# Patient Record
Sex: MALE | Race: WHITE | NOT HISPANIC OR LATINO | Employment: FULL TIME | URBAN - METROPOLITAN AREA
[De-identification: names, ages, dates, MRNs, and addresses within clinical notes are randomized per-mention and may not be internally consistent; named-entity substitution may affect disease eponyms.]

---

## 2018-01-22 ENCOUNTER — CONVERSION ENCOUNTER (OUTPATIENT)
Dept: PODIATRY | Facility: CLINIC | Age: 38
End: 2018-01-22

## 2018-01-22 ENCOUNTER — OFFICE VISIT CONVERTED (OUTPATIENT)
Dept: PODIATRY | Facility: CLINIC | Age: 38
End: 2018-01-22
Attending: PODIATRIST

## 2018-07-19 ENCOUNTER — OFFICE VISIT CONVERTED (OUTPATIENT)
Dept: FAMILY MEDICINE CLINIC | Facility: CLINIC | Age: 38
End: 2018-07-19
Attending: NURSE PRACTITIONER

## 2018-08-20 ENCOUNTER — OFFICE VISIT CONVERTED (OUTPATIENT)
Dept: FAMILY MEDICINE CLINIC | Facility: CLINIC | Age: 38
End: 2018-08-20
Attending: NURSE PRACTITIONER

## 2018-08-24 ENCOUNTER — CONVERSION ENCOUNTER (OUTPATIENT)
Dept: FAMILY MEDICINE CLINIC | Facility: CLINIC | Age: 38
End: 2018-08-24

## 2018-08-24 ENCOUNTER — OFFICE VISIT CONVERTED (OUTPATIENT)
Dept: FAMILY MEDICINE CLINIC | Facility: CLINIC | Age: 38
End: 2018-08-24
Attending: NURSE PRACTITIONER

## 2018-10-03 ENCOUNTER — OFFICE VISIT CONVERTED (OUTPATIENT)
Dept: FAMILY MEDICINE CLINIC | Facility: CLINIC | Age: 38
End: 2018-10-03
Attending: NURSE PRACTITIONER

## 2018-10-03 ENCOUNTER — CONVERSION ENCOUNTER (OUTPATIENT)
Dept: FAMILY MEDICINE CLINIC | Facility: CLINIC | Age: 38
End: 2018-10-03

## 2018-10-12 ENCOUNTER — OFFICE VISIT CONVERTED (OUTPATIENT)
Dept: FAMILY MEDICINE CLINIC | Facility: CLINIC | Age: 38
End: 2018-10-12
Attending: NURSE PRACTITIONER

## 2018-10-29 ENCOUNTER — OFFICE VISIT CONVERTED (OUTPATIENT)
Dept: FAMILY MEDICINE CLINIC | Facility: CLINIC | Age: 38
End: 2018-10-29
Attending: NURSE PRACTITIONER

## 2018-11-19 ENCOUNTER — OFFICE VISIT CONVERTED (OUTPATIENT)
Dept: FAMILY MEDICINE CLINIC | Facility: CLINIC | Age: 38
End: 2018-11-19
Attending: NURSE PRACTITIONER

## 2018-11-29 ENCOUNTER — OFFICE VISIT CONVERTED (OUTPATIENT)
Dept: FAMILY MEDICINE CLINIC | Facility: CLINIC | Age: 38
End: 2018-11-29
Attending: NURSE PRACTITIONER

## 2019-01-08 ENCOUNTER — HOSPITAL ENCOUNTER (OUTPATIENT)
Dept: URGENT CARE | Facility: CLINIC | Age: 39
Discharge: HOME | End: 2019-01-08
Attending: PHYSICIAN ASSISTANT

## 2019-01-08 LAB — GLUCOSE BLD-MCNC: 102 MG/DL (ref 70–99)

## 2019-01-14 ENCOUNTER — OFFICE VISIT CONVERTED (OUTPATIENT)
Dept: FAMILY MEDICINE CLINIC | Facility: CLINIC | Age: 39
End: 2019-01-14
Attending: NURSE PRACTITIONER

## 2019-01-29 ENCOUNTER — HOSPITAL ENCOUNTER (OUTPATIENT)
Dept: OTHER | Facility: HOSPITAL | Age: 39
Discharge: HOME | End: 2019-01-29

## 2019-01-29 LAB
ANION GAP SERPL CALC-SCNC: 15 MMOL/L (ref 8–19)
BUN SERPL-MCNC: 18 MG/DL (ref 5–25)
BUN/CREAT SERPL: 17 {RATIO} (ref 6–20)
CALCIUM SERPL-MCNC: 8.9 MG/DL (ref 8.7–10.4)
CHLORIDE SERPL-SCNC: 104 MMOL/L (ref 99–111)
CONV CO2: 28 MMOL/L (ref 22–32)
CREAT UR-MCNC: 1.03 MG/DL (ref 0.7–1.2)
EST. AVERAGE GLUCOSE BLD GHB EST-MCNC: 174 MG/DL
GFR SERPLBLD BASED ON 1.73 SQ M-ARVRAT: >60 ML/MIN/{1.73_M2}
GLUCOSE SERPL-MCNC: 233 MG/DL (ref 70–99)
HBA1C MFR BLD: 7.7 % (ref 3.5–5.7)
OSMOLALITY SERPL CALC.SUM OF ELEC: 305 MOSM/KG (ref 273–304)
POTASSIUM SERPL-SCNC: 4.2 MMOL/L (ref 3.5–5.3)
SODIUM SERPL-SCNC: 143 MMOL/L (ref 135–147)
T4 FREE SERPL-MCNC: 0.9 NG/DL (ref 0.9–1.8)
TSH SERPL-ACNC: 7.64 M[IU]/L (ref 0.27–4.2)

## 2019-04-30 ENCOUNTER — OFFICE VISIT CONVERTED (OUTPATIENT)
Dept: FAMILY MEDICINE CLINIC | Facility: CLINIC | Age: 39
End: 2019-04-30
Attending: NURSE PRACTITIONER

## 2019-06-24 ENCOUNTER — OFFICE VISIT CONVERTED (OUTPATIENT)
Dept: FAMILY MEDICINE CLINIC | Facility: CLINIC | Age: 39
End: 2019-06-24
Attending: NURSE PRACTITIONER

## 2019-06-24 ENCOUNTER — CONVERSION ENCOUNTER (OUTPATIENT)
Dept: FAMILY MEDICINE CLINIC | Facility: CLINIC | Age: 39
End: 2019-06-24

## 2019-08-07 ENCOUNTER — HOSPITAL ENCOUNTER (OUTPATIENT)
Dept: URGENT CARE | Facility: CLINIC | Age: 39
Discharge: HOME | End: 2019-08-07

## 2019-08-26 ENCOUNTER — HOSPITAL ENCOUNTER (OUTPATIENT)
Dept: OTHER | Facility: HOSPITAL | Age: 39
Discharge: HOME | End: 2019-08-26

## 2019-08-26 LAB
ANION GAP SERPL CALC-SCNC: 19 MMOL/L (ref 8–19)
BUN SERPL-MCNC: 12 MG/DL (ref 5–25)
BUN/CREAT SERPL: 15 {RATIO} (ref 6–20)
CALCIUM SERPL-MCNC: 8.6 MG/DL (ref 8.7–10.4)
CHLORIDE SERPL-SCNC: 104 MMOL/L (ref 99–111)
CONV CO2: 20 MMOL/L (ref 22–32)
CREAT UR-MCNC: 0.78 MG/DL (ref 0.7–1.2)
EST. AVERAGE GLUCOSE BLD GHB EST-MCNC: 160 MG/DL
GFR SERPLBLD BASED ON 1.73 SQ M-ARVRAT: >60 ML/MIN/{1.73_M2}
GLUCOSE SERPL-MCNC: 105 MG/DL (ref 70–99)
HBA1C MFR BLD: 7.2 % (ref 3.5–5.7)
OSMOLALITY SERPL CALC.SUM OF ELEC: 288 MOSM/KG (ref 273–304)
POTASSIUM SERPL-SCNC: 4.1 MMOL/L (ref 3.5–5.3)
SODIUM SERPL-SCNC: 139 MMOL/L (ref 135–147)
T4 FREE SERPL-MCNC: 0.7 NG/DL (ref 0.9–1.8)
TSH SERPL-ACNC: 5.88 M[IU]/L (ref 0.27–4.2)

## 2019-08-27 LAB — CONV ANTI MICROSOMAL AB: 22 IU/ML (ref 0–34)

## 2019-09-18 ENCOUNTER — HOSPITAL ENCOUNTER (OUTPATIENT)
Dept: URGENT CARE | Facility: CLINIC | Age: 39
Discharge: HOME | End: 2019-09-18

## 2019-09-24 ENCOUNTER — HOSPITAL ENCOUNTER (OUTPATIENT)
Dept: OTHER | Facility: HOSPITAL | Age: 39
Discharge: HOME | End: 2019-09-24

## 2019-09-24 LAB
T4 FREE SERPL-MCNC: 1 NG/DL (ref 0.9–1.8)
TSH SERPL-ACNC: 1.92 M[IU]/L (ref 0.27–4.2)

## 2019-11-26 ENCOUNTER — HOSPITAL ENCOUNTER (OUTPATIENT)
Dept: URGENT CARE | Facility: CLINIC | Age: 39
Discharge: HOME | End: 2019-11-26
Attending: PHYSICIAN ASSISTANT

## 2019-11-28 LAB — BACTERIA SPEC AEROBE CULT: NORMAL

## 2019-12-03 ENCOUNTER — HOSPITAL ENCOUNTER (OUTPATIENT)
Dept: OTHER | Facility: HOSPITAL | Age: 39
Discharge: HOME | End: 2019-12-03

## 2019-12-03 LAB
ALBUMIN SERPL-MCNC: 4.1 G/DL (ref 3.5–5)
ALBUMIN/GLOB SERPL: 1.2 {RATIO} (ref 1.4–2.6)
ALP SERPL-CCNC: 110 U/L (ref 53–128)
ALT SERPL-CCNC: 29 U/L (ref 10–40)
ANION GAP SERPL CALC-SCNC: 17 MMOL/L (ref 8–19)
AST SERPL-CCNC: 21 U/L (ref 15–50)
BILIRUB SERPL-MCNC: 0.5 MG/DL (ref 0.2–1.3)
BUN SERPL-MCNC: 11 MG/DL (ref 5–25)
BUN/CREAT SERPL: 11 {RATIO} (ref 6–20)
CALCIUM SERPL-MCNC: 9.6 MG/DL (ref 8.7–10.4)
CHLORIDE SERPL-SCNC: 100 MMOL/L (ref 99–111)
CONV CO2: 25 MMOL/L (ref 22–32)
CONV TOTAL PROTEIN: 7.4 G/DL (ref 6.3–8.2)
CREAT UR-MCNC: 1 MG/DL (ref 0.7–1.2)
EST. AVERAGE GLUCOSE BLD GHB EST-MCNC: 174 MG/DL
GFR SERPLBLD BASED ON 1.73 SQ M-ARVRAT: >60 ML/MIN/{1.73_M2}
GLOBULIN UR ELPH-MCNC: 3.3 G/DL (ref 2–3.5)
GLUCOSE SERPL-MCNC: 132 MG/DL (ref 70–99)
HBA1C MFR BLD: 7.7 % (ref 3.5–5.7)
OSMOLALITY SERPL CALC.SUM OF ELEC: 287 MOSM/KG (ref 273–304)
POTASSIUM SERPL-SCNC: 4.4 MMOL/L (ref 3.5–5.3)
SODIUM SERPL-SCNC: 138 MMOL/L (ref 135–147)
T4 FREE SERPL-MCNC: 0.9 NG/DL (ref 0.9–1.8)
TSH SERPL-ACNC: 3.25 M[IU]/L (ref 0.27–4.2)

## 2020-02-25 ENCOUNTER — OFFICE VISIT CONVERTED (OUTPATIENT)
Dept: FAMILY MEDICINE CLINIC | Facility: CLINIC | Age: 40
End: 2020-02-25
Attending: NURSE PRACTITIONER

## 2020-02-25 ENCOUNTER — CONVERSION ENCOUNTER (OUTPATIENT)
Dept: FAMILY MEDICINE CLINIC | Facility: CLINIC | Age: 40
End: 2020-02-25

## 2020-03-05 ENCOUNTER — HOSPITAL ENCOUNTER (OUTPATIENT)
Dept: DIABETES SERVICES | Facility: HOSPITAL | Age: 40
Discharge: HOME | End: 2020-03-05
Attending: NURSE PRACTITIONER

## 2020-03-05 ENCOUNTER — OFFICE VISIT CONVERTED (OUTPATIENT)
Dept: DIABETES SERVICES | Facility: HOSPITAL | Age: 40
End: 2020-03-05
Attending: NURSE PRACTITIONER

## 2020-05-18 ENCOUNTER — HOSPITAL ENCOUNTER (OUTPATIENT)
Dept: GENERAL RADIOLOGY | Facility: HOSPITAL | Age: 40
Discharge: HOME | End: 2020-05-18
Attending: NURSE PRACTITIONER

## 2020-05-18 ENCOUNTER — OFFICE VISIT CONVERTED (OUTPATIENT)
Dept: FAMILY MEDICINE CLINIC | Facility: CLINIC | Age: 40
End: 2020-05-18
Attending: NURSE PRACTITIONER

## 2020-05-18 ENCOUNTER — CONVERSION ENCOUNTER (OUTPATIENT)
Dept: FAMILY MEDICINE CLINIC | Facility: CLINIC | Age: 40
End: 2020-05-18

## 2020-06-04 ENCOUNTER — HOSPITAL ENCOUNTER (OUTPATIENT)
Dept: DIABETES SERVICES | Facility: HOSPITAL | Age: 40
Discharge: HOME | End: 2020-06-04
Attending: NURSE PRACTITIONER

## 2020-06-04 ENCOUNTER — OFFICE VISIT CONVERTED (OUTPATIENT)
Dept: DIABETES SERVICES | Facility: HOSPITAL | Age: 40
End: 2020-06-04
Attending: NURSE PRACTITIONER

## 2020-07-16 ENCOUNTER — CONVERSION ENCOUNTER (OUTPATIENT)
Dept: FAMILY MEDICINE CLINIC | Facility: CLINIC | Age: 40
End: 2020-07-16

## 2020-07-16 ENCOUNTER — OFFICE VISIT CONVERTED (OUTPATIENT)
Dept: FAMILY MEDICINE CLINIC | Facility: CLINIC | Age: 40
End: 2020-07-16
Attending: NURSE PRACTITIONER

## 2020-08-30 ENCOUNTER — HOSPITAL ENCOUNTER (OUTPATIENT)
Dept: URGENT CARE | Facility: CLINIC | Age: 40
Discharge: HOME | End: 2020-08-30

## 2020-09-01 ENCOUNTER — HOSPITAL ENCOUNTER (OUTPATIENT)
Dept: LAB | Facility: HOSPITAL | Age: 40
Discharge: HOME | End: 2020-09-01
Attending: NURSE PRACTITIONER

## 2020-09-01 LAB
EST. AVERAGE GLUCOSE BLD GHB EST-MCNC: 154 MG/DL
HBA1C MFR BLD: 7 % (ref 3.5–5.7)

## 2020-09-03 ENCOUNTER — OFFICE VISIT CONVERTED (OUTPATIENT)
Dept: DIABETES SERVICES | Facility: HOSPITAL | Age: 40
End: 2020-09-03
Attending: NURSE PRACTITIONER

## 2020-09-03 ENCOUNTER — HOSPITAL ENCOUNTER (OUTPATIENT)
Dept: DIABETES SERVICES | Facility: HOSPITAL | Age: 40
Discharge: HOME | End: 2020-09-03
Attending: NURSE PRACTITIONER

## 2020-09-22 ENCOUNTER — OFFICE VISIT CONVERTED (OUTPATIENT)
Dept: PODIATRY | Facility: CLINIC | Age: 40
End: 2020-09-22
Attending: PODIATRIST

## 2020-12-01 ENCOUNTER — HOSPITAL ENCOUNTER (OUTPATIENT)
Dept: LAB | Facility: HOSPITAL | Age: 40
Discharge: HOME | End: 2020-12-01
Attending: NURSE PRACTITIONER

## 2020-12-01 LAB
EST. AVERAGE GLUCOSE BLD GHB EST-MCNC: 134 MG/DL
HBA1C MFR BLD: 6.3 % (ref 3.5–5.7)

## 2020-12-02 ENCOUNTER — OFFICE VISIT CONVERTED (OUTPATIENT)
Dept: DIABETES SERVICES | Facility: HOSPITAL | Age: 40
End: 2020-12-02
Attending: NURSE PRACTITIONER

## 2021-01-04 ENCOUNTER — HOSPITAL ENCOUNTER (OUTPATIENT)
Dept: URGENT CARE | Facility: CLINIC | Age: 41
Discharge: HOME | End: 2021-01-04
Attending: NURSE PRACTITIONER

## 2021-01-18 ENCOUNTER — OFFICE VISIT CONVERTED (OUTPATIENT)
Dept: FAMILY MEDICINE CLINIC | Facility: CLINIC | Age: 41
End: 2021-01-18
Attending: NURSE PRACTITIONER

## 2021-01-21 ENCOUNTER — OFFICE VISIT CONVERTED (OUTPATIENT)
Dept: PODIATRY | Facility: CLINIC | Age: 41
End: 2021-01-21
Attending: PODIATRIST

## 2021-01-26 ENCOUNTER — OFFICE VISIT CONVERTED (OUTPATIENT)
Dept: PODIATRY | Facility: CLINIC | Age: 41
End: 2021-01-26
Attending: PODIATRIST

## 2021-02-03 ENCOUNTER — HOSPITAL ENCOUNTER (OUTPATIENT)
Dept: OTHER | Facility: HOSPITAL | Age: 41
Setting detail: RECURRING SERIES
Discharge: HOME | End: 2021-03-16
Attending: NURSE PRACTITIONER

## 2021-03-05 ENCOUNTER — HOSPITAL ENCOUNTER (OUTPATIENT)
Dept: DIABETES SERVICES | Facility: HOSPITAL | Age: 41
Discharge: HOME | End: 2021-03-05
Attending: NURSE PRACTITIONER

## 2021-03-05 ENCOUNTER — OFFICE VISIT CONVERTED (OUTPATIENT)
Dept: DIABETES SERVICES | Facility: HOSPITAL | Age: 41
End: 2021-03-05
Attending: NURSE PRACTITIONER

## 2021-03-05 LAB — GLUCOSE BLD-MCNC: 234 MG/DL (ref 70–99)

## 2021-05-10 NOTE — H&P
History and Physical      Patient Name: George Hunter   Patient ID: [id number]   Sex: Male   YOB: 1980    Primary Care Provider: Indu العراقي   Referring Provider: Indu العراقي    Visit Date: January 21, 2021    Provider: Jayy Buitrago DPM   Location: [facility]   Location Address: [address] [place] [address]  [id number]   Location Phone: [phone]          Chief Complaint  · Right Foot Pain  · Ingrown Toenail      History Of Present Illness  George Hunter presents to the office today for evaluation and treatment of      New, Established, New Problem:  New  Location:  Right 1st total toenail border(s)  Duration:  Summer 2020  Onset:  Gradual  Nature:  sore, sharp  Stable, worsening, improving:  worsening    Aggravating factors:  Patient relates pain is aggravated by shoe gear and ambulation.   Previous Treatment:  Self debridement    Patient denies any fevers, chills, nausea, vomiting, shortness of breathe, nor any other constitutional signs nor symptoms.               Past Medical History  Allergies; Anxiety; Broken Bones; Deafness; Diabetes; Foot pain, bilateral; Fracture; Hammertoe; Head injury; Hernia; Plantar fascial fibromatosis of both feet; Toe pain, right         Past Surgical History  Hernia         Medication List  Humalog 100 unit/mL subcutaneous cartridge; Lantus U-100 Insulin 100 unit/mL subcutaneous solution; levothyroxine 25 mcg oral tablet; lisinopril 2.5 mg oral tablet; Omeprazole 40 MG Oral Capsule Delayed Release; simvastatin 10 mg oral tablet; Zithromax Z-Chas 250 mg oral tablet; Zyrtec 10 mg oral tablet         Allergy List  Codiene; PENICILLINS       Allergies Reconciled  Family Medical History  *No Known Family History         Social History  Alcohol (Never); Tobacco (Current some day)         Immunizations  Name Date Admin   Influenza 10/14/2020   Influenza 02/25/2020         Review of Systems  · Constitutional  o Denies  o : fatigue,  "night sweats  · Eyes  o Denies  o : double vision, blurred vision  · HENT  o Denies  o : vertigo, recent head injury  · Cardiovascular  o Denies  o : chest pain, irregular heart beats  · Respiratory  o Denies  o : shortness of breath, productive cough  · Gastrointestinal  o Denies  o : nausea, vomiting  · Genitourinary  o Denies  o : dysuria, urinary retention  · Integument  o * See HPI  · Neurologic  o Denies  o : altered mental status, seizures  · Musculoskeletal  o Denies  o : joint swelling, limitation of motion  · Endocrine  o Denies  o : cold intolerance, heat intolerance  · Heme-Lymph  o Denies  o : petechiae, lymph node enlargement or tenderness  · Allergic-Immunologic  o Denies  o : frequent illnesses      Vitals  Date Time BP Position Site L\R Cuff Size HR RR TEMP (F) WT  HT  BMI kg/m2 BSA m2 O2 Sat FR L/min FiO2 HC       01/21/2021 02:50 /90 Sitting    86 - R  98 232lbs 16oz 6'  1\" 30.74 2.33 95 %            Physical Examination  · Constitutional  o Appearance  o : The patient is awake, alert, well developed, well groomed and well nourished.   · Cardiovascular  o Peripheral Vascular System  o :   § Pedal Pulses  § : 2+ and symmetrical  § Extremities  § : No edema  · Musculoskeletal  o Extremeties/Joint  o : Lower extremity muscle strength and range of motion is equal and symmetrical bilaterally. The knees are noted to be in normal alignment. Ankle alignment and range of motion is normal and foot structure is normal. Subtalar, metatarsal and metatarsal-phalangeal joint range of motion is noted to be within normal limits. The digits of both feet are in normal alignment. The gait is normal.   · Neurologic  o Muskuloskeletal  o :   § RLE  § : Epicritic Sensation intact  § LLE  § : Epicritic Sensation intact  · Toes  o Toes: Right Foot  o :   § Toenails  § : Ingrowing Toenail 1st Total nail plate  · Procedures  o Ingrown Toenail  o : I&D-This procedure is indicated for onychocryptosis. Indications, risks " and benefits and alternative treatments have been discussed with this patient who has agreed to this procedure. The area was sterilely prepped with a povidone-iodine solution. The affected area was locally anesthetized with 3cc, of lidocaine 1%. The offending nail plate was completely excised. A sterile dressing was applied. The patient tolerated the procedure well.           Assessment  · Foot pain, right     729.5/M79.671  · Ingrowing nail     703.0/L60.0  · Onychia and paronychia of toe     681.11/L03.039      Plan  · Orders  o Incision and drainage of abscess, complicated or multiple [facility] ([address]907) - - 01/21/2021  · Medications  o Medications have been Reconciled  o Transition of Care or Provider Policy  · Instructions  o Follow-up in 2 weeks; I & D f/u  o Post operative instructions have been given to the patient for daily care.   o I have discussed the findings of this evaluation with the patient. The discussion included a complete verbal explanation of any changes in the examination results, diagnosis, and the current treatment plan. A schedule for future care needs was explained. If any questions should arise after returning home, I have encouraged the patient to feel free to contact Dr. Buitrago. The patient states understanding and agreement with this plan.   o Pt to monitor for problems and to contact Dr. Buitrago for follow-up should such signs occur. Patient states understanding and agreement with this plan.   o Electronically Identified Patient Education Materials Provided Electronically  · Disposition  o Call or Return if symptoms worsen or persist.            Electronically Signed by: Jayy Buitrago DPM -Author on January 21, 2021 03:10:23 PM

## 2021-05-10 NOTE — H&P
History and Physical      Patient Name: George Hunter   Patient ID: [id number]   Sex: Male   YOB: 1980    Primary Care Provider: Indu العراقي   Referring Provider: Indu العراقي    Visit Date: September 22, 2020    Provider: Jayy Buitrago DPM   Location: [facility]   Location Address: [address] [place] [address]  [id number]   Location Phone: [phone]          Chief Complaint  · Diabetic Foot Evaluation      History Of Present Illness  George Hunter presents to the office today as a Follow-Up for a diabetic foot evaluation.   Patient reports that he is a diabetic currently controlling diabetes with insulin      New, Established, New Problem: est  Location: bilateral feet  Duration:  Onset: gradual  Nature: IDDM  Stable, worsening, improving: stable  Aggravating factors:  Previous Treatment: insulin    Pt states their most recent HgB A1C was 6.8.    Pt states he has a Left plantar fibroma which hurts intermittently.    Patient denies any fevers, chills, nausea, vomiting, shortness of breathe, nor any other constitutional signs nor symptoms.       Past Medical History  Allergies; Anxiety; Broken Bones; Deafness; Diabetes; Foot pain, bilateral; Fracture; Hammertoe; Head injury; Hernia; Plantar fascial fibromatosis of both feet         Past Surgical History  Hernia         Medication List  Euthyrox 25 mcg oral tablet; Humalog 100 unit/mL subcutaneous cartridge; Lantus U-100 Insulin 100 unit/mL subcutaneous solution; levothyroxine 25 mcg oral tablet; lisinopril 2.5 mg oral tablet; omeprazole 40 mg oral capsule,delayed release(DR/EC); simvastatin 10 mg oral tablet; Zyrtec 10 mg oral tablet         Allergy List  Codiene; PENICILLINS       Allergies Reconciled  Family Medical History  *No Known Family History         Social History  Alcohol (Never); Tobacco (Current some day)         Immunizations  Name Date Admin   Influenza          Review of  "Systems  · Constitutional  o Denies  o : fatigue, night sweats  · Eyes  o Denies  o : double vision, blurred vision  · HENT  o Denies  o : vertigo, recent head injury  · Cardiovascular  o Denies  o : chest pain, irregular heart beats  · Respiratory  o Denies  o : shortness of breath, productive cough  · Gastrointestinal  o Denies  o : nausea, vomiting  · Genitourinary  o Denies  o : dysuria, urinary retention  · Integument  o Denies  o : hair growth change, new skin lesions  · Neurologic  o Denies  o : altered mental status, seizures  · Musculoskeletal  o * See HPI  · Endocrine  o Denies  o : cold intolerance, heat intolerance  · Heme-Lymph  o Denies  o : petechiae, lymph node enlargement or tenderness  · Allergic-Immunologic  o Denies  o : frequent illnesses      Vitals  Date Time BP Position Site L\R Cuff Size HR RR TEMP (F) WT  HT  BMI kg/m2 BSA m2 O2 Sat HC       09/22/2020 12:56 /83 Sitting    80 - R  97.7 226lbs 0oz 6'  1\" 29.82 2.3 97 %          Physical Examination  · Constitutional  o Appearance  o : awake, alert, well-developed, and well nourished   · Cardiovascular  o Peripheral Vascular System  o :   § Extremities  § : no edema noted  · Musculoskeletal  o Extremeties/Joint  o : Lower extremity muscle strength and range of motion is equal and symmetrical bilaterally. The knees are noted to be in normal alignment. Ankle alignment and range of motion is normal and foot structure is normal. Subtalar, metatarsal and metatarsal-phalangeal joint range of motion is noted to be within normal limits. The digits of both feet are in normal alignment. The gait is normal. Left plantar medial shows a plantar fibroma. No positive tenderness to palpation.  · Left DM Foot Exam  o Sensation  o : Sharp/dull sensation is within normal limits. [place]-Cheryl 5.07 monofilament intact to all assessed areas.   o Visual Inspection  o : Skin is noted to have normal texture and turgor, with no excrescences noted. The " toenails are noted to be without disease  o Vascular  o : palpable dorsalis pedis and posterir tibialis pulses present, normal capillary refill  · Right DM Foot Exam  o Sensation  o : Sharp/dull sensation is within normal limits. [place]heryl 5.07 monofilament intact to all assessed areas.   o Visual Inspection  o : Skin is noted to have normal texture and turgor, with no excrescences noted. The toenails are noted to be without disease  o Vascular  o : palpable dorsalis pedis and posterir tibialis pulses present, normal capillary refill          Assessment  · Diabetes     250.00/E11.9      Plan  · Orders  o Diabetic Foot (Motor and Sensory) Exam Completed [facility] (, , 2028F) - - 09/22/2020  · Medications  o Medications have been Reconciled  o Transition of Care or Provider Policy  · Instructions  o I have discussed the findings of this evaluation with the patient. The discussion included a complete verbal explanation of any changes in the examination results, diagnosis, and the current treatment plan. A schedule for future care needs was explained. If any questions should arise after returning home, I have encouraged the patient to feel free to contact Dr. Buitrago. The patient states understanding and agreement with this plan.   o Pt to monitor for problems and to contact Dr. Buitrago for follow-up should such signs occur. Patient states understanding and agreement with this plan.   o Patient is to return in one year for their Podiatric Diabetic Evaluation. Diabetic foot exam performed and documented this date, compliant with CQM required standards. Detail of findings as noted in physical exam.Lower extremity Neuro exam for diabetic patient performed and documented this date, compliant with PQRS required standards. Detail of findings as noted in physical exam.Advised patient importance of good routine lower extremity hygiene. Advised patient importance of evaluating for intact skin and pain free nail  borders. Advised patient to use mirror to evaluate plantar/ soles of feet for better visualization. Advised patient monitor and phone office to be seen if any cracking to skin, open lesions, painful nail borders or if nails become elongated prior to next visit. Advised patient importance of daily cleansing of lower extremities, followed by good skin cream to maintain normal hydration of skin. Also advised patient importance of close daily monitoring of blood sugar. Advised to regulate diet and medications to maintain control of blood sugar in optimal range. Contact primary care provider if difficulties maintaining blood sugar levels.Advised Patient of presence of Diabetes Mellitus condition. Advised Patient risk of progression and worsening or improvement, then return of condition. Will monitor condition for any change in future. Treat with most appropriate treatment pending status of condition.Counseled and advised patient extensively on nature and ramifications of diabetes. Standard instructions given to patient for good diabetic foot care and maintenance. Advised importance of careful monitoring to avoid break down and complications secondary to diabetes. Advised patient importance of strict maintenance of blood sugar control. Advised patient of possible ominous results from neglect of condition,i.e.: amputation/ loss of digits, feet and legs, or even death.Patient states understands counseling, will monitor closely, continue good hygiene and routine diabetic foot care. Patient will contact office is questions or problems.   o Electronically Identified Patient Education Materials Provided Electronically  · Disposition  o Call or Return if symptoms worsen or persist.            Electronically Signed by: Jayy Buitrago DPM -Author on September 22, 2020 01:16:20 PM

## 2021-05-13 NOTE — PROGRESS NOTES
Progress Note      Patient Name: George Hunter   Patient ID: [id number]   Sex: Male   YOB: 1980    Primary Care Provider: Jak العراقي   Referring Provider: Jak العراقي    Visit Date: May 18, 2020    Provider: MALCOM Farias   Location: [facility]   Location Address: [address] [place] [address], [address]  [id number]   Location Phone: [phone]          Chief Complaint  · to discuss meds  · fatigue  · left shoulder pain  · heartburn      History Of Present Illness  George Hunter is a 39 year old /White male who presents for evaluation and treatment of:      Patient presents to the office today with complaints of left shoulder pain.  He states that this is the same pain that he has been having for months.  Patient states that it is getting worse with the birth of his son because having to lift him because of the pain to increase.  Patient states that the pain is in the posterior shoulder and needs to the top and then the top part of his this arm.  Patient also states he has a history of hypothyroidism as has not been on his medications.  Patient was seen endocrinology but states that they had not been testing this as well.  Patient complains of being fatigued and states that is similar symptoms to what he was having when he was diagnosed.       Past Medical History  Allergies; Anxiety; Broken Bones; Deafness; Diabetes; Foot pain, bilateral; Fracture; Hammertoe; Head injury; Hernia; Plantar fascial fibromatosis of both feet         Past Surgical History  Hernia         Medication List  Humalog 100 unit/mL subcutaneous cartridge; Lantus U-100 Insulin 100 unit/mL subcutaneous solution; lisinopril 2.5 mg oral tablet; simvastatin 10 mg oral tablet; Zyrtec 10 mg oral tablet         Allergy List  Codiene; PENICILLINS         Family Medical History  *No Known Family History         Social History  Alcohol (Never); Tobacco (Current some day)         Immunizations  Name Date  "Admin   Influenza          Review of Systems  · Constitutional  o Admits  o : fatigue  o Denies  o : fever, weight loss, weight gain  · Cardiovascular  o Denies  o : lower extremity edema, claudication, chest pressure, palpitations  · Respiratory  o Denies  o : shortness of breath, wheezing, cough, hemoptysis, dyspnea on exertion  · Gastrointestinal  o Denies  o : nausea, vomiting, diarrhea, constipation, abdominal pain      Vitals  Date Time BP Position Site L\R Cuff Size HR RR TEMP (F) WT  HT  BMI kg/m2 BSA m2 O2 Sat        05/18/2020 08:13 /82 Sitting    75 - R 16 95.7 225lbs 5oz 6'  1\" 29.73 2.29 94 %          Physical Examination  · Constitutional  o Appearance  o : well-nourished, in no acute distress  · Neck  o Inspection/Palpation  o : normal appearance, no masses or tenderness, trachea midline  o Range of Motion  o : cervical range of motion within normal limits  o Thyroid  o : gland size normal, nontender, no nodules or masses present on palpation  · Respiratory  o Respiratory Effort  o : breathing unlabored  o Inspection of Chest  o : normal appearance  o Auscultation of Lungs  o : normal breath sounds throughout inspiration and expiration  · Cardiovascular  o Heart  o :   § Auscultation of Heart  § : regular rate and rhythm, no murmurs, gallops or rubs  o Peripheral Vascular System  o :   § Extremities  § : no clubbing or edema  · Musculoskeletal  o Spine  o :   § Inspection/Palpation  § : no spinal tenderness, scoliosis or kyphosis present  § Range of Motion  § : spine range of motion normal  o Right Upper Extremity  o :   § Inspection/Palpation  § : no tenderness to palpation, ROM normal  o Left Upper Extremity  o :   § Inspection/Palpation  § : tenderness noted with ROM. tenderness noted to palpation  o Right Lower Extremity  o :   § Inspection/Palpation  § : no joint or limb tenderness to palpation, ROM normal  o Left Lower Extremity  o :   § Inspection/Palpation  § : no joint or limb " tenderness to palpation, ROM normal  · Skin and Subcutaneous Tissue  o General Inspection  o : no rashes or lesions present, no areas of discoloration  o Body Hair  o : hair normal for age, general body hair distribution normal for age  o Digits and Nails  o : no clubbing, cyanosis, deformities or edema present, normal appearing nails  · Neurologic  o Mental Status Examination  o :   § Orientation  § : grossly oriented to person, place and time  o Gait and Station  o : normal gait, able to stand without difficulty  · Psychiatric  o Judgement and Insight  o : judgment and insight intact  o Mood and Affect  o : mood normal, affect appropriate  o Presence of Abnormal Thoughts  o : no hallucinations, no delusions present, no psychotic thoughts  · Left DM Foot Exam  o Sensation  o : normal sensory exam perceptible to 10-gram nylon monofilament exam (5/5), vibration and light touch.  o Visual Inspection  o : visual inspection is normal with no signs of breakdown, ulcerations or deformities unless otherwise noted.   o Vascular  o : palpable dorsalis pedis and posterir tibialis pulses present, normal capillary refill  · Right DM Foot Exam  o Sensation  o : normal sensory exam perceptible to 10-gram nylon monofilament exam (5/5), vibration and light touch.  o Visual Inspection  o : visual inspection is normal with no signs of breakdown, ulcerations or deformities unless otherwise noted.   o Vascular  o : palpable dorsalis pedis and posterir tibialis pulses present, normal capillary refill          Assessment  · Screening for depression     V79.0/Z13.89  · Diabetes mellitus type 1     250.01/E10.9  · Fatigue     780.79/R53.83  · Hyperlipidemia     272.4/E78.5  · Hypothyroidism     244.9/E03.9  · Shoulder pain, left     719.41/M25.512      Plan  · Orders  o Annual depression screening, 15 minutes (43627, ) - V79.0/Z13.89 - 05/18/2020  o ACO-18: Positive screen for clinical depression using a standardized tool and a  follow-up plan documented () - V79.0/Z13.89 - 05/18/2020  o Diabetic Foot (Motor and Sensory) Exam Completed [facility] (, , 2028F) - 250.01/E10.9 - 05/18/2020  o CBC with Auto Diff [facility] (97040) - - 08/18/2020  o CMP [facility] ([address]491) - - 08/18/2020  o Lipid Panel [facility] (17455) - - 08/18/2020  o Thyroid Profile (41159, 73668, THYII) - - 08/18/2020  o ACO-39: Current medications updated and reviewed () - - 05/18/2020  o ACO-14: Influenza immunization administered or previously received () - - 05/18/2020  o Shoulder (Left) [facility] (75825-ST) - - 05/18/2020  · Medications  o levothyroxine 25 mcg oral tablet   SIG: take 1 tablet (25 mcg) by oral route once daily   DISP: (30) tablets with 2 refills  Prescribed on 05/18/2020     o omeprazole 40 mg oral capsule,delayed release(DR/EC)   SIG: take 1 capsule (40 mg) by oral route once daily before a meal   DISP: (30) capsules with 5 refills  Prescribed on 05/18/2020     o Medications have been Reconciled  o Transition of Care or Provider Policy  · Instructions  o Depression Screen completed and scanned into the EMR under the designated folder within the patient's documents.  o Today's PHQ-9 result is ___9  o Advised that cheeses and other sources of dairy fats, animal fats, fast food, and the extras (candy, pastries, pies, doughnuts and cookies) all contain LDL raising nutrients. Advised to increase fruits, vegetables, whole grains, and to monitor portion sizes.   o Patient was educated/instructed on their diagnosis, treatment and medications prior to discharge from the clinic today.  o Time spent with the patient was minutes, more than 50% face to face.  o Electronically Identified Patient Education Materials Provided Electronically  · Disposition  o Call or Return if symptoms worsen or persist.  o follow up in 6 months            Electronically Signed by: MALCOM Farias -Author on May 18, 2020 04:59:47 PM

## 2021-05-13 NOTE — PROGRESS NOTES
Progress Note      Patient Name: George Hunter   Patient ID: [id number]   Sex: Male   YOB: 1980    Primary Care Provider: Jak العراقي   Referring Provider: Jak العراقي    Visit Date: July 16, 2020    Provider: MALCOM Strong   Location: [facility]   Location Address: [address] [place] [address], [address] [place] [address]  [id number]   Location Phone: [phone]          Chief Complaint  · sore throat  · cough  · phlegm buildup  · light green/clear drainage      History Of Present Illness  George Hunter is a 39 year old /White male who presents for evaluation and treatment of: last couple of days has scratchy throat, nasal discharge , greenish color, no fever, no cough       Past Medical History  Disease Name Date Onset Notes   Allergies --  --    Anxiety --  --    Broken Bones --  --    Deafness --  --    Diabetes --  --    Foot pain, bilateral 01/22/2018 --    Fracture --  --    Hammertoe --  --    Head injury --  --    Hernia --  --    Plantar fascial fibromatosis of both feet 01/22/2018 --          Past Surgical History  Procedure Name Date Notes   Hernia --  --          Medication List  Name Date Started Instructions   Humalog 100 unit/mL subcutaneous cartridge  inject by subcutaneous route per prescriber's instructions. Insulin dosing requires individualization.   Lantus U-100 Insulin 100 unit/mL subcutaneous solution 10/03/2018 inject 40 units by subcutaneous route once   levothyroxine 25 mcg oral tablet 05/18/2020 take 1 tablet (25 mcg) by oral route once daily   lisinopril 2.5 mg oral tablet 10/03/2018 take 1 tablet (2.5 mg) by oral route once daily for 30 days   omeprazole 40 mg oral capsule,delayed release(DR/EC) 05/18/2020 take 1 capsule (40 mg) by oral route once daily before a meal   simvastatin 10 mg oral tablet  take 1 tablet (10 mg) by oral route once daily in the evening   Zyrtec 10 mg oral tablet  take 1 tablet (10 mg) by oral route once daily  "        Allergy List  Allergen Name Date Reaction Notes   Codiene --  --  --    PENICILLINS --  --  --          Family Medical History  Disease Name Relative/Age Notes   *No Known Family History  --          Social History  Finding Status Start/Stop Quantity Notes   Alcohol Never --/-- --  --    Tobacco Current some day --/-- 1-3 cigars /mo quit smoking cigarettes in 2011         Immunizations  NameDate Admin Mfg Trade Name Lot Number Route Inj VIS Given VIS Publication   [id number]/25/2020 SKB Fluarix, quadrivalent, preservative free [id number] NE NE 02/25/2020    Comments: [employer]         Review of Systems  · Constitutional  o Denies  o : fatigue, night sweats  · Eyes  o Denies  o : double vision, blurred vision  · HENT  o Admits  o : nasal stuffiness, raw scratchy throat  o Denies  o : vertigo, recent head injury  · Breasts  o Denies  o : abnormal changes in breast size, additional breast symptoms except as noted in the HPI  · Cardiovascular  o Denies  o : chest pain, irregular heart beats  · Respiratory  o Admits  o : cough at times nonprod  o Denies  o : shortness of breath, productive cough  · Gastrointestinal  o Denies  o : nausea, vomiting  · Genitourinary  o Denies  o : dysuria, urinary retention  · Integument  o Denies  o : hair growth change, new skin lesions  · Neurologic  o Denies  o : altered mental status, seizures  · Musculoskeletal  o Denies  o : joint swelling, limitation of motion  · Endocrine  o Denies  o : cold intolerance, heat intolerance  · Heme-Lymph  o Denies  o : petechiae, lymph node enlargement or tenderness  · Allergic-Immunologic  o Denies  o : frequent illnesses      Vitals  Date Time BP Position Site L\R Cuff Size HR RR TEMP (F) WT  HT  BMI kg/m2 BSA m2 O2 Sat        07/16/2020 11:16 /62 Sitting    74 - R  98.8 224lbs 5oz 6'  1\" 29.59 2.29 97 %          Physical Examination  · Constitutional  o Appearance  o : well-nourished, well developed, alert, in no acute distress  · Head " and Face  o Face  o :   § Palpation  § : no facial lesions, no sinus tenderness on palpation  · Eyes  o Conjunctivae  o : conjunctivae normal  o Sclerae  o : sclerae white  o Pupils and Irises  o : pupils equal, round, and reactive to light and accommodation bilaterally  o Corneas  o : tear film normal, no lesions present  o Eyelids/Ocular Adnexae  o : eyelid appearance normal, no exudates present, eye moisture level normal  · Ears, Nose, Mouth and Throat  o Ears  o : external ear auricle normal, otic canal normal, TM with no reddness, effusion, retraction  o Nose  o : external normal, nasal mucosa normal, turbinates normal  o Oral Cavity  o : tongue no lesion, oral mucosa normal  o Throat  o : no erythemia, exudate or lesions  · Neck  o Inspection/Palpation  o : normal appearance, no masses or tenderness, trachea midline, no enlarged cervical or supraclavicular lymphnodes palpated  o Thyroid  o : gland size normal, nontender, no nodules or masses present on palpation, thyroid motion normal during swallowing  · Respiratory  o Respiratory Effort  o : breathing unlabored  o Inspection of Chest  o : normal appearance, no retractions  o Auscultation of Lungs  o : normal breath sounds throughout  · Cardiovascular  o Heart  o :   § Auscultation of Heart  § : regular rate and rhythm without murmur  o Peripheral Vascular System  o :   § Extremities  § : no edema, no cyanosis, no distal hair loss, normal capillary refill  · Skin and Subcutaneous Tissue  o General Inspection  o : no rashes or lesions present, no areas of discoloration  · Neurologic  o Mental Status Examination  o : judgement, insight intact, modd and affect appropriate  o Motor Examination  o : strength grossly intact in all four extremities  o Gait and Station  o : normal gait, able to stand without difficulty          Results  · In-Office Procedures  o Lab procedure  § IOP - Rapid Strep (89212)   § Beta Strep Gp A Culture: Negative   § Internal Control  Verified?: Yes       Assessment  · Cough     786.2/R05  · Sinusitis     473.9/J32.9      Plan  · Orders  o ACO-39: Current medications updated and reviewed () - - 07/16/2020  · Medications  o Zithromax 250 mg oral tablet   SIG: take 2 tablets (500 mg) by oral route once daily for 1 day then 1 tablet (250 mg) by oral route once daily for 4 days   DISP: (6) tablets with 0 refills  Prescribed on 07/16/2020     o Medrol (Chas) 4 mg oral tablets,dose pack   SIG: take by oral route as directed per package instructions for 5 days   DISP: (1) 21 ct dose-pack with 0 refills  Prescribed on 07/16/2020     o Medications have been Reconciled  o Transition of Care or Provider Policy  · Instructions  o Take all medications as prescribed/directed.  o Rest. Increase Fluids.  o Patient was educated/instructed on their diagnosis, treatment and medications prior to discharge from the clinic today.  o Recommend getting throat lozenges, do warm salt water gargles,Can use Robitussin-DM take every 4 hours as needed, does not cause drowsiness usually, drink plenty of fluids, also saline nasal spray use as needed  · Disposition  o Call or Return if symptoms worsen or persist.            Electronically Signed by: Meg Garvin APRN -Author on July 16, 2020 01:27:45 PM

## 2021-05-14 VITALS
DIASTOLIC BLOOD PRESSURE: 82 MMHG | TEMPERATURE: 97.6 F | BODY MASS INDEX: 30.62 KG/M2 | SYSTOLIC BLOOD PRESSURE: 130 MMHG | OXYGEN SATURATION: 95 % | HEIGHT: 73 IN | HEART RATE: 91 BPM | WEIGHT: 231 LBS

## 2021-05-14 VITALS
HEART RATE: 80 BPM | HEIGHT: 73 IN | SYSTOLIC BLOOD PRESSURE: 134 MMHG | WEIGHT: 226 LBS | DIASTOLIC BLOOD PRESSURE: 83 MMHG | BODY MASS INDEX: 29.95 KG/M2 | OXYGEN SATURATION: 97 % | TEMPERATURE: 97.7 F

## 2021-05-14 VITALS
DIASTOLIC BLOOD PRESSURE: 90 MMHG | OXYGEN SATURATION: 95 % | SYSTOLIC BLOOD PRESSURE: 125 MMHG | HEART RATE: 86 BPM | TEMPERATURE: 98 F | WEIGHT: 233 LBS | BODY MASS INDEX: 30.88 KG/M2 | HEIGHT: 73 IN

## 2021-05-14 VITALS
TEMPERATURE: 98.2 F | SYSTOLIC BLOOD PRESSURE: 124 MMHG | OXYGEN SATURATION: 97 % | HEIGHT: 68 IN | BODY MASS INDEX: 34.56 KG/M2 | DIASTOLIC BLOOD PRESSURE: 86 MMHG | HEART RATE: 90 BPM | WEIGHT: 228 LBS

## 2021-05-14 NOTE — PROGRESS NOTES
Progress Note      Patient Name: George Hunter   Patient ID: [id number]   Sex: Male   YOB: 1980    Primary Care Provider: Indu العراقي   Referring Provider: Indu العراقي    Visit Date: January 18, 2021    Provider: MALCOM Farias   [place] [facility]   Location Address: [address] [place] [address], [address] [place] [address]  [id number]   Location Phone: [phone]          Chief Complaint  · Left ear pain  · Left shoulder pain      History Of Present Illness  George Hunter is a 40 year old /White male who presents for evaluation and treatment of:      Patient presents to the office today with complaints of left ear pain.  Patient states that he was seen at urgent care recently and told he had otitis media.  Patient was placed on doxycycline but states that the pain never improved on this antibiotic.  Patient states that the ear is .  He denies any drainage from the ear at this time.  Patient also complains of left shoulder pain.  We did complete x-rays on this and he has been seeing a chiropractor as well.  Patient states that the shoulder continues to have pain with range of motion.  Did discuss doing physical therapy to see if we could reduce the pain    Patient also states that he has a right great toenail that has been causing him pain for a while.  He states that approximately 10 years ago he had his toes smashed in the toenail had turn colors.  He states that blood had to be released from underneath the nail and he states that the nail is never healed since.  He states the other day he kicked the bathtub and this caused extreme pain with some mild bleeding.  I did discuss having a podiatrist evaluate this secondary to him being diabetic.       Past Medical History  Disease Name Date Onset Notes   Allergies --  --    Anxiety --  --    Broken Bones --  --    Deafness --  --    Diabetes --  --    Foot pain, bilateral 01/22/2018 --    Fracture  --  --    Hammertoe --  --    Head injury --  --    Hernia --  --    Plantar fascial fibromatosis of both feet 01/22/2018 --          Past Surgical History  Procedure Name Date Notes   Hernia --  --          Medication List  Name Date Started Instructions   Humalog 100 unit/mL subcutaneous cartridge  inject by subcutaneous route per prescriber's instructions. Insulin dosing requires individualization.   Lantus U-100 Insulin 100 unit/mL subcutaneous solution 10/03/2018 inject 40 units by subcutaneous route once   levothyroxine 25 mcg oral tablet 05/18/2020 take 1 tablet (25 mcg) by oral route once daily   lisinopril 2.5 mg oral tablet 10/03/2018 take 1 tablet (2.5 mg) by oral route once daily for 30 days   Omeprazole 40 MG Oral Capsule Delayed Release 11/30/2020 TAKE 1 CAPSULE BY MOUTH ONCE DAILY BEFORE A MEAL   simvastatin 10 mg oral tablet  take 1 tablet (10 mg) by oral route once daily in the evening   Zyrtec 10 mg oral tablet  take 1 tablet (10 mg) by oral route once daily         Allergy List  Allergen Name Date Reaction Notes   Codiene --  --  --    PENICILLINS --  --  --        Allergies Reconciled  Family Medical History  Disease Name Relative/Age Notes   *No Known Family History  --          Social History  Finding Status Start/Stop Quantity Notes   Alcohol Never --/-- --  --    Tobacco Current some day --/-- 1-3 cigars /mo quit smoking cigarettes in 2011         Immunizations  NameDate Admin Mfg Trade Name Lot Number Route Inj VIS Given VIS Publication   [id number]/14/2020 SKB Fluarix, quadrivalent, preservative free [id number] NE NE 01/18/2021    Comments: [employer]         Review of Systems  · Constitutional  o Denies  o : fatigue, night sweats  · Eyes  o Denies  o : double vision, blurred vision  · HENT  o Denies  o : vertigo, recent head injury  · Breasts  o Denies  o : abnormal changes in breast size, additional breast symptoms except as noted in the HPI  · Cardiovascular  o Denies  o : chest pain, irregular  "heart beats  · Respiratory  o Denies  o : shortness of breath, productive cough  · Gastrointestinal  o Denies  o : nausea, vomiting  · Genitourinary  o Denies  o : dysuria, urinary retention  · Integument  o Denies  o : hair growth change, new skin lesions  · Neurologic  o Denies  o : altered mental status, seizures  · Musculoskeletal  o Admits  o : shoulder pain  o Denies  o : joint swelling, limitation of motion  · Endocrine  o Denies  o : cold intolerance, heat intolerance  · Heme-Lymph  o Denies  o : petechiae, lymph node enlargement or tenderness  · Allergic-Immunologic  o Denies  o : frequent illnesses      Vitals  Date Time BP Position Site L\R Cuff Size HR RR TEMP (F) WT  HT  BMI kg/m2 BSA m2 O2 Sat FR L/min FiO2 HC       01/18/2021 03:41 /86 Sitting    90 - R  98.2 228lbs 0oz 5'  8\" 34.67 2.23 97 %            Physical Examination  · Constitutional  o Appearance  o : well-nourished, in no acute distress  · Eyes  o Conjunctivae  o : conjunctivae normal  o Sclerae  o : sclerae white  o Pupils and Irises  o : pupils equal and round  o Eyelids/Ocular Adnexae  o : eyelid appearance normal, no exudates present  · Ears, Nose, Mouth and Throat  o Ears  o :   § External Ears  § : external auditory canal appearance within normal limits, no discharge present  § Otoscopic Examination  § : tympanic membrane appearance within normal limits bilaterally, Left TM injected   o Nose  o :   § External Nose  § : appearance normal  § Intranasal Exam  § : mucosa within normal limits, vestibules normal, no intranasal lesions present, septum midline, sinuses non tender to palpation  § Nasopharynx  § : no discharge present  o Oral Cavity  o :   § Oral Mucosa  § : oral mucosa normal  § Lips  § : lip appearance normal  § Teeth  § : normal dentation for age  · Neck  o Inspection/Palpation  o : normal appearance, no masses or tenderness, trachea midline  o Range of Motion  o : cervical range of motion within normal " limits  · Respiratory  o Respiratory Effort  o : breathing unlabored  o Inspection of Chest  o : normal appearance  o Auscultation of Lungs  o : normal breath sounds throughout inspiration and expiration  · Cardiovascular  o Heart  o :   § Auscultation of Heart  § : regular rate and rhythm, no murmurs, gallops or rubs  o Peripheral Vascular System  o :   § Pedal Pulses  § : bilateral pulse strength 2+  § Extremities  § : no clubbing or edema  · Musculoskeletal  o Spine  o :   § Inspection/Palpation  § : no spinal tenderness, scoliosis or kyphosis present  § Range of Motion  § : spine range of motion normal  o Right Upper Extremity  o :   § Inspection/Palpation  § : no tenderness to palpation, ROM normal  o Left Upper Extremity  o :   § Inspection/Palpation  § : tenderness to palpation present, no edema present  o Right Lower Extremity  o :   § Inspection/Palpation  § : no joint or limb tenderness to palpation, ROM normal  o Left Lower Extremity  o :   § Inspection/Palpation  § : no joint or limb tenderness to palpation, ROM normal  · Skin and Subcutaneous Tissue  o General Inspection  o : no rashes or lesions present, no areas of discoloration  o Body Hair  o : hair normal for age, general body hair distribution normal for age  o Digits and Nails  o : Left great toe nail discolored and tender to palpation. No active bleeding at this time no infection noted  · Neurologic  o Mental Status Examination  o :   § Orientation  § : grossly oriented to person, place and time  o Gait and Station  o : normal gait, able to stand without difficulty  · Psychiatric  o Judgement and Insight  o : judgment and insight intact  o Mood and Affect  o : mood normal, affect appropriate  o Presence of Abnormal Thoughts  o : no hallucinations, no delusions present, no psychotic thoughts          Assessment  · Diabetes mellitus type 1     250.01/E10.9  · Essential  hypertension     401.9/I10  · Hyperlipidemia     272.4/E78.5  · Hypothyroidism     244.9/E03.9  · Shoulder pain     719.41/M25.519  · Otitis media     382.9/H66.90  · Toe pain     729.5/M79.676      Plan  · Orders  o ACO-14: Influenza immunization administered or previously received [facility] () - - 01/18/2021  o ACO-39: Current medications updated and reviewed (, 1159F) - - 01/18/2021  o PODIATRY CONSULTATION (PODIA) - - 01/18/2021  o PHYSICAL THERAPY CONSULTATION (PHYTH) - - 01/18/2021  · Medications  o Zithromax Z-Chas 250 mg oral tablet   SIG: take 2 tablets (500 mg) by oral route once daily for 1 day then 1 tablet (250 mg) by oral route once daily for 4 days   DISP: (6) Tablet with 0 refills  Prescribed on 01/18/2021     o Medications have been Reconciled  o Transition of Care or Provider Policy  · Instructions  o Patient advised to monitor blood pressure (B/P) at home and journal readings. Patient informed that a B/P reading at home of more than 130/80 is considered hypertension. For readings greater rxow161/90 or higher patient is advised to follow up in the office with readings for management. Patient advised to limit sodium intake.  o Advised that cheeses and other sources of dairy fats, animal fats, fast food, and the extras (candy, pastries, pies, doughnuts and cookies) all contain LDL raising nutrients. Advised to increase fruits, vegetables, whole grains, and to monitor portion sizes.   o Patient was educated/instructed on their diagnosis, treatment and medications prior to discharge from the clinic today.  o Minutes spent with patient including greater than 50% in Education/Counseling/Care Coordination.  o Time spent with the patient was minutes, more than 50% face to face.  o Electronically Identified Patient Education Materials Provided Electronically  · Disposition  o Call or Return if symptoms worsen or persist.            Electronically Signed by: MALCOM Farias -Author on January 18, 2021  04:27:40 PM

## 2021-05-14 NOTE — PROGRESS NOTES
Progress Note      Patient Name: George Hunter   Patient ID: [id number]   Sex: Male   YOB: 1980    Primary Care Provider: Indu العراقي   Referring Provider: Indu العراقي    Visit Date: January 26, 2021    Provider: Jayy Buitrago DPM   Location: [facility]   Location Address: [address] [place] [address]  [id number]   Location Phone: [phone]          Chief Complaint  · Right Foot Pain  · Ingrown Toenail      History Of Present Illness  George Hunter presents to the office today for evaluation and treatment of      New, Established, New Problem: Established  Location:  Right 1st total toenail border(s)  Duration:  Summer 2020  Onset:  Gradual  Nature:  sore, sharp  Stable, worsening, improving: Improving  Aggravating factors:  Patient relates pain is aggravated by shoe gear and ambulation.   Previous Treatment:  Self debridement,  I & D    Patient presents a week earlier for follow-up appointment due to concerns about the drainage and wanted his toe to be evaluated.  Evaluated.    Patient denies any fevers, chills, nausea, vomiting, shortness of breathe, nor any other constitutional signs nor symptoms.               Past Medical History  Allergies; Anxiety; Broken Bones; Deafness; Diabetes; Foot pain, bilateral; Fracture; Hammertoe; Head injury; Hernia; Plantar fascial fibromatosis of both feet; Toe pain, right         Past Surgical History  Hernia         Medication List  Humalog 100 unit/mL subcutaneous cartridge; Lantus U-100 Insulin 100 unit/mL subcutaneous solution; levothyroxine 25 mcg oral tablet; lisinopril 2.5 mg oral tablet; Omeprazole 40 MG Oral Capsule Delayed Release; simvastatin 10 mg oral tablet; Zithromax Z-Chas 250 mg oral tablet; Zyrtec 10 mg oral tablet         Allergy List  Codiene; PENICILLINS       Allergies Reconciled  Family Medical History  *No Known Family History         Social History  Alcohol (Never); Tobacco (Current some day)  "        Immunizations  Name Date Admin   Influenza 10/14/2020   Influenza 02/25/2020         Review of Systems  · Constitutional  o Denies  o : fatigue, night sweats  · Eyes  o Denies  o : double vision, blurred vision  · HENT  o Denies  o : vertigo, recent head injury  · Cardiovascular  o Denies  o : chest pain, irregular heart beats  · Respiratory  o Denies  o : shortness of breath, productive cough  · Gastrointestinal  o Denies  o : nausea, vomiting  · Genitourinary  o Denies  o : dysuria, urinary retention  · Integument  o * See HPI  · Neurologic  o Denies  o : altered mental status, seizures  · Musculoskeletal  o Denies  o : joint swelling, limitation of motion  · Endocrine  o Denies  o : cold intolerance, heat intolerance  · Heme-Lymph  o Denies  o : petechiae, lymph node enlargement or tenderness  · Allergic-Immunologic  o Denies  o : frequent illnesses      Vitals  Date Time BP Position Site L\R Cuff Size HR RR TEMP (F) WT  HT  BMI kg/m2 BSA m2 O2 Sat FR L/min FiO2 HC       01/26/2021 12:53 /82 Sitting    91 - R  97.6 231lbs 0oz 6'  1\" 30.48 2.32 95 %            Physical Examination  · Constitutional  o Appearance  o : The patient is awake, alert, well developed, well groomed and well nourished.   · Cardiovascular  o Peripheral Vascular System  o :   § Pedal Pulses  § : 2+ and symmetrical  § Extremities  § : No edema  · Musculoskeletal  o Extremeties/Joint  o : Lower extremity muscle strength and range of motion is equal and symmetrical bilaterally. The knees are noted to be in normal alignment. Ankle alignment and range of motion is normal and foot structure is normal. Subtalar, metatarsal and metatarsal-phalangeal joint range of motion is noted to be within normal limits. The digits of both feet are in normal alignment. The gait is normal.   · Neurologic  o Muskuloskeletal  o :   § RLE  § : Epicritic Sensation intact  § LLE  § : Epicritic Sensation intact  · Toes  o Toes: Right Foot  o : "   § Toenails  § : Toenail 1st Total nail plate, healing without complications          Assessment  · Foot pain, right     729.5/M79.671  · Ingrowing nail     703.0/L60.0  · Onychia and paronychia of toe     681.11/L03.039      Plan  · Medications  o Medications have been Reconciled  o Transition of Care or Provider Policy  · Instructions  o Follow Up as Needed  o Post operative instructions have been given to the patient for daily care.   o I have discussed the findings of this evaluation with the patient. The discussion included a complete verbal explanation of any changes in the examination results, diagnosis, and the current treatment plan. A schedule for future care needs was explained. If any questions should arise after returning home, I have encouraged the patient to feel free to contact Dr. Buitrago. The patient states understanding and agreement with this plan.   o Pt to monitor for problems and to contact Dr. Buitrago for follow-up should such signs occur. Patient states understanding and agreement with this plan.   o Electronically Identified Patient Education Materials Provided Electronically  · Disposition  o Call or Return if symptoms worsen or persist.            Electronically Signed by: Jayy Buitrago DPM -Author on January 26, 2021 12:58:06 PM

## 2021-05-15 VITALS
HEIGHT: 73 IN | SYSTOLIC BLOOD PRESSURE: 111 MMHG | WEIGHT: 203 LBS | DIASTOLIC BLOOD PRESSURE: 77 MMHG | TEMPERATURE: 97.9 F | HEART RATE: 79 BPM | OXYGEN SATURATION: 98 % | BODY MASS INDEX: 26.9 KG/M2

## 2021-05-15 VITALS
SYSTOLIC BLOOD PRESSURE: 132 MMHG | HEIGHT: 73 IN | OXYGEN SATURATION: 94 % | HEART RATE: 75 BPM | TEMPERATURE: 95.7 F | RESPIRATION RATE: 16 BRPM | BODY MASS INDEX: 29.86 KG/M2 | WEIGHT: 225.31 LBS | DIASTOLIC BLOOD PRESSURE: 82 MMHG

## 2021-05-15 VITALS
WEIGHT: 224.31 LBS | HEART RATE: 74 BPM | DIASTOLIC BLOOD PRESSURE: 62 MMHG | OXYGEN SATURATION: 97 % | HEIGHT: 73 IN | BODY MASS INDEX: 29.73 KG/M2 | TEMPERATURE: 98.8 F | SYSTOLIC BLOOD PRESSURE: 110 MMHG

## 2021-05-15 VITALS
BODY MASS INDEX: 26.97 KG/M2 | DIASTOLIC BLOOD PRESSURE: 86 MMHG | HEART RATE: 74 BPM | WEIGHT: 203.5 LBS | OXYGEN SATURATION: 96 % | SYSTOLIC BLOOD PRESSURE: 107 MMHG | HEIGHT: 73 IN | TEMPERATURE: 98.9 F | RESPIRATION RATE: 12 BRPM

## 2021-05-15 VITALS
WEIGHT: 217 LBS | SYSTOLIC BLOOD PRESSURE: 122 MMHG | RESPIRATION RATE: 18 BRPM | HEIGHT: 73 IN | BODY MASS INDEX: 28.76 KG/M2 | HEART RATE: 76 BPM | DIASTOLIC BLOOD PRESSURE: 74 MMHG | OXYGEN SATURATION: 95 % | TEMPERATURE: 99.4 F

## 2021-05-15 VITALS
TEMPERATURE: 97 F | BODY MASS INDEX: 27.74 KG/M2 | RESPIRATION RATE: 16 BRPM | DIASTOLIC BLOOD PRESSURE: 78 MMHG | HEART RATE: 83 BPM | SYSTOLIC BLOOD PRESSURE: 122 MMHG | OXYGEN SATURATION: 97 % | HEIGHT: 73 IN | WEIGHT: 209.31 LBS

## 2021-05-16 VITALS — BODY MASS INDEX: 25.84 KG/M2 | WEIGHT: 195 LBS | OXYGEN SATURATION: 97 % | HEIGHT: 73 IN | HEART RATE: 102 BPM

## 2021-05-16 VITALS
SYSTOLIC BLOOD PRESSURE: 104 MMHG | WEIGHT: 194 LBS | TEMPERATURE: 98.1 F | RESPIRATION RATE: 16 BRPM | OXYGEN SATURATION: 95 % | HEIGHT: 73 IN | BODY MASS INDEX: 25.71 KG/M2 | DIASTOLIC BLOOD PRESSURE: 87 MMHG | HEART RATE: 86 BPM

## 2021-05-16 VITALS
BODY MASS INDEX: 25.31 KG/M2 | WEIGHT: 191 LBS | SYSTOLIC BLOOD PRESSURE: 127 MMHG | HEART RATE: 94 BPM | DIASTOLIC BLOOD PRESSURE: 92 MMHG | TEMPERATURE: 98.4 F | HEIGHT: 73 IN | OXYGEN SATURATION: 94 % | RESPIRATION RATE: 18 BRPM

## 2021-05-16 VITALS
RESPIRATION RATE: 12 BRPM | SYSTOLIC BLOOD PRESSURE: 110 MMHG | BODY MASS INDEX: 25.36 KG/M2 | DIASTOLIC BLOOD PRESSURE: 65 MMHG | WEIGHT: 191.31 LBS | TEMPERATURE: 98 F | OXYGEN SATURATION: 97 % | HEIGHT: 73 IN | HEART RATE: 65 BPM

## 2021-05-16 VITALS
DIASTOLIC BLOOD PRESSURE: 73 MMHG | SYSTOLIC BLOOD PRESSURE: 114 MMHG | OXYGEN SATURATION: 95 % | BODY MASS INDEX: 26.24 KG/M2 | HEIGHT: 73 IN | WEIGHT: 198 LBS | HEART RATE: 80 BPM | TEMPERATURE: 97.7 F | RESPIRATION RATE: 16 BRPM

## 2021-05-16 VITALS
SYSTOLIC BLOOD PRESSURE: 111 MMHG | HEART RATE: 76 BPM | HEIGHT: 73 IN | WEIGHT: 198 LBS | OXYGEN SATURATION: 98 % | BODY MASS INDEX: 26.24 KG/M2 | RESPIRATION RATE: 16 BRPM | TEMPERATURE: 98.8 F | DIASTOLIC BLOOD PRESSURE: 76 MMHG

## 2021-05-16 VITALS
RESPIRATION RATE: 13 BRPM | WEIGHT: 200.25 LBS | HEART RATE: 78 BPM | HEIGHT: 73 IN | DIASTOLIC BLOOD PRESSURE: 74 MMHG | BODY MASS INDEX: 26.54 KG/M2 | SYSTOLIC BLOOD PRESSURE: 112 MMHG | TEMPERATURE: 97.8 F | OXYGEN SATURATION: 98 %

## 2021-05-16 VITALS
SYSTOLIC BLOOD PRESSURE: 113 MMHG | HEART RATE: 73 BPM | RESPIRATION RATE: 16 BRPM | BODY MASS INDEX: 25.71 KG/M2 | DIASTOLIC BLOOD PRESSURE: 89 MMHG | WEIGHT: 194 LBS | TEMPERATURE: 98.5 F | OXYGEN SATURATION: 98 % | HEIGHT: 73 IN

## 2021-05-16 VITALS
OXYGEN SATURATION: 97 % | TEMPERATURE: 98.1 F | SYSTOLIC BLOOD PRESSURE: 129 MMHG | HEART RATE: 63 BPM | HEIGHT: 73 IN | WEIGHT: 194 LBS | RESPIRATION RATE: 18 BRPM | BODY MASS INDEX: 25.71 KG/M2 | DIASTOLIC BLOOD PRESSURE: 86 MMHG

## 2021-05-20 ENCOUNTER — CONVERSION ENCOUNTER (OUTPATIENT)
Dept: FAMILY MEDICINE CLINIC | Facility: CLINIC | Age: 41
End: 2021-05-20

## 2021-05-20 ENCOUNTER — HOSPITAL ENCOUNTER (OUTPATIENT)
Dept: LAB | Facility: HOSPITAL | Age: 41
Discharge: HOME | End: 2021-05-20
Attending: NURSE PRACTITIONER

## 2021-05-20 ENCOUNTER — OFFICE VISIT CONVERTED (OUTPATIENT)
Dept: FAMILY MEDICINE CLINIC | Facility: CLINIC | Age: 41
End: 2021-05-20
Attending: NURSE PRACTITIONER

## 2021-05-20 LAB
ALBUMIN SERPL-MCNC: 4.3 G/DL (ref 3.5–5)
ALBUMIN/GLOB SERPL: 1.4 {RATIO} (ref 1.4–2.6)
ALP SERPL-CCNC: 101 U/L (ref 53–128)
ALT SERPL-CCNC: 47 U/L (ref 10–40)
AMYLASE SERPL-CCNC: 31 U/L (ref 30–110)
ANION GAP SERPL CALC-SCNC: 15 MMOL/L (ref 8–19)
APPEARANCE UR: CLEAR
AST SERPL-CCNC: 36 U/L (ref 15–50)
BASOPHILS # BLD AUTO: 0.05 10*3/UL (ref 0–0.2)
BASOPHILS NFR BLD AUTO: 0.8 % (ref 0–3)
BILIRUB SERPL-MCNC: 0.68 MG/DL (ref 0.2–1.3)
BILIRUB UR QL: NEGATIVE
BUN SERPL-MCNC: 9 MG/DL (ref 5–25)
BUN/CREAT SERPL: 10 {RATIO} (ref 6–20)
CALCIUM SERPL-MCNC: 9.3 MG/DL (ref 8.7–10.4)
CHLORIDE SERPL-SCNC: 104 MMOL/L (ref 99–111)
COLOR UR: YELLOW
CONV ABS IMM GRAN: 0.01 10*3/UL (ref 0–0.2)
CONV CO2: 25 MMOL/L (ref 22–32)
CONV COLLECTION SOURCE (UA): ABNORMAL
CONV IMMATURE GRAN: 0.2 % (ref 0–1.8)
CONV TOTAL PROTEIN: 7.4 G/DL (ref 6.3–8.2)
CONV UROBILINOGEN IN URINE BY AUTOMATED TEST STRIP: 1 {EHRLICHU}/DL (ref 0.1–1)
CREAT UR-MCNC: 0.92 MG/DL (ref 0.7–1.2)
DEPRECATED RDW RBC AUTO: 38.9 FL (ref 35.1–43.9)
EOSINOPHIL # BLD AUTO: 0.17 10*3/UL (ref 0–0.7)
EOSINOPHIL # BLD AUTO: 2.8 % (ref 0–7)
ERYTHROCYTE [DISTWIDTH] IN BLOOD BY AUTOMATED COUNT: 12.2 % (ref 11.6–14.4)
GFR SERPLBLD BASED ON 1.73 SQ M-ARVRAT: >60 ML/MIN/{1.73_M2}
GLOBULIN UR ELPH-MCNC: 3.1 G/DL (ref 2–3.5)
GLUCOSE SERPL-MCNC: 89 MG/DL (ref 70–99)
GLUCOSE UR QL: NEGATIVE MG/DL
HCT VFR BLD AUTO: 48.8 % (ref 42–52)
HGB BLD-MCNC: 16.5 G/DL (ref 14–18)
HGB UR QL STRIP: NEGATIVE
KETONES UR QL STRIP: ABNORMAL MG/DL
LEUKOCYTE ESTERASE UR QL STRIP: NEGATIVE
LIPASE SERPL-CCNC: 11 U/L (ref 5–51)
LYMPHOCYTES # BLD AUTO: 2.54 10*3/UL (ref 1–5)
LYMPHOCYTES NFR BLD AUTO: 42.2 % (ref 20–45)
MCH RBC QN AUTO: 29.5 PG (ref 27–31)
MCHC RBC AUTO-ENTMCNC: 33.8 G/DL (ref 33–37)
MCV RBC AUTO: 87.3 FL (ref 80–96)
MONOCYTES # BLD AUTO: 0.44 10*3/UL (ref 0.2–1.2)
MONOCYTES NFR BLD AUTO: 7.3 % (ref 3–10)
NEUTROPHILS # BLD AUTO: 2.81 10*3/UL (ref 2–8)
NEUTROPHILS NFR BLD AUTO: 46.7 % (ref 30–85)
NITRITE UR QL STRIP: NEGATIVE
NRBC CBCN: 0 % (ref 0–0.7)
OSMOLALITY SERPL CALC.SUM OF ELEC: 288 MOSM/KG (ref 273–304)
PH UR STRIP.AUTO: 6.5 [PH] (ref 5–8)
PLATELET # BLD AUTO: 297 10*3/UL (ref 130–400)
PMV BLD AUTO: 10.4 FL (ref 9.4–12.4)
POTASSIUM SERPL-SCNC: 3.9 MMOL/L (ref 3.5–5.3)
PROT UR QL: NEGATIVE MG/DL
RBC # BLD AUTO: 5.59 10*6/UL (ref 4.7–6.1)
SODIUM SERPL-SCNC: 140 MMOL/L (ref 135–147)
SP GR UR: 1.02 (ref 1–1.03)
WBC # BLD AUTO: 6.02 10*3/UL (ref 4.8–10.8)

## 2021-05-28 VITALS
DIASTOLIC BLOOD PRESSURE: 62 MMHG | BODY MASS INDEX: 30.77 KG/M2 | OXYGEN SATURATION: 95 % | SYSTOLIC BLOOD PRESSURE: 108 MMHG | RESPIRATION RATE: 18 BRPM | HEIGHT: 73 IN | SYSTOLIC BLOOD PRESSURE: 124 MMHG | TEMPERATURE: 97 F | BODY MASS INDEX: 30.21 KG/M2 | RESPIRATION RATE: 18 BRPM | DIASTOLIC BLOOD PRESSURE: 82 MMHG | HEIGHT: 73 IN | WEIGHT: 232.14 LBS | WEIGHT: 227.96 LBS | HEART RATE: 81 BPM

## 2021-05-28 VITALS
HEIGHT: 73 IN | SYSTOLIC BLOOD PRESSURE: 120 MMHG | HEIGHT: 73 IN | WEIGHT: 215 LBS | DIASTOLIC BLOOD PRESSURE: 86 MMHG | WEIGHT: 228.4 LBS | BODY MASS INDEX: 30.27 KG/M2 | RESPIRATION RATE: 18 BRPM | TEMPERATURE: 98 F | BODY MASS INDEX: 28.49 KG/M2

## 2021-05-28 NOTE — PROGRESS NOTES
Patient: SUZY CHRISTOPHER     Acct: [id number]     Report: #[id number]  UNIT #: [id number]     : 1980    Encounter Date:2020  PRIMARY CARE: CLOVER SILVA  ***Jayme***  --------------------------------------------------------------------------------------------------------------------  Date of Encounter      Sep 3, 2020            Chief Complaint      DIABETES MELLITUS TYPE I            Referring Providers/Copies To      Referring Provider:  CLOVER SILVA      Copies To:   CLOVER SILVA            Allergies      Coded Allergies:             CODEINE (Verified  Allergy, Unknown, itching, 19)           PENICILLINS (Verified  Allergy, Unknown, yeast infection, 19)            Medications      Last Reconciled on 20 3:59 pm by SUZANNE BECKER      Levothyroxine (Levothyroxine) 0.025 Mg Tablet      0.025 MG PO QDAY@07, #30 TAB 0 Refills         Reported         20       Omeprazole (Omeprazole*) 40 Mg Capsule      40 MG PO QDAY, #60 CAP 0 Refills         Reported         20       Simvastatin (Simvastatin*) 10 Mg Tablet      10 MG PO HS, #30 TAB 0 Refills         Reported         20       Lisinopril* (Lisinopril*) 2.5 Mg Tablet      2.5 PO QDAY, #30 TAB 0 Refills         Reported         17       Cetirizine Hcl (zyrTEC) 10 Mg Tablet      10 MG PO QDAY PRN for ALLERGIES, #30 TAB 0 Refills         Reported         10/12/17       Insulin Lispro (HumaLOG KWIKPEN 100 UNITS/ML) 100 Units/Ml Insuln.pen      30 UNITS SUBQ TID MEALS, #1 BOX 0 Refills         Reported         10/12/17       Insulin Glargine (Lantus SOLOSTAR) 100 Unit/1 Ml Insuln.pen      40 UNITS SUBQ HS, #1 BOX 0 Refills         Reported         10/12/17            Vital Signs      Height 6 ft 1 in / 185.42 cm      Weight 227 lbs 15.290 oz / 103.4 kg      BSA 2.27 m2      BMI 30.1 kg/m2      Respirations 18      Blood Pressure 108/62 Sitting, Left Arm            Eye Exam      When was your last eye exam?:        2019       Where was it done?:        [facility]            Pain Score      Experiencing any pain?:  Yes      Pain Scale Used:  Numerical      Pain Intensity:  8      Pain Comment:        Left Foot and Left arm            Preventative      Hx Influenza Vaccination:  Yes      Date Influenza Vaccine Given:  Oct 31, 2019      Influenza Vaccine Declined:  No      Have You Had 2 or More Falls i:  No      Have You Had a Fall with an In:  No      Hx Pneumococcal Vaccination:  Yes      Encouraged to follow-up with:  PCP regarding preventative exams.      Chart initiated by:      Anat Alex MA            HPI - Diabetes      This patient is seen in the office today for follow-up evaluation for diabetes     medication management.  He is a 40-year-old male patient with a history of type     1 diabetes controlled.  Patient is currently managed on Lantus 40 units every     day and Humalog with a 1-10 carbohydrate ratio in 1-20 correction for glucose     levels greater than 120.  The patient states his glucose levels have been very     well controlled most recently.  He denies any problems with hypoglycemia.            The patient voices concerns about swelling in his feet.  He states from     approximately August 26 through September 1 he was having swelling in his feet     specifically the left foot more so than the right.  He states it was so     significant he went to a [facility] emergency treatment center for evaluation but     they were unable to determine what the cause was.  He denies having any acute     but states he had been on his feet quite a bit the day that the swelling was     noted.  His feet were examined and there is mild pedal edema and ankle edema     noted today.  He states the swelling is very much improved today.  The lower     extremities are mildly discolored with a pink to reddish tone bilaterally.  The     extremities are slightly cool but good pulses are palpated bilaterally.  He does    complain of some pain  in the left (see foot exam notes).            The patient also reports he is still having the left shoulder pain that he     complained of at his last appointment.  He states that he has not done any     additional imaging to determine the possible cause.            Most Recent Lab Findings      Most Recent HGA1C      His most recent A1c was collected on September 1, 2020 was 7% indicating     controlled type 1 diabetes            Item Value  Date Time             Hemoglobin A1c 7.0 % H 9/1/20 1427            Diabetes Type:  Type 1      Diabetes Complications:  None      Number of Years?:  20      Hospitalizations 2nd to DM?:  Yes (Due to DKA several years ago)      ER/911 Secondary to DM:  No      Dietary Habits:  3 Meals daily, Snacks, Carb Count, Diet Drinks      Exercise:  None      BG Monitoring:  Meter      Frequency:  Times per day (3-4)      Blood Glucose Range:        He reports 130s to 140s on most occasions            PAST,FAMILY,   Past Medical History      Past Medical History:  Thyroid Disease            Past Surgical History      Other Past Surgical History      Hernia Surgery when patient was 1 or 2            Past Family History      TYPE 1 DM (Aunt), CV Disease (Grandmother), None            Social History      Marrital Status:        Lives independently:  No      Number of Children:  1      Occupation:  He is in middle school             Tobacco Use      Smoking status:  Former smoker            Vaping      Currently Vaping:  No            Alcohol Use      Rare            Substance Use      Substance Use:  Denies Use            Review of Sytems      General:  No Appetite Changes, No Fatigue, No Weight Loss; Weight Gain      Eyes:  Negative for Blurred Vision; Positive for Corrective Lenses; Negative for    Vision Changes      Cardiovascular:  No Chest Pain, No Palpitations      Gastrointestinal:  No Constipation, No Diarrhea, No Nausea/Vomiting       Integumentary:  No Lesions, No Rash, No Wounds      Neurologic:  Extremity Pain (He complains of left arm pain radiating from the     shoulder down); No Numbness, No Tingling      Psychiatric:  No Anxiety, No Depression      Endocrine:  Hypoglycemia (He reports rare episode); No Hypo Unawareness;     Nocturnal Hypo (Positive history of); No Polyuria, No Polyphagia, No Polydipsia            Exam      General:  Awake and Alert, Appropriately dressed/groomed, No Acute Distress,       Eyes:  Sclera Non-Icteric, EOM Intact, Eyelids without swelling,       Cardiovascular:  No No Peripheral Edema; Normal Chest Appearance; No Hear Tones     Normal S1 S2;  (There is mild bilateral pedal edema slightly increased in the     left lower extremity)      Musculoskeletal:  Steady Gait, Normal Strength, Normal ROM,       Respiratory:  No Respiratory Distress, No Cyanosis, No use of Accessory Musc,       Skin:  No Blisters, No Cuts\Scrapes, No Acanthosis Nigricans, No DM Dermopathy,     No Fungus, No NLD, No Rash, No Vitiligo      Psychiatric:  Appropriate Affect, Intact Judgement, Oriented x 3,       Right Foot:  No Abnormal Toe Nails, No Altered Temp, No Amputations, No Bunions,    No Callous, No Deformity, No Diminished Pulses, No Heel Cracks, No Limited ROM,     No Neuropathic Fx/Charcot, No Pedal Pulses Present; Redness (Generalized pink to    red tone from the mid calf to the foot), Swelling (Mild swelling); No Ulcers, No    Other      Monofilament Exam Right Foot:  4 pt Sensation Intact      Left Foot:  No Abnormal Toe Nails, No Altered Temp, No Amputation, No Bunions,     No Callous; Deformity (There is a hard dime sized nodule on the plantar surface     of the mid foot; the patient states that it has gradually gotten larger; he he     does complain of tenderness upon palpation); No Diminished Pulses, No Heel     Cracks, No Limited ROM, No Neuropathic Fx/Charcot, No Pedal Pulses Present;     Redness (Generalized pink to red  tone from the mid calf to the foot), Swelling     (Mild pedal edema); No Ulcers, No Other      Monofilament Exam Left Foot:  4 pt Sensation Intact            Impression      Type 1 diabetes controlled, hypothyroidism            Diagnosis      TYPE 1 DIABETES MELLITUS WITH HYPERGLYCEMIA - E10.65            Notes      New Diagnostics      * Hemoglobin A1c, Routine         Dx: TYPE 1 DIABETES MELLITUS WITH HYPERGLYCEMIA - E10.65            Plan      No changes were made to the patient's current treatment plan for his diabetes     medications.  He will continue to monitor his glucose levels 3-4 times each day.     The patient is urged to follow-up with his primary care provider regarding     ongoing left arm pain.  The patient is established with Dr. Buitrago for     podiatry services but has not seen him for quite some time.  We contacted his     office and scheduled him for an appointment regarding the nodule on the left     foot.  That appointment was scheduled for September 22, 2020 at 1 PM.  The     patient be scheduled for follow-up in our office in 3 months for reevaluation.      We will collect a hemoglobin A1c prior to that appointment.            Pain Plan      Pain Follow-up Plan (The patient is scheduled with podiatry for evaluation of     the nodule of the left foot and pain; he will follow-up with his PCP for the     left shoulder pain)            Referrals      Podiatry            Electronically signed by SUZANNE BECKER  09/03/2020 13:05       Disclaimer: Converted document may not contain table formatting or lab diagrams. Please see [employer] for the authenticated document.

## 2021-05-28 NOTE — PROGRESS NOTES
Patient: SUZY CHRISTOPHER     Acct: [id number]     Report: #[id number]  UNIT #: [id number]     : 1980    Encounter Date:2020  PRIMARY CARE: CLOVER SILVA  ***Jayme***  --------------------------------------------------------------------------------------------------------------------  Date of Encounter      2020            Chief Complaint      DIABETES MELLITUS TYPE I            Referring Providers/Copies To      Referring Provider:  CLOVER SILVA      Copies To:   CLOVER SILVA            Allergies      Coded Allergies:             CODEINE (Verified  Allergy, Unknown, itching, 19)           PENICILLINS (Verified  Allergy, Unknown, yeast infection, 19)            Medications      Last Reconciled on 20 3:59 pm by SUZANNE BECKER      Levothyroxine (Levothyroxine) 0.025 Mg Tablet      0.025 MG PO QDAY@07, #30 TAB 0 Refills         Reported         20       Omeprazole (Omeprazole*) 40 Mg Capsule      40 MG PO QDAY, #60 CAP 0 Refills         Reported         20       Simvastatin (Simvastatin*) 10 Mg Tablet      10 MG PO HS, #30 TAB 0 Refills         Reported         20       Lisinopril* (Lisinopril*) 2.5 Mg Tablet      2.5 PO QDAY, #30 TAB 0 Refills         Reported         17       Cetirizine Hcl (zyrTEC) 10 Mg Tablet      10 MG PO QDAY PRN for ALLERGIES, #30 TAB 0 Refills         Reported         10/12/17       Insulin Lispro (HumaLOG KWIKPEN 100 UNITS/ML) 100 Units/Ml Insuln.pen      30 UNITS SUBQ TID MEALS, #1 BOX 0 Refills         Reported         10/12/17       Insulin Glargine (Lantus SOLOSTAR) 100 Unit/1 Ml Insuln.pen      40 UNITS SUBQ HS, #1 BOX 0 Refills         Reported         10/12/17            Vital Signs      Height 6 ft 1.00 in / 185.42 cm      Weight 228 lbs 6.345 oz / 103.6 kg      BSA 2.28 m2      BMI 30.1 kg/m2      Temperature 98.0 F / 36.67 C - Temporal      Respirations 18      Blood Pressure 120/86 Sitting, Right Arm             Preventative      Hx Influenza Vaccination:  Yes      Date Influenza Vaccine Given:  Oct 31, 2019      Influenza Vaccine Declined:  No      Have You Had 2 or More Falls i:  No      Have You Had a Fall with an In:  No      Hx Pneumococcal Vaccination:  Yes      Encouraged to follow-up with:  PCP regarding preventative exams.      Chart initiated by:      Anat Alex MA            HPI - Diabetes      This patient is seen in the office today for follow-up evaluation for diabetes     medication management.  He is a 39-year-old male patient with a history of type     1 diabetes.  He is currently managed on Lantus 40 units every day and NovoLog 20    to 30 units with each meal based on glucose levels and carbohydrate intake.  The    patient reports that he has been trying to do better with his diet and has seen     some improvement because of that.  He does report that he occasionally will miss    a dose of insulin or take it after the meal instead of before the meal and he     will see some higher glucose levels.  He continues to test his blood glucose 3-4    times each day.            The patient complains of excessive fatigue recently.  He states about mid day he    will get very tired and feel like he has to take a nap.  The patient had been     treated previously with levothyroxine for hypothyroidism but the medication was     stopped by another provider.  He has since seen his primary care provider who     restarted the levothyroxine and the patient states he is feeling a bit better     already.  Additionally, the patient complains of some pain in the left upper arm    radiating from the shoulder down towards the elbow posteriorly.  He states it     feels like a pinched nerve or something like that.  He did have an x-ray which     had no findings.  The patient was encouraged to follow-up with his PCP to see if    advanced imaging is required.            Most Recent Lab Findings      Most Recent HGA1C      The  patient states that he had labs collected in May however they are not     available in our system and attempts were made to retrieve these from the     facility and they are unable to find those results for us at this time.      Diabetes Type:  Type 1      Diabetes Complications:  None      Number of Years?:  20      Hospitalizations 2nd to DM?:  Yes (DKA several years ago)      ER/911 Secondary to DM:  Yes (Due to hypoglycemic episodes in the past)      Dietary Habits:  3 Meals daily, Snacks, Carb Count (He counts his carbs to     adjust for insulin dose), Diet Drinks      Exercise:  None (He states he used to exercise regularly but he has not been     doing so since his child was born 5 months ago)      BG Monitoring:  Meter      Frequency:  Times per day (3-4 times each day)      Blood Glucose Range:        105-150            PAST,FAMILY,   Past Medical History      Past Medical History:  Thyroid Disease (hypothyroid)            Past Surgical History      Other Past Surgical History      inguinal hernia surgery            Past Family History      None            Social History      Marrital Status:        Lives independently:  Yes      Number of Children:  1      Occupation:  He is in middle school             Tobacco Use      Smoking status:  Former smoker (quit 2011)            Vaping      Currently Vaping:  No            Alcohol Use      Rare            Substance Use      Substance Use:  Denies Use            Review of Sytems      General:  Fatigue (especially fatigued recently), Weight Gain (10 pounds)      Eyes:  Positive for Corrective Lenses      Cardiovascular:  No Chest Pain, No Palpitations      Gastrointestinal:  No Constipation, No Diarrhea, No Nausea/Vomiting      Integumentary:  No Lesions, No Rash, No Wounds      Neurologic:  No Extremity Pain, No Numbness, No Tingling      Psychiatric:  Anxiety (gets anxiety a lot; new father; work, etc)      Endocrine:   Hypoglycemia (rare), Nocturnal Hypo (rare)            Exam      General:  Awake and Alert, Appropriately dressed/groomed, No Acute Distress,       Eyes:  Sclera Non-Icteric, EOM Intact, Eyelids without swelling,       Cardiovascular:  No Peripheral Edema, Normal Chest Appearance,       Musculoskeletal:  Steady Gait, Normal Strength, Normal ROM,       Respiratory:  No Respiratory Distress, No Cyanosis, No use of Accessory Musc,       Skin:  No Blisters, No Cuts\Scrapes, No Acanthosis Nigricans, No DM Dermopathy,     No Fungus, No NLD, No Rash, No Vitiligo      Psychiatric:  Appropriate Affect, Intact Judgement, Oriented x 3,             Impression      Type 1 diabetes uncontrolled, hypothyroidism            Diagnosis      TYPE 1 DIABETES MELLITUS WITH HYPERGLYCEMIA - E10.65            Notes      New Diagnostics      * Hemoglobin A1c, Routine         Dx: TYPE 1 DIABETES MELLITUS WITH HYPERGLYCEMIA - E10.65            Plan      At this time no changes will be made to the patient's current medication     management plan.  The patient will be scheduled for follow-up appointment in 3     months.  We will collect an A1c prior to that visit.  The patient will contact     her office if he has any problematic glucose levels.  He will continue to     monitor his glucose levels 3-4 times a day.            Electronically signed by SUAZNNE BECKER  06/04/2020 09:51       Disclaimer: Converted document may not contain table formatting or lab diagrams. Please see [employer] for the authenticated document.

## 2021-05-28 NOTE — PROGRESS NOTES
Patient: SUZY CHRISTOPHER     Acct: [id number]     Report: #[id number]  UNIT #: [id number]     : 1980    Encounter Date:2021  PRIMARY CARE: CLOVER SILVA  ***Jayme***  --------------------------------------------------------------------------------------------------------------------  Date of Encounter      Mar 5, 2021            Chief Complaint      DIABETES MELLITUS TYPE I            Referring Providers/Copies To      Referring Provider:  CLOVER SILVA      Copies To:   CLOVER SILVA            Allergies      Coded Allergies:             CODEINE (Verified  Allergy, Unknown, itching, 19)           PENICILLINS (Verified  Allergy, Unknown, yeast infection, 19)            Medications      Last Reconciled on 3/7/21 9:28 pm by SUZANNE BECKER      Josh-Fluticasone (Fluticasone 50 mcg) 16 Gm Spray.susp      2 PUFFS NARE EACH QDAY, #1 BOTTLE 0 Refills         Prov: ALLI RODRIGUEZ cfr         21       Doxycycline Hyclate (Doxycycline Hyclate*) 100 Mg Capsule      100 MG PO BID for 10 Days, #20 CAP 0 Refills         Prov: ALLI RODRIGUEZ cfr         21       Insulin Lispro (HumaLOG KWIKPEN 100 UNITS/ML) 100 Units/Ml Insuln.pen      30 UNITS SUBQ TID MEALS for 30 Days, #2 BOX 5 Refills         Prov: SUZANNE BECKER         10/16/20       Levothyroxine (Levothyroxine) 0.025 Mg Tablet      0.025 MG PO QDAY@07, #30 TAB 0 Refills         Reported         20       Omeprazole (Omeprazole*) 40 Mg Capsule      40 MG PO QDAY, #60 CAP 0 Refills         Reported         20       Simvastatin (Simvastatin*) 10 Mg Tablet      10 MG PO HS, #30 TAB 0 Refills         Reported         4/13/20       Lisinopril* (Lisinopril*) 2.5 Mg Tablet      2.5 PO QDAY, #30 TAB 0 Refills         Reported         17       Cetirizine Hcl (zyrTEC) 10 Mg Tablet      10 MG PO QDAY PRN for ALLERGIES, #30 TAB 0 Refills         Reported         10/12/17       Insulin Glargine (Lantus SOLOSTAR) 100 Unit/1 Ml  Insuln.pen      40 UNITS SUBQ HS, #1 BOX 0 Refills         Reported         10/12/17            Vital Signs      Height 6 ft 1.00 in / 185.42 cm      Weight 232 lbs 2.310 oz / 105.3 kg      BSA 2.29 m2      BMI 30.6 kg/m2      Temperature 97.0 F / 36.11 C - Temporal      Pulse 81      Respirations 18      Blood Pressure 124/82 Sitting, Right Arm      Pulse Oximetry 95%, room air      Blood Glucose Value:  234 (finger stick)            Eye Exam      When was your last eye exam?:        2019      Where was it done?:        [facility]            Pain Score      Experiencing any pain?:  No            Preventative      Hx Influenza Vaccination:  Yes      Date Influenza Vaccine Given:  Oct 1, 2020      Influenza Vaccine Declined:  No      Have You Had 2 or More Falls i:  No      Have You Had a Fall with an In:  No      Hx Pneumococcal Vaccination:  Yes      Encouraged to follow-up with:  PCP regarding preventative exams.      Chart initiated by:      Anat Alex MA            HPI - Diabetes      This patient is seen in the office today for follow-up diabetes medication     management.  He is a 40-year-old male patient with a history of type 1 diabetes     without complications.  Patient is currently managed using Lantus 40 units every    day and Humalog using a 1-10 carbohydrate ratio to 1-20 correction for glucose     levels greater than 120 mg/dL.  Patient states that his glucose levels are     staying fairly well controlled.  He does complain of feeling very tired all the     time.  He describes it as feeling exhausted.  He states he has had some stress     eating.  The patient has a very unusual sleeping pattern because of having to     watch his child while his wife is working.  His fatigue may be related to his     hypothyroidism.  In review of labs he is not had a TSH level drawn in over a     year.            He states he recently had the toenail removed from the right great toe in     January by the  podiatrist.  He is also undergoing physical therapy for right     shoulder shoulder pain.            He states his glucose is elevated today because he forgot to take his Lantus     last night.  He states he rarely misses his doses of insulin.            Most Recent Lab Findings      Most Recent HGA1C      A point-of-care A1c was collected in the office today and was 6.7% indicating     controlled type 1 diabetes.      Diabetes Type:  Type 1      Diabetes Complications:  None      Number of Years?:  20      Hospitalizations 2nd to DM?:  Yes (Due to DKA several years ago)      ER/911 Secondary to DM:  No      Dietary Habits:  3 Meals daily, Snacks, Carb Count, Diet Drinks      Exercise:  None      BG Monitoring:  Meter      Frequency:  Times per day (3-4)      Blood Glucose Range:        He reports no higher than 150            PAST,FAMILY,   Past Medical History      Past Medical History:  Thyroid Disease            Past Surgical History      Other Past Surgical History      Hernia Surgery when patient was 1 or 2            Past Family History      TYPE 1 DM, CV Disease, None            Social History      Marrital Status:        Lives independently:  No      Number of Children:  1      Occupation:  He is in middle school             Tobacco Use      Smoking status:  Former smoker            Vaping      Currently Vaping:  No            Alcohol Use      Rare            Substance Use      Substance Use:  Denies Use            Review of Sytems      General:  No Appetite Changes; Fatigue; No Weight Loss, No Weight Gain, No     Weakness      Eyes:  No Blurred Vision; Corrective Lenses; No Vision Changes, No Vision Loss      Cardiovascular:  No Chest Pain, No Palpitations, No Peripheral edema      Gastrointestinal:  No Constipation, No Diarrhea, No Nausea/Vomiting      Integumentary:  No Lesions, No Rash, No Wounds      Neurologic:  No Extremity Pain, No Numbness, No Tingling, No  Headache, No     Dizziness      Psychiatric:  Anxiety, Depression, Stress      Endocrine:  No Hypoglycemia, No Hypo Unawareness, No Nocturnal Hypo, No     Polyuria, No Polyphagia, No Polydipsia      ENT:  No Hearing loss, No Sinusitis, No Difficulty swallowing      Respiratory:  No Shortness of breath, No Wheezing, No Cough      Genitourinary:  No UTI, No Vaginal yeast infections, No Difficulty urinating, No    Incontinence      Musculoskeletal:  Joint pain, Muscle pain, Back pain            Exam      Constitutional:  Awake and Alert, Appropriately dressed/groomed; Not Acutely     Distressed      Eyes:  No Scleral Icterus; Intact EOM; No Eyelid swelling      Cardiovascular:  No Peripheral Edema; Normal Chest Appearance      Musculoskeletal:  Steady Gait, Normal Strength, Normal ROM,       Respiratory:  No Respiratory Distress, No Cyanosis, No Accessory Muscle use      Skin:  No Blisters, No Cuts\Scrapes, No Acanthosis Nigricans, No DM Dermopathy,     No Fungus, No NLD, No Rash, No Vitiligo      Psychiatric:  Appropriate Affect, Intact Judgement, Oriented x 3,       ENMT:  Nose/ears normal, Normal hearing      Extremities:  No Cyanosis, No Edema; Normal temperature; No Deformity            Impression      Type 1 diabetes controlled, hypothyroidism            Diagnosis      Type 1 diabetes mellitus without complications - E10.9            Notes      New Office Procedures      * POINT OF CARE BG, Routine         Dx: Type 1 diabetes mellitus without complications - E10.9      * POINT OF CARE HGA1C, Routine         Dx: Type 1 diabetes mellitus without complications - E10.9            Plan      No changes were made to the patient's treatment plan.  He is encouraged to     continue monitoring his glucose levels 3-4 times a day.  He will be scheduled     for follow-up evaluation in 3 months.            Pain Plan      Pain Zero Today            Electronically signed by SUZANNE BECKER  03/07/2021 21:28       Disclaimer:  Converted document may not contain table formatting or lab diagrams. Please see [employer] [facility] for the authenticated document.

## 2021-05-28 NOTE — PROGRESS NOTES
Patient: GEORGE HUNTER     Acct: [id number]     Report: #[id number]  UNIT #: [id number]     : 1980    Encounter Date:2020  PRIMARY CARE: CLOVER SILVA  ***Signed***  --------------------------------------------------------------------------------------------------------------------  ADDENDUM: 20 0834          Addendum: SUZANNE BECKER on 20 @ 8:34 am            The following staff were present during the visit: MALCOM Liang      History of Present Illness            Chief Complaint: Type 1 diabetes            George Hunter is presenting for evaluation via Video and Audio conferencing.     Verbal consent obtained via Video and Audio before beginning the visit.            The following staff were present during the visit: (Name and Title for all who     video/audio with patient)                         Past Med History      Type 1 diabetes, hypothyroidism      Overview of Symptoms      This patient is evaluated via televideo visit for follow-up diabetes medication     management.  He is a 40-year-old male patient with a history of type 1 diabetes.     He has no existing complications of his diabetes.  Patient is currently managed    using Lantus 40 units every day and Humalog with a 1-10 carbohydrate ratio and a    1-20 correction for glucose levels greater than 120.  He states that his average    dose of Humalog is around 18 units.  He states most glucose levels are staying     in the 120s.  He denies having any episodes of hypoglycemia.  The patient does     report he was seen by podiatry in regards to the nodules on his feet but they     elected to avoid surgery unless they become painful for the patient.  At the     patient's last appointment he was having some problems with swelling in his feet    and he says that has resolved.  He is also seeing a chiropractor for left     shoulder pain.            Allergies and Medications      Allergies:        Coded Allergies:              CODEINE (Verified  Allergy, Unknown, itching, 11/26/19)           PENICILLINS (Verified  Allergy, Unknown, yeast infection, 11/26/19)      Medications    Last Reconciled on 12/2/20 1:51 pm by SUZANNE BECKER      Insulin Lispro (HumaLOG KWIKPEN 100 UNITS/ML) 100 Units/Ml Insuln.pen      30 UNITS SUBQ TID MEALS for 30 Days, #2 BOX 5 Refills         Prov: SUZANNE BECKER         10/16/20       Levothyroxine (Levothyroxine) 0.025 Mg Tablet      0.025 MG PO QDAY@07, #30 TAB 0 Refills         Reported         6/4/20       Omeprazole (Omeprazole*) 40 Mg Capsule      40 MG PO QDAY, #60 CAP 0 Refills         Reported         6/4/20       Simvastatin (Simvastatin*) 10 Mg Tablet      10 MG PO HS, #30 TAB 0 Refills         Reported         4/13/20       Lisinopril* (Lisinopril*) 2.5 Mg Tablet      2.5 PO QDAY, #30 TAB 0 Refills         Reported         12/22/17       Cetirizine Hcl (zyrTEC) 10 Mg Tablet      10 MG PO QDAY PRN for ALLERGIES, #30 TAB 0 Refills         Reported         10/12/17       Insulin Glargine (Lantus SOLOSTAR) 100 Unit/1 Ml Insuln.pen      40 UNITS SUBQ HS, #1 BOX 0 Refills         Reported         10/12/17            Past Medical,Surg,Family Hx      Past Medical History:  Diabetes Type 1, Thyroid Disease      Other Surgical History      Hernia surgery as a child      Family History:  Type I DM, Cardiovascual Disease            Social History      Smoking status:  Former smoker      Currently Vaping:  No            Review of Systems      General:  Denies: Appetite Change, Fatigue, Fever, Night Sweats, Weight Gain,     Weight Loss      ENT:  Denies Headache, Denies Hearing Loss, Denies Hoarseness, Denies Sore     Throat      Eye:  Admits Corrective Lenses; Denies Blurred Vision, Denies Diplopia, Denies     Vision Changes      Cardiovascular:  Denies Chest Pain, Denies Palpitations      Respiratory:  Admits: Cough (He has a bit of a cough which he relates to     seasonal allergies);          Denies: Coughing  Blood, Productive Cough, Shortness of Air, Wheezing      Gastrointestinal:  Denies Bloody Stools, Denies Constipation, Denies Diarrhea,     Denies Nausea/Vomiting, Denies Problem Swallowing, Denies Unable to Control     Bowels      Genitourinary:  Denies Blood in Urine, Denies Incontinence, Denies Painful     Urination      Musculoskeletal:  Denies Back Pain, Denies Muscle Pain, Denies Painful Joints      Integumentary:  Denies Itching, Denies Lesions, Denies Rash      Neurologic:  Denies Dizziness, Denies Numbness\Tingling, Denies Seizures      Psychiatric:  Denies Anxiety, Denies Depression      Endocrine:  Denies Cold Intolerance, Denies Heat Intolerance      Hematologic/Lymphatic:  Denies Bruising, Denies Bleeding, Denies Enlarged Lymph     Nodes            Most Recent Lab Findings      His most current A1c was collected on December 1, 2020 and was 6.3% indicating     controlled type 1 diabetes and improvement in the A1c down from 7% in September     of this year.            Item Value  Date Time             Hemoglobin A1c 6.3 % H 12/1/20 1028             Hemoglobin A1c 7.0 % H 9/1/20 1427            Exam      Constitutional/Appearance:  Well Nourished, No Acute Distress      Head/Face:  Atraumatic      Eyes:  Extracocular move intact, No Scleral Icterus      Respiratory:  Breathing comfortably, No Cough      Skin: General Appearance:  No Visable Rashes on face, No Lesions on face      Neurologic Orientation:  Grossly orientated to Person, Place, No Facial Drop      Psychiatric:  Normal Mood, Normal Affect            Plan and Patient Instructions      Ambulatory Assessment/Plan:        Type 1 diabetes mellitus without complications - E10.9            Notes      New Diagnostics      * Hemoglobin A1c, Routine         Dx: Type 1 diabetes mellitus without complications - E10.9      * Micro Alb UA HMH, Routine         Dx: Type 1 diabetes mellitus without complications - E10.9      Plan      No changes were made to  the patient's current treatment plan.  We will schedule     the patient for follow-up evaluation in 3 months.  We will collect a repeat A1c     as well as a urine microalbumin prior to that appointment.  He is to continue     monitoring his glucose levels 3-4 times each day.  If he has any problematic     glucose levels he will contact our office for discussion.      Instructions      * Chronic conditions reviewed and taken into consideration for today's treatment      plan.      * Patient instructed to seek medical attention urgently for new or worsening       symptoms.      * Patient was educated/instructed on their diagnosis, treatment and medications       prior to discharge from the Video and Audio visit today.            Electronically signed by SUZANNE BECKER  12/04/2020 12:49       Disclaimer: Converted document may not contain table formatting or lab diagrams. Please see [employer] [facility] for the authenticated document.

## 2021-06-05 NOTE — PROGRESS NOTES
Progress Note      Patient Name: George Hunter   Patient ID: [id number]   Sex: Male   YOB: 1980    Primary Care Provider: Indu العراقي   Referring Provider: Indu العراقي    Visit Date: May 20, 2021    Provider: MALCOM Rose   Location: [facility] [place]   Location Address: [address] [place] [address], [address] [place] [address]  [id number]   Location Phone: [phone]          Chief Complaint     The patient is here for nausea for a week.       History Of Present Illness  George Hunter is a 40 year old /White male who presents for evaluation and treatment of:      Diarrhea started Monday and took Immodium that helped some but didn't resolve.  Continues with diarrhea today.  Has tried Pepto Bismol.  Woke up this morning with vomiting.  has been nauseated all week but increased this am.  Denies blood in stool or vomit.  Denies a fever, or uri symptoms other than usual allergies.  Blood sugars have been in the 80-90's  Has vomited 4-5 times but has been drinking since and has held it down.       Past Medical History  Disease Name Date Onset Notes   Allergies --  --    Anxiety --  --    Broken Bones --  --    Deafness --  --    Diabetes --  --    Foot pain, bilateral 01/22/2018 --    Fracture --  --    Hammertoe --  --    Head injury --  --    Hernia --  --    Plantar fascial fibromatosis of both feet 01/22/2018 --    Toe pain, right 01/21/2021 --          Past Surgical History  Procedure Name Date Notes   Hernia --  --          Medication List  Name Date Started Instructions   Humalog 100 unit/mL subcutaneous cartridge  inject by subcutaneous route per prescriber's instructions. Insulin dosing requires individualization.   Lantus U-100 Insulin 100 unit/mL subcutaneous solution 10/03/2018 inject 40 units by subcutaneous route once   levothyroxine 25 mcg oral tablet 05/18/2020 take 1 tablet (25 mcg) by oral route once daily   lisinopril 2.5 mg oral tablet 10/03/2018  "take 1 tablet (2.5 mg) by oral route once daily for 30 days   Omeprazole 40 MG Oral Capsule Delayed Release 11/30/2020 TAKE 1 CAPSULE BY MOUTH ONCE DAILY BEFORE A MEAL   simvastatin 10 mg oral tablet  take 1 tablet (10 mg) by oral route once daily in the evening   Zithromax Z-Chas 250 mg oral tablet 01/18/2021 take 2 tablets (500 mg) by oral route once daily for 1 day then 1 tablet (250 mg) by oral route once daily for 4 days   Zyrtec 10 mg oral tablet  take 1 tablet (10 mg) by oral route once daily         Allergy List  Allergen Name Date Reaction Notes   Codiene --  --  --    PENICILLINS --  --  --          Family Medical History  Disease Name Relative/Age Notes   *No Known Family History  --          Social History  Finding Status Start/Stop Quantity Notes   Alcohol Never --/-- --  --    Tobacco Former --/-- 1-3 cigars /mo quit smoking cigarettes in 2011         Immunizations  NameDate Admin Mfg Trade Name Lot Number Route Inj VIS Given VIS Publication   TABBY Mckeon03/31/2021 NE Not Entered  NE NE 05/20/2021    Comments:    Dhbhowgmp28/14/2020 SKB Fluarix, quadrivalent, preservative free [id number] NE NE 01/18/2021    Comments: [employer]         Review of Systems  · Constitutional  o Denies  o : fever, fatigue, weight loss, weight gain  · Cardiovascular  o Denies  o : lower extremity edema, claudication, chest pressure, palpitations  · Respiratory  o Denies  o : shortness of breath, wheezing, cough, hemoptysis, dyspnea on exertion  · Gastrointestinal  o Admits  o : nausea, vomiting, diarrhea, abdominal pain  o Denies  o : constipation      Vitals  Date Time BP Position Site L\R Cuff Size HR RR TEMP (F) WT  HT  BMI kg/m2 BSA m2 O2 Sat FR L/min FiO2        05/20/2021 11:31 /82 Sitting    81 - R 16 97.8 222lbs 16oz 6'  1\" 29.42 2.28 95 %  21%          Physical Examination  · Constitutional  o Appearance  o : well-nourished, well developed, alert, in no acute distress  · Eyes  o Conjunctivae  o : conjunctivae " normal  o Sclerae  o : sclerae white  o Pupils and Irises  o : pupils equal, round, and reactive to light and accommodation bilaterally  o Corneas  o : tear film normal, no lesions present  o Eyelids/Ocular Adnexae  o : eyelid appearance normal, no exudates present, eye moisture level normal  · Respiratory  o Respiratory Effort  o : breathing unlabored  o Inspection of Chest  o : normal appearance, no retractions  o Auscultation of Lungs  o : normal breath sounds throughout  · Cardiovascular  o Heart  o :   § Auscultation of Heart  § : regular rate and rhythm without murmur, PMI normal  o Peripheral Vascular System  o :   § Pedal Pulses  § : bilateral pulse strength 2+  § Extremities  § : no cyanosis, clubbing or edema; less than 2 second refill noted  · Gastrointestinal  o Abdominal Examination  o : diffuse abdominal tenderness to palpation present, hyperactive bowels sounds present, tone normal without rigidity or guarding, no rebound tenderness present  · Musculoskeletal  o General  o : No joint swelling or deformity noted. Muscle tone, strength and development grossly normal.  · Skin and Subcutaneous Tissue  o General Inspection  o : no rashes or lesions present, no areas of discoloration  · Neurologic  o Mental Status Examination  o : judgement, insight intact, modd and affect appropriate  o Motor Examination  o : strength grossly intact in all four extremities  o Gait and Station  o : normal gait, able to stand without difficulty          Assessment  · Abdominal pain     789.00/R10.9  If abdominal pain localizes need to go to Ed.  · Diarrhea     787.91/R19.7  take off work today. If needed will get stool samples if diarrhea continues.  · Nausea & vomiting     787.01/R11.2      Plan  · Orders  o Amylase and lipase panel (85207, 69078) - 789.00/R10.9 - 05/20/2021  o CBC with Auto Diff [facility] (93191) - 789.00/R10.9 - 05/20/2021  o CMP [facility] (95617) - 789.00/R10.9 - 05/20/2021  o Urinalysis with Reflex Microscopy ([facility])  41588) - 787.91/R19.7 - 05/20/2021  o ACO-14: Influenza immunization administered or previously received [facility] () - - 05/20/2021  o ACO-39: Current medications updated and reviewed (1159F, ) - - 05/20/2021  · Medications  o ondansetron 8 mg oral tablet,disintegrating   SIG: take 1 tablet (8 mg) and place on top of the tongue where it will dissolve, then swallow by oral route 2 times per day   DISP: (20) Tablet with 0 refills  Prescribed on 05/20/2021     o Medications have been Reconciled  o Transition of Care or Provider Policy  · Instructions  o Instructed to seek medical attention urgently for new or worsening symptoms.  o Patient was educated/instructed on their diagnosis, treatment and medications prior to discharge from the clinic today.  o Minutes spent with patient including greater than 50% in Education/Counseling/Care Coordination.  o Time spent with the patient was minutes, more than 50% face to face.  o off work today  · Disposition  o Call or Return if symptoms worsen or persist.            Electronically Signed by: MALCOM Rose -Author on May 20, 2021 12:12:07 PM

## 2021-06-11 RX ORDER — OMEPRAZOLE 40 MG/1
CAPSULE, DELAYED RELEASE ORAL
Qty: 30 CAPSULE | Refills: 4 | Status: SHIPPED | OUTPATIENT
Start: 2021-06-11 | End: 2021-11-16

## 2021-07-15 VITALS
BODY MASS INDEX: 29.55 KG/M2 | OXYGEN SATURATION: 95 % | WEIGHT: 223 LBS | SYSTOLIC BLOOD PRESSURE: 132 MMHG | HEIGHT: 73 IN | HEART RATE: 81 BPM | TEMPERATURE: 97.8 F | DIASTOLIC BLOOD PRESSURE: 82 MMHG | RESPIRATION RATE: 16 BRPM

## 2021-08-05 ENCOUNTER — OFFICE VISIT (OUTPATIENT)
Dept: DIABETES SERVICES | Facility: HOSPITAL | Age: 41
End: 2021-08-05

## 2021-08-05 VITALS
WEIGHT: 215.61 LBS | SYSTOLIC BLOOD PRESSURE: 112 MMHG | TEMPERATURE: 97.5 F | DIASTOLIC BLOOD PRESSURE: 84 MMHG | HEART RATE: 80 BPM | BODY MASS INDEX: 28.58 KG/M2 | HEIGHT: 73 IN | OXYGEN SATURATION: 100 %

## 2021-08-05 DIAGNOSIS — E10.65 UNCONTROLLED TYPE 1 DIABETES MELLITUS WITH HYPERGLYCEMIA (HCC): Primary | ICD-10-CM

## 2021-08-05 LAB — HBA1C MFR BLD: 7.1 %

## 2021-08-05 PROCEDURE — 83036 HEMOGLOBIN GLYCOSYLATED A1C: CPT | Performed by: NURSE PRACTITIONER

## 2021-08-05 PROCEDURE — G0463 HOSPITAL OUTPT CLINIC VISIT: HCPCS | Performed by: NURSE PRACTITIONER

## 2021-08-05 PROCEDURE — 99214 OFFICE O/P EST MOD 30 MIN: CPT | Performed by: NURSE PRACTITIONER

## 2021-08-05 RX ORDER — INSULIN GLARGINE 100 [IU]/ML
INJECTION, SOLUTION SUBCUTANEOUS
COMMUNITY
Start: 2021-06-28 | End: 2021-08-09

## 2021-08-05 RX ORDER — SIMVASTATIN 10 MG
TABLET ORAL
COMMUNITY
End: 2021-11-11 | Stop reason: SDUPTHER

## 2021-08-05 RX ORDER — INSULIN LISPRO 100 [IU]/ML
INJECTION, SOLUTION INTRAVENOUS; SUBCUTANEOUS
COMMUNITY
End: 2021-08-16

## 2021-08-05 RX ORDER — LEVOTHYROXINE SODIUM 25 UG/1
25 TABLET ORAL DAILY
COMMUNITY
Start: 2021-07-12

## 2021-08-05 RX ORDER — INSULIN GLARGINE 100 [IU]/ML
35 INJECTION, SOLUTION SUBCUTANEOUS
COMMUNITY
End: 2021-08-09

## 2021-08-05 RX ORDER — INSULIN LISPRO 100 [IU]/ML
INJECTION, SOLUTION INTRAVENOUS; SUBCUTANEOUS
COMMUNITY
Start: 2021-06-28 | End: 2021-08-16

## 2021-08-05 RX ORDER — CETIRIZINE HYDROCHLORIDE 10 MG/1
TABLET ORAL
COMMUNITY
End: 2022-01-30

## 2021-08-05 NOTE — PROGRESS NOTES
Chief Complaint  Diabetes (follow up, figure out a new plan on what to do since he is not allowed to take needles into work and his blood sugar stays high during the day. )    Referred By: MALCOM Farias presents to [facility] for diabetes medication management    History of Present Illness    Visit type:  follow-up  Diabetes type:  Type 1  Current diabetes status/concerns/issues: He has recently gotten a new job working for the [email or web address] and states that they will not allow him to take his insulin or testing supplies in with him at work.  He works for 10-hour shifts each week.  Because of this he eats very little to prevent hypoglycemia while working.  This is caused weight loss of 8 pounds since May 20 of this year.    Other health concerns: None   Diabetes symptoms:  none reported at this time  Diabetes complications:  None  Hypoglycemia:  extremely rare  Hypoglycemia Symptoms:  shaking/tremors and hunger  Current diabetes treatment:  Lantus 40 units every day and Humalog using a 1-10 carbohydrate ratio to 1-20 correction for glucose levels greater than 120 mg/dL.  Blood glucose device:  Meter  Blood glucose monitoring frequency:  3 - 4  Blood glucose range/average:  170-185  Diet:  Carbohydrate Counting, Diet drinks only  Activity:  walking    Past Medical History:   Diagnosis Date   • Diabetes mellitus type I (CMS/HCC)    • Hypothyroidism      History reviewed. No pertinent surgical history.  Family History   Family history unknown: Yes     Social History     Socioeconomic History   • Marital status:      Spouse name: Not on file   • Number of children: Not on file   • Years of education: Not on file   • Highest education level: Not on file   Tobacco Use   • Smoking status: Never Smoker   • Smokeless tobacco: Never Used   Vaping Use   • Vaping Use: Never used   Substance and Sexual Activity   • Alcohol  use: Not Currently   • Drug use: Never   • Sexual activity: Defer     Allergies   Allergen Reactions   • Penicillins Anaphylaxis   • Codeine Itching       Current Outpatient Medications:   •  cetirizine (ZyrTEC Allergy) 10 MG tablet, Zyrtec 10 mg oral tablet take 1 tablet (10 mg) by oral route once daily   Active, Disp: , Rfl:   •  Euthyrox 25 MCG tablet, Take 25 mcg by mouth Daily., Disp: , Rfl:   •  insulin glargine (Lantus) 100 UNIT/ML injection, 35 mL., Disp: , Rfl:   •  Insulin Lispro (HumaLOG) 100 UNIT/ML solution cartridge, Humalog 100 unit/mL subcutaneous cartridge inject by subcutaneous route per prescriber's instructions. Insulin dosing requires individualization.   Active, Disp: , Rfl:   •  Insulin Lispro, 1 Unit Dial, (HUMALOG) 100 UNIT/ML solution pen-injector, INJECT 30 UNITS SUBCUTANEOUSLY THREE TIMES DAILY WITH MEALS, Disp: , Rfl:   •  Lantus SoloStar 100 UNIT/ML injection pen, INJECT 40 UNITS SUBCUTANEOUSLY AT BEDTIME, Disp: , Rfl:   •  simvastatin (ZOCOR) 10 MG tablet, simvastatin 10 mg oral tablet take 1 tablet (10 mg) by oral route once daily in the evening   Active, Disp: , Rfl:   •  omeprazole (priLOSEC) 40 MG capsule, TAKE 1 CAPSULE BY MOUTH ONCE DAILY BEFORE A MEAL, Disp: 30 capsule, Rfl: 4    Review of Systems   Constitutional: Negative for activity change, appetite change, fatigue, fever, unexpected weight gain and unexpected weight loss.   HENT: Positive for congestion (allergies). Negative for ear pain, facial swelling, hearing loss, sore throat and tinnitus.    Eyes: Negative for blurred vision, double vision, redness and visual disturbance.   Respiratory: Negative for cough, shortness of breath and wheezing.    Cardiovascular: Negative for chest pain, palpitations and leg swelling.   Gastrointestinal: Negative for abdominal distention, constipation, diarrhea, nausea, vomiting, GERD and indigestion.   Endocrine: Negative for polydipsia, polyphagia and polyuria.   Genitourinary: Negative  "for difficulty urinating, frequency and urgency.   Musculoskeletal: Negative for back pain, gait problem and myalgias.   Skin: Negative for rash, skin lesions and bruise.   Neurological: Negative for seizures, speech difficulty, weakness, headache and confusion.   Psychiatric/Behavioral: Negative for sleep disturbance, depressed mood and stress. The patient is not nervous/anxious.         Objective     Vitals:    08/05/21 1032   BP: 112/84   BP Location: Right arm   Patient Position: Sitting   Pulse: 80   Temp: 97.5 °F (36.4 °C)   SpO2: 100%   Weight: 97.8 kg (215 lb 9.8 oz)   Height: 185.4 cm (73\")   PainSc:   6   PainLoc: Hand     Body mass index is 28.45 kg/m².      Physical Exam  Constitutional:       Appearance: Normal appearance. He is normal weight.   HENT:      Head: Normocephalic and atraumatic.      Right Ear: External ear normal.      Left Ear: External ear normal.      Nose: Nose normal.   Eyes:      Extraocular Movements: Extraocular movements intact.      Conjunctiva/sclera: Conjunctivae normal.   Pulmonary:      Effort: Pulmonary effort is normal.   Musculoskeletal:         General: Normal range of motion.      Cervical back: Normal range of motion.   Skin:     General: Skin is warm and dry.   Neurological:      General: No focal deficit present.      Mental Status: He is alert and oriented to person, place, and time. Mental status is at baseline.   Psychiatric:         Mood and Affect: Mood normal.         Behavior: Behavior normal.         Thought Content: Thought content normal.         Judgment: Judgment normal.         Result Review :   The following data was reviewed by: MALCOM Silverman on 08/05/2021:        A point-of-care A1c was collected in the office today and was 7.1% indicating mildly uncontrolled type 1 diabetes.  This is up from a prior result of 6.7% collected and March of this year    Most Recent A1C    HGBA1C Most Recent 8/5/21   Hemoglobin A1C 7.1             A1C Last 3 " Results    HGBA1C Last 3 Results 9/1/20 12/1/20 8/5/21   Hemoglobin A1C 7.0 (A) 6.3 (A) 7.1   (A) Abnormal value       Comments are available for some flowsheets but are not being displayed.                   Assessment:  Diagnoses and all orders for this visit:    1. Uncontrolled type 1 diabetes mellitus with hyperglycemia (CMS/HCC) (Primary)  -     POC Glycosylated Hemoglobin (Hb A1C)        Plan: Because of his difficulties at work he question if he should split his dose of Lantus so that he has some coverage during the daytime hours while he is working.  He was advised to split the dose to 20 units in the morning 20 units in the evening and monitor glucose levels.  He can adjust the doses as needed to prevent hypoglycemia.  In the meantime, a letter will be written and sent to the [employer] [facility] requesting permission for the patient to have his insulin and blood glucose testing supplies immediately available.      The patient will monitor his blood glucose levels 4 times each day.  If he experiences any increased frequency or severity of hypoglycemia, or worsening hyperglycemia, he will contact the office for further instructions.      I spent 44 minutes caring for George on this date of service. This time includes time spent by me in the following activities:preparing for the visit, reviewing tests, obtaining and/or reviewing a separately obtained history, performing a medically appropriate examination and/or evaluation , counseling and educating the patient/family/caregiver, documenting information in the medical record and Composing letter for the patient's employer    Follow Up     Return in about 3 months (around 11/5/2021) for Medication Management.    Patient was given instructions and counseling regarding his condition or for health maintenance advice. Please see specific information pulled into the AVS if appropriate.     Indu Lloyd, APRN  08/05/2021

## 2021-08-09 RX ORDER — INSULIN GLARGINE 100 [IU]/ML
INJECTION, SOLUTION SUBCUTANEOUS
Qty: 15 ML | Refills: 5 | Status: SHIPPED | OUTPATIENT
Start: 2021-08-09 | End: 2021-08-17

## 2021-08-11 ENCOUNTER — OFFICE VISIT (OUTPATIENT)
Dept: FAMILY MEDICINE CLINIC | Facility: CLINIC | Age: 41
End: 2021-08-11

## 2021-08-11 VITALS
HEART RATE: 75 BPM | RESPIRATION RATE: 16 BRPM | DIASTOLIC BLOOD PRESSURE: 70 MMHG | BODY MASS INDEX: 41.03 KG/M2 | HEIGHT: 60 IN | OXYGEN SATURATION: 89 % | WEIGHT: 209 LBS | TEMPERATURE: 98.3 F | SYSTOLIC BLOOD PRESSURE: 130 MMHG

## 2021-08-11 DIAGNOSIS — J06.9 UPPER RESPIRATORY TRACT INFECTION, UNSPECIFIED TYPE: Primary | ICD-10-CM

## 2021-08-11 PROCEDURE — 99213 OFFICE O/P EST LOW 20 MIN: CPT | Performed by: NURSE PRACTITIONER

## 2021-08-11 RX ORDER — AZITHROMYCIN 250 MG/1
TABLET, FILM COATED ORAL
Qty: 6 TABLET | Refills: 0 | Status: SHIPPED | OUTPATIENT
Start: 2021-08-11 | End: 2021-09-30

## 2021-08-12 ENCOUNTER — TELEPHONE (OUTPATIENT)
Dept: DIABETES SERVICES | Facility: HOSPITAL | Age: 41
End: 2021-08-12

## 2021-08-12 NOTE — TELEPHONE ENCOUNTER
PATIENT CALLED REGARDING HIS LISPRO RX, PHARMACY SAYS WAITING ON PRIOR AUTH. PLEASE UPDATE MR. CHRISTOPHER ON PA STATUS.    New Rx for Novolog brand sent to pharmacy

## 2021-08-13 ENCOUNTER — HOSPITAL ENCOUNTER (EMERGENCY)
Facility: HOSPITAL | Age: 41
Discharge: HOME | End: 2021-08-13
Attending: EMERGENCY MEDICINE | Admitting: EMERGENCY MEDICINE

## 2021-08-13 ENCOUNTER — TELEPHONE (OUTPATIENT)
Dept: FAMILY MEDICINE CLINIC | Facility: CLINIC | Age: 41
End: 2021-08-13

## 2021-08-13 VITALS
BODY MASS INDEX: 27.61 KG/M2 | HEIGHT: 73 IN | DIASTOLIC BLOOD PRESSURE: 83 MMHG | SYSTOLIC BLOOD PRESSURE: 118 MMHG | OXYGEN SATURATION: 94 % | HEART RATE: 74 BPM | WEIGHT: 208.34 LBS | TEMPERATURE: 98.2 F | RESPIRATION RATE: 18 BRPM

## 2021-08-13 DIAGNOSIS — B34.9 VIRAL SYNDROME: Primary | ICD-10-CM

## 2021-08-13 DIAGNOSIS — R11.0 NAUSEA: ICD-10-CM

## 2021-08-13 DIAGNOSIS — R05.9 COUGH IN ADULT: ICD-10-CM

## 2021-08-13 DIAGNOSIS — J98.8 CONGESTION OF RESPIRATORY TRACT: ICD-10-CM

## 2021-08-13 LAB — SARS-COV-2 RNA RESP QL NAA+PROBE: NOT DETECTED

## 2021-08-13 PROCEDURE — 99283 EMERGENCY DEPT VISIT LOW MDM: CPT

## 2021-08-13 PROCEDURE — C9803 HOPD COVID-19 SPEC COLLECT: HCPCS | Performed by: NURSE PRACTITIONER

## 2021-08-13 PROCEDURE — 25010000002 DEXAMETHASONE PER 1 MG: Performed by: PHYSICIAN ASSISTANT

## 2021-08-13 PROCEDURE — 96372 THER/PROPH/DIAG INJ SC/IM: CPT

## 2021-08-13 PROCEDURE — 94640 AIRWAY INHALATION TREATMENT: CPT

## 2021-08-13 PROCEDURE — U0003 INFECTIOUS AGENT DETECTION BY NUCLEIC ACID (DNA OR RNA); SEVERE ACUTE RESPIRATORY SYNDROME CORONAVIRUS 2 (SARS-COV-2) (CORONAVIRUS DISEASE [COVID-19]), AMPLIFIED PROBE TECHNIQUE, MAKING USE OF HIGH THROUGHPUT TECHNOLOGIES AS DESCRIBED BY CMS-2020-01-R: HCPCS | Performed by: NURSE PRACTITIONER

## 2021-08-13 RX ORDER — ONDANSETRON 4 MG/1
4 TABLET, ORALLY DISINTEGRATING ORAL 4 TIMES DAILY PRN
Qty: 12 TABLET | Refills: 0 | OUTPATIENT
Start: 2021-08-13 | End: 2021-10-02

## 2021-08-13 RX ORDER — BROMPHENIRAMINE MALEATE, PSEUDOEPHEDRINE HYDROCHLORIDE, AND DEXTROMETHORPHAN HYDROBROMIDE 2; 30; 10 MG/5ML; MG/5ML; MG/5ML
10 SYRUP ORAL 4 TIMES DAILY PRN
Qty: 118 ML | Refills: 0 | OUTPATIENT
Start: 2021-08-13 | End: 2021-10-02

## 2021-08-13 RX ORDER — IPRATROPIUM BROMIDE AND ALBUTEROL SULFATE 2.5; .5 MG/3ML; MG/3ML
3 SOLUTION RESPIRATORY (INHALATION) ONCE
Status: COMPLETED | OUTPATIENT
Start: 2021-08-13 | End: 2021-08-13

## 2021-08-13 RX ORDER — DEXAMETHASONE SODIUM PHOSPHATE 4 MG/ML
4 INJECTION, SOLUTION INTRA-ARTICULAR; INTRALESIONAL; INTRAMUSCULAR; INTRAVENOUS; SOFT TISSUE ONCE
Status: COMPLETED | OUTPATIENT
Start: 2021-08-13 | End: 2021-08-13

## 2021-08-13 RX ORDER — IPRATROPIUM BROMIDE AND ALBUTEROL SULFATE 2.5; .5 MG/3ML; MG/3ML
SOLUTION RESPIRATORY (INHALATION)
Status: DISCONTINUED
Start: 2021-08-13 | End: 2021-08-13 | Stop reason: WASHOUT

## 2021-08-13 RX ADMIN — DEXAMETHASONE SODIUM PHOSPHATE 4 MG: 4 INJECTION INTRA-ARTICULAR; INTRALESIONAL; INTRAMUSCULAR; INTRAVENOUS; SOFT TISSUE at 11:37

## 2021-08-13 RX ADMIN — IPRATROPIUM BROMIDE AND ALBUTEROL SULFATE 3 ML: .5; 3 SOLUTION RESPIRATORY (INHALATION) at 11:44

## 2021-08-16 RX ORDER — INSULIN ASPART 100 [IU]/ML
30 INJECTION, SOLUTION INTRAVENOUS; SUBCUTANEOUS
Qty: 9 PEN | Refills: 5 | Status: SHIPPED | OUTPATIENT
Start: 2021-08-16 | End: 2021-09-02 | Stop reason: SDUPTHER

## 2021-08-17 RX ORDER — INSULIN GLARGINE 100 [IU]/ML
40 INJECTION, SOLUTION SUBCUTANEOUS DAILY
Qty: 4 PEN | Refills: 5 | Status: SHIPPED | OUTPATIENT
Start: 2021-08-17 | End: 2022-04-27

## 2021-09-02 RX ORDER — INSULIN ASPART 100 [IU]/ML
30 INJECTION, SOLUTION INTRAVENOUS; SUBCUTANEOUS
Qty: 9 PEN | Refills: 5 | Status: SHIPPED | OUTPATIENT
Start: 2021-09-02 | End: 2022-10-16

## 2021-09-30 ENCOUNTER — OFFICE VISIT (OUTPATIENT)
Dept: PODIATRY | Facility: CLINIC | Age: 41
End: 2021-09-30

## 2021-09-30 VITALS
SYSTOLIC BLOOD PRESSURE: 116 MMHG | TEMPERATURE: 97.8 F | HEART RATE: 64 BPM | BODY MASS INDEX: 28.1 KG/M2 | DIASTOLIC BLOOD PRESSURE: 81 MMHG | WEIGHT: 212 LBS | HEIGHT: 73 IN | OXYGEN SATURATION: 95 %

## 2021-09-30 DIAGNOSIS — E11.9 TYPE 2 DIABETES MELLITUS WITHOUT COMPLICATION, WITH LONG-TERM CURRENT USE OF INSULIN (HCC): Primary | ICD-10-CM

## 2021-09-30 DIAGNOSIS — E11.8 DIABETIC FOOT (HCC): ICD-10-CM

## 2021-09-30 DIAGNOSIS — Z79.4 TYPE 2 DIABETES MELLITUS WITHOUT COMPLICATION, WITH LONG-TERM CURRENT USE OF INSULIN (HCC): Primary | ICD-10-CM

## 2021-09-30 PROCEDURE — 99203 OFFICE O/P NEW LOW 30 MIN: CPT | Performed by: PODIATRIST

## 2021-11-11 ENCOUNTER — OFFICE VISIT (OUTPATIENT)
Dept: DIABETES SERVICES | Facility: HOSPITAL | Age: 41
End: 2021-11-11

## 2021-11-11 VITALS
RESPIRATION RATE: 24 BRPM | HEIGHT: 73 IN | OXYGEN SATURATION: 94 % | HEART RATE: 71 BPM | WEIGHT: 214.73 LBS | DIASTOLIC BLOOD PRESSURE: 80 MMHG | TEMPERATURE: 97.9 F | BODY MASS INDEX: 28.46 KG/M2 | SYSTOLIC BLOOD PRESSURE: 122 MMHG

## 2021-11-11 DIAGNOSIS — E10.65 UNCONTROLLED TYPE 1 DIABETES MELLITUS WITH HYPERGLYCEMIA (HCC): Primary | ICD-10-CM

## 2021-11-11 DIAGNOSIS — E66.3 OVERWEIGHT (BMI 25.0-29.9): ICD-10-CM

## 2021-11-11 LAB
EXPIRATION DATE: ABNORMAL
GLUCOSE BLDC GLUCOMTR-MCNC: 194 MG/DL (ref 70–99)
HBA1C MFR BLD: 7.2 %
Lab: ABNORMAL

## 2021-11-11 PROCEDURE — G0463 HOSPITAL OUTPT CLINIC VISIT: HCPCS | Performed by: NURSE PRACTITIONER

## 2021-11-11 PROCEDURE — 99213 OFFICE O/P EST LOW 20 MIN: CPT | Performed by: NURSE PRACTITIONER

## 2021-11-11 PROCEDURE — 83036 HEMOGLOBIN GLYCOSYLATED A1C: CPT | Performed by: NURSE PRACTITIONER

## 2021-11-11 PROCEDURE — 82962 GLUCOSE BLOOD TEST: CPT | Performed by: NURSE PRACTITIONER

## 2021-11-11 RX ORDER — SIMVASTATIN 10 MG
10 TABLET ORAL NIGHTLY
Qty: 30 TABLET | Refills: 3 | Status: SHIPPED | OUTPATIENT
Start: 2021-11-11

## 2021-11-11 RX ORDER — LISINOPRIL 2.5 MG/1
2.5 TABLET ORAL DAILY
Qty: 30 TABLET | Refills: 3 | Status: SHIPPED | OUTPATIENT
Start: 2021-11-11

## 2021-11-11 NOTE — PROGRESS NOTES
Chief Complaint  Diabetes (extremley fatigue- can not stay awake at times even with 8 hours of sleep. blood sugars are a little higher in the morning - needs refills and new scripts for some meds )    Referred By: MALCOM Farias presents to [facility] for diabetes medication management    History of Present Illness    Visit type:  follow-up  Diabetes type:  Type 1  Current diabetes status/concerns/issues: Patient reports his blood sugars have been higher in the morning recently after switching to the Basaglar insulin  Other health concerns: He is having excessive fatigue even after sleeping 8 hours he has difficulty staying awake during the day  Diabetes symptoms:    Polyuria: No   Polydipsia: No   Polyphagia: No   Blurred vision: No   Excessive fatigue: Yes  Diabetes complications:  Neuropathy:No  Nephropathy:No  Retinopathy:No  Amputation/Wounds:No  Gastroparesis:No  Cardiovascular Disease:No  Erectile Dysfunction:No  Hypoglycemia:  None reported at this time  Hypoglycemia Symptoms:  No hypoglycemia at this time  Current diabetes treatment:  Basaglar 40 units daily and Humalog using a 1-10 carbohydrate ratio to 1-20 correction for glucose levels greater than 120 mg/dL  Blood glucose device:  Meter  Blood glucose monitoring frequency:  4  Blood glucose range/average:  120-160  Diet:  Carbohydrate Counting, Avoids high carb/sweet foods, Diet drinks only  Activity:  He does a lot of walking at work    Past Medical History:   Diagnosis Date   • Allergies    • Anxiety    • Broken bones    • Deafness    • Diabetes (HCC)    • Diabetes mellitus type I (HCC)    • Foot pain, bilateral 2018   • Fracture    • Hammertoe    • Head injury    • Hernia cerebri (HCC)    • Hyperlipidemia    • Hypothyroidism    • Plantar fascial fibromatosis of both feet 2018   • Toe pain, right      Past Surgical History:   Procedure Laterality Date   • HERNIA REPAIR        Family History   Family history unknown: Yes     Social History     Socioeconomic History   • Marital status:    Tobacco Use   • Smoking status: Never Smoker   • Smokeless tobacco: Never Used   Vaping Use   • Vaping Use: Never used   Substance and Sexual Activity   • Alcohol use: Not Currently   • Drug use: Never   • Sexual activity: Defer     Allergies   Allergen Reactions   • Penicillins Anaphylaxis     Childhood allergy     • Codeine Itching       Current Outpatient Medications:   •  cetirizine (ZyrTEC Allergy) 10 MG tablet, Zyrtec 10 mg oral tablet take 1 tablet (10 mg) by oral route once daily   Active, Disp: , Rfl:   •  Euthyrox 25 MCG tablet, Take 25 mcg by mouth Daily., Disp: , Rfl:   •  insulin aspart (NovoLOG FlexPen) 100 UNIT/ML solution pen-injector sc pen, Inject 30 Units under the skin into the appropriate area as directed 3 (Three) Times a Day With Meals., Disp: 9 pen, Rfl: 5  •  Insulin Glargine (BASAGLAR KWIKPEN) 100 UNIT/ML injection pen, Inject 40 Units under the skin into the appropriate area as directed Daily., Disp: 4 pen, Rfl: 5  •  omeprazole (priLOSEC) 40 MG capsule, TAKE 1 CAPSULE BY MOUTH ONCE DAILY BEFORE A MEAL, Disp: 30 capsule, Rfl: 4  •  simvastatin (ZOCOR) 10 MG tablet, Take 1 tablet by mouth Every Night., Disp: 30 tablet, Rfl: 3  •  lisinopril (Zestril) 2.5 MG tablet, Take 1 tablet by mouth Daily., Disp: 30 tablet, Rfl: 3    Review of Systems   Constitutional: Positive for fatigue. Negative for activity change, appetite change, fever, unexpected weight gain and unexpected weight loss.   HENT: Negative for congestion, ear pain, facial swelling, hearing loss, sore throat and tinnitus.    Eyes: Negative for blurred vision, double vision, redness and visual disturbance.   Respiratory: Negative for cough, shortness of breath and wheezing.    Cardiovascular: Negative for chest pain, palpitations and leg swelling.   Gastrointestinal: Negative for abdominal distention,  "constipation, diarrhea, nausea, vomiting, GERD and indigestion.   Endocrine: Negative for polydipsia, polyphagia and polyuria.   Genitourinary: Negative for difficulty urinating, frequency and urgency.   Musculoskeletal: Negative for back pain, gait problem and myalgias.   Skin: Negative for rash, skin lesions and bruise.   Neurological: Negative for seizures, speech difficulty, weakness, headache and confusion.   Psychiatric/Behavioral: Positive for sleep disturbance. Negative for depressed mood and stress. The patient is not nervous/anxious.         Objective     Vitals:    11/11/21 0831   BP: 122/80   BP Location: Left arm   Patient Position: Sitting   Cuff Size: Adult   Pulse: 71   Resp: 24   Temp: 97.9 °F (36.6 °C)   SpO2: 94%   Weight: 97.4 kg (214 lb 11.7 oz)   Height: 185.4 cm (73\")   PainSc: 0-No pain     Body mass index is 28.33 kg/m².    Physical Exam  Constitutional:       Appearance: Normal appearance.      Comments: Overweight with BMI of 28.33   HENT:      Head: Normocephalic and atraumatic.      Right Ear: External ear normal.      Left Ear: External ear normal.      Nose: Nose normal.   Eyes:      Extraocular Movements: Extraocular movements intact.      Conjunctiva/sclera: Conjunctivae normal.   Pulmonary:      Effort: Pulmonary effort is normal.   Musculoskeletal:         General: Normal range of motion.      Cervical back: Normal range of motion.   Skin:     General: Skin is warm and dry.   Neurological:      General: No focal deficit present.      Mental Status: He is alert and oriented to person, place, and time. Mental status is at baseline.   Psychiatric:         Mood and Affect: Mood normal.         Behavior: Behavior normal.         Thought Content: Thought content normal.         Judgment: Judgment normal.         Result Review :   The following data was reviewed by: MALCOM Silverman on 11/11/2021:        Point-of-care A1c in the office today was 7.2% indicating uncontrolled type " 1 diabetes.  This is up slightly from the prior result of 7.1 collected in August of this year    Most Recent A1C    HGBA1C Most Recent 11/11/21   Hemoglobin A1C 7.2             A1C Last 3 Results    HGBA1C Last 3 Results 12/1/20 8/5/21 11/11/21   Hemoglobin A1C 6.3 (A) 7.1 7.2   (A) Abnormal value       Comments are available for some flowsheets but are not being displayed.             Glucose   Date Value Ref Range Status   11/11/2021 194 (H) 70 - 99 mg/dL Final     Comment:     Serial Number: [id number]:  [id number]               Assessment: The patient has been able to maintain his A1c fairly well controlled. He has had a slight increase in comparison to the last result. His major concern today is the excessive fatigue. He appears fatigued at today's appointment. It was suggested the patient follow-up with his primary care provider to request a sleep study.      Diagnoses and all orders for this visit:    1. Uncontrolled type 1 diabetes mellitus with hyperglycemia (HCC) (Primary)  -     POC Glycosylated Hemoglobin (Hb A1C)    2. Overweight (BMI 25.0-29.9)    Other orders  -     SCANNED - EYE EXAM  -     POC Glucose  -     simvastatin (ZOCOR) 10 MG tablet; Take 1 tablet by mouth Every Night.  Dispense: 30 tablet; Refill: 3  -     lisinopril (Zestril) 2.5 MG tablet; Take 1 tablet by mouth Daily.  Dispense: 30 tablet; Refill: 3        Plan: No changes were made to his current treatment plan. He was encouraged to focus on accurate carbohydrate counting to prevent postprandial hyperglycemia. I will contact his PCP and suggest the sleep study.    The patient will monitor his blood glucose levels 4-5 times each day.  If he develops hypoglycemia or experiences any increased frequency or severity of hypoglycemia, he will contact the office for further instructions.        Follow Up     Return in about 3 months (around 2/11/2022) for Medication Management.    Patient was given instructions and counseling regarding  his condition or for health maintenance advice. Please see specific information pulled into the AVS if appropriate.     Indu Lloyd, APRN  11/11/2021

## 2021-11-16 RX ORDER — OMEPRAZOLE 40 MG/1
CAPSULE, DELAYED RELEASE ORAL
Qty: 30 CAPSULE | Refills: 0 | Status: SHIPPED | OUTPATIENT
Start: 2021-11-16 | End: 2021-12-17

## 2021-12-17 RX ORDER — OMEPRAZOLE 40 MG/1
CAPSULE, DELAYED RELEASE ORAL
Qty: 30 CAPSULE | Refills: 0 | Status: SHIPPED | OUTPATIENT
Start: 2021-12-17 | End: 2022-01-24

## 2021-12-18 ENCOUNTER — HOSPITAL ENCOUNTER (EMERGENCY)
Facility: HOSPITAL | Age: 41
Discharge: HOME | End: 2021-12-18
Attending: EMERGENCY MEDICINE | Admitting: EMERGENCY MEDICINE

## 2021-12-18 ENCOUNTER — APPOINTMENT (OUTPATIENT)
Dept: CT IMAGING | Facility: HOSPITAL | Age: 41
End: 2021-12-18

## 2021-12-18 VITALS
TEMPERATURE: 98.4 F | DIASTOLIC BLOOD PRESSURE: 85 MMHG | BODY MASS INDEX: 27.87 KG/M2 | OXYGEN SATURATION: 95 % | RESPIRATION RATE: 18 BRPM | SYSTOLIC BLOOD PRESSURE: 119 MMHG | HEIGHT: 73 IN | WEIGHT: 210.32 LBS | HEART RATE: 61 BPM

## 2021-12-18 DIAGNOSIS — S90.122A CONTUSION OF FIFTH TOE OF LEFT FOOT, INITIAL ENCOUNTER: ICD-10-CM

## 2021-12-18 DIAGNOSIS — R11.14 BILIOUS VOMITING WITH NAUSEA: Primary | ICD-10-CM

## 2021-12-18 LAB
ALBUMIN SERPL-MCNC: 4.2 G/DL (ref 3.5–5.2)
ALBUMIN/GLOB SERPL: 1.6 G/DL
ALP SERPL-CCNC: 93 U/L (ref 39–117)
ALT SERPL W P-5'-P-CCNC: 29 U/L (ref 1–41)
ANION GAP SERPL CALCULATED.3IONS-SCNC: 9.4 MMOL/L (ref 5–15)
AST SERPL-CCNC: 29 U/L (ref 1–40)
BASOPHILS # BLD AUTO: 0.05 10*3/MM3 (ref 0–0.2)
BASOPHILS NFR BLD AUTO: 0.7 % (ref 0–1.5)
BILIRUB SERPL-MCNC: 0.6 MG/DL (ref 0–1.2)
BILIRUB UR QL STRIP: NEGATIVE
BUN SERPL-MCNC: 13 MG/DL (ref 6–20)
BUN/CREAT SERPL: 13 (ref 7–25)
CALCIUM SPEC-SCNC: 9.2 MG/DL (ref 8.6–10.5)
CHLORIDE SERPL-SCNC: 102 MMOL/L (ref 98–107)
CLARITY UR: CLEAR
CO2 SERPL-SCNC: 26.6 MMOL/L (ref 22–29)
COLOR UR: YELLOW
CREAT SERPL-MCNC: 1 MG/DL (ref 0.76–1.27)
DEPRECATED RDW RBC AUTO: 37.9 FL (ref 37–54)
EOSINOPHIL # BLD AUTO: 0.16 10*3/MM3 (ref 0–0.4)
EOSINOPHIL NFR BLD AUTO: 2.2 % (ref 0.3–6.2)
ERYTHROCYTE [DISTWIDTH] IN BLOOD BY AUTOMATED COUNT: 11.9 % (ref 12.3–15.4)
GFR SERPL CREATININE-BSD FRML MDRD: 82 ML/MIN/1.73
GLOBULIN UR ELPH-MCNC: 2.6 GM/DL
GLUCOSE SERPL-MCNC: 143 MG/DL (ref 65–99)
GLUCOSE UR STRIP-MCNC: NEGATIVE MG/DL
HCT VFR BLD AUTO: 47.6 % (ref 37.5–51)
HGB BLD-MCNC: 17 G/DL (ref 13–17.7)
HGB UR QL STRIP.AUTO: NEGATIVE
HOLD SPECIMEN: NORMAL
HOLD SPECIMEN: NORMAL
IMM GRANULOCYTES # BLD AUTO: 0.01 10*3/MM3 (ref 0–0.05)
IMM GRANULOCYTES NFR BLD AUTO: 0.1 % (ref 0–0.5)
KETONES UR QL STRIP: NEGATIVE
LEUKOCYTE ESTERASE UR QL STRIP.AUTO: NEGATIVE
LIPASE SERPL-CCNC: 18 U/L (ref 13–60)
LYMPHOCYTES # BLD AUTO: 1.67 10*3/MM3 (ref 0.7–3.1)
LYMPHOCYTES NFR BLD AUTO: 23.3 % (ref 19.6–45.3)
MCH RBC QN AUTO: 31 PG (ref 26.6–33)
MCHC RBC AUTO-ENTMCNC: 35.7 G/DL (ref 31.5–35.7)
MCV RBC AUTO: 86.7 FL (ref 79–97)
MONOCYTES # BLD AUTO: 0.41 10*3/MM3 (ref 0.1–0.9)
MONOCYTES NFR BLD AUTO: 5.7 % (ref 5–12)
NEUTROPHILS NFR BLD AUTO: 4.86 10*3/MM3 (ref 1.7–7)
NEUTROPHILS NFR BLD AUTO: 68 % (ref 42.7–76)
NITRITE UR QL STRIP: NEGATIVE
NRBC BLD AUTO-RTO: 0 /100 WBC (ref 0–0.2)
PH UR STRIP.AUTO: 7 [PH] (ref 5–8)
PLATELET # BLD AUTO: 247 10*3/MM3 (ref 140–450)
PMV BLD AUTO: 9.9 FL (ref 6–12)
POTASSIUM SERPL-SCNC: 4.2 MMOL/L (ref 3.5–5.2)
PROT SERPL-MCNC: 6.8 G/DL (ref 6–8.5)
PROT UR QL STRIP: NEGATIVE
RBC # BLD AUTO: 5.49 10*6/MM3 (ref 4.14–5.8)
SODIUM SERPL-SCNC: 138 MMOL/L (ref 136–145)
SP GR UR STRIP: 1.01 (ref 1–1.03)
UROBILINOGEN UR QL STRIP: NORMAL
WBC NRBC COR # BLD: 7.16 10*3/MM3 (ref 3.4–10.8)
WHOLE BLOOD HOLD SPECIMEN: NORMAL
WHOLE BLOOD HOLD SPECIMEN: NORMAL

## 2021-12-18 PROCEDURE — 80053 COMPREHEN METABOLIC PANEL: CPT | Performed by: EMERGENCY MEDICINE

## 2021-12-18 PROCEDURE — 81003 URINALYSIS AUTO W/O SCOPE: CPT | Performed by: EMERGENCY MEDICINE

## 2021-12-18 PROCEDURE — 74177 CT ABD & PELVIS W/CONTRAST: CPT

## 2021-12-18 PROCEDURE — 99283 EMERGENCY DEPT VISIT LOW MDM: CPT

## 2021-12-18 PROCEDURE — 25010000002 ONDANSETRON PER 1 MG: Performed by: NURSE PRACTITIONER

## 2021-12-18 PROCEDURE — 0 IOPAMIDOL PER 1 ML: Performed by: EMERGENCY MEDICINE

## 2021-12-18 PROCEDURE — 96366 THER/PROPH/DIAG IV INF ADDON: CPT

## 2021-12-18 PROCEDURE — 85025 COMPLETE CBC W/AUTO DIFF WBC: CPT | Performed by: EMERGENCY MEDICINE

## 2021-12-18 PROCEDURE — 96365 THER/PROPH/DIAG IV INF INIT: CPT

## 2021-12-18 PROCEDURE — 83690 ASSAY OF LIPASE: CPT | Performed by: EMERGENCY MEDICINE

## 2021-12-18 RX ORDER — SODIUM CHLORIDE 0.9 % (FLUSH) 0.9 %
10 SYRINGE (ML) INJECTION AS NEEDED
Status: DISCONTINUED | OUTPATIENT
Start: 2021-12-18 | End: 2021-12-18 | Stop reason: HOSPADM

## 2021-12-18 RX ORDER — PROMETHAZINE HYDROCHLORIDE 25 MG/1
25 TABLET ORAL EVERY 6 HOURS PRN
Qty: 10 TABLET | Refills: 0 | Status: SHIPPED | OUTPATIENT
Start: 2021-12-18 | End: 2022-02-24

## 2021-12-18 RX ORDER — SODIUM CHLORIDE 9 MG/ML
INJECTION, SOLUTION INTRAVENOUS
Status: COMPLETED
Start: 2021-12-18 | End: 2021-12-18

## 2021-12-18 RX ADMIN — ONDANSETRON 8 MG: 2 INJECTION INTRAMUSCULAR; INTRAVENOUS at 09:10

## 2021-12-18 RX ADMIN — SODIUM CHLORIDE: 9 INJECTION, SOLUTION INTRAVENOUS at 09:15

## 2021-12-18 RX ADMIN — SODIUM CHLORIDE 1000 ML: 9 INJECTION, SOLUTION INTRAVENOUS at 09:10

## 2021-12-18 RX ADMIN — IOPAMIDOL 100 ML: 755 INJECTION, SOLUTION INTRAVENOUS at 10:01

## 2021-12-18 NOTE — ED PROVIDER NOTES
Subjective   Patient complaining of nausea and vomiting starting this morning.  Patient also seen is having some mild left lower abdominal cramping.  Patient states he took two Zofran with no resolution.  Patient denies any additional symptoms and or concerns at this time.      History provided by:  Patient   used: No    Abdominal Pain  Pain location:  LLQ  Pain quality: cramping    Pain radiates to:  Does not radiate  Pain severity:  Mild  Onset quality:  Sudden  Duration: Starting this morning.  Timing:  Intermittent  Progression:  Waxing and waning  Chronicity:  New  Context: awakening from sleep    Context: not alcohol use, not diet changes, not eating, not laxative use, not medication withdrawal, not recent illness, not recent sexual activity, not recent travel, not retching, not sick contacts, not suspicious food intake and not trauma    Relieved by:  Nothing  Worsened by:  Nothing  Ineffective treatments: Zofran.  Associated symptoms: nausea and vomiting    Associated symptoms: no anorexia, no belching, no chest pain, no chills, no constipation, no cough, no diarrhea, no dysuria, no fatigue, no fever, no flatus, no hematemesis, no hematochezia, no hematuria, no melena, no shortness of breath and no sore throat    Vomiting  The primary symptoms include abdominal pain, nausea and vomiting. Primary symptoms do not include fever, fatigue, diarrhea, melena, hematemesis, hematochezia or dysuria.   The illness does not include chills, anorexia or constipation.       Review of Systems   Constitutional: Negative for chills, fatigue and fever.   HENT: Negative for congestion, ear pain and sore throat.    Eyes: Negative for pain.   Respiratory: Negative for cough, chest tightness and shortness of breath.    Cardiovascular: Negative for chest pain.   Gastrointestinal: Positive for abdominal pain, nausea and vomiting. Negative for anorexia, constipation, diarrhea, flatus, hematemesis, hematochezia and  melena.        Patient complain of mild left lower quadrant abdominal cramping with nausea and vomiting that started this morning.   Genitourinary: Negative for dysuria, flank pain and hematuria.   Musculoskeletal: Negative for joint swelling.   Skin: Negative for pallor.   Neurological: Negative for seizures and headaches.   Psychiatric/Behavioral: Negative.    All other systems reviewed and are negative.      Past Medical History:   Diagnosis Date   • Allergies    • Anxiety    • Broken bones    • Deafness    • Diabetes ([facility])    • Diabetes mellitus type I ([facility])    • Foot pain, bilateral 2018   • Fracture    • Hammertoe    • Head injury    • Hernia cerebri (HCC)    • Hyperlipidemia    • Hypothyroidism    • Plantar fascial fibromatosis of both feet 2018   • Toe pain, right        Allergies   Allergen Reactions   • Penicillins Anaphylaxis     Childhood allergy     • Codeine Itching       Past Surgical History:   Procedure Laterality Date   • HERNIA REPAIR         Family History   Family history unknown: Yes       Social History     Socioeconomic History   • Marital status:    Tobacco Use   • Smoking status: Never Smoker   • Smokeless tobacco: Never Used   Vaping Use   • Vaping Use: Never used   Substance and Sexual Activity   • Alcohol use: Not Currently   • Drug use: Never   • Sexual activity: Defer           Objective   Physical Exam  Vitals and nursing note reviewed.   Constitutional:       General: He is not in acute distress.     Appearance: Normal appearance. He is well-developed and normal weight. He is not ill-appearing, toxic-appearing or diaphoretic.   HENT:      Head: Normocephalic and atraumatic.      Mouth/Throat:      Mouth: Mucous membranes are moist.   Eyes:      General: No scleral icterus.     Extraocular Movements: Extraocular movements intact.      Pupils: Pupils are equal, round, and reactive to light.   Cardiovascular:      Rate and Rhythm: Normal rate and regular rhythm.      Pulses:  Normal pulses.      Heart sounds: Normal heart sounds.   Pulmonary:      Effort: Pulmonary effort is normal. No respiratory distress.      Breath sounds: Normal breath sounds.   Abdominal:      General: Abdomen is flat.      Palpations: Abdomen is soft.      Tenderness: There is abdominal tenderness in the left lower quadrant. There is no right CVA tenderness, left CVA tenderness, guarding or rebound. Negative signs include Cedeno's sign, Rovsing's sign, McBurney's sign, psoas sign and obturator sign.   Musculoskeletal:         General: Normal range of motion.      Cervical back: Normal range of motion and neck supple.        Feet:    Skin:     General: Skin is warm and dry.   Neurological:      General: No focal deficit present.      Mental Status: He is alert and oriented to person, place, and time. Mental status is at baseline.   Psychiatric:         Mood and Affect: Mood normal.         Behavior: Behavior normal.         Procedures           ED Course                                             Will place patient in a postop shoe and josephine tape left little toe to aid in the healing process.    MDM  Number of Diagnoses or Management Options  Bilious vomiting with nausea  Contusion of fifth toe of left foot, initial encounter  Diagnosis management comments: I have spoken with Mr. Hunter. I have explained the patient´s condition, diagnoses and treatment plan based on the information available to me at this time. I have answered the patient questions and addressed any concerns. The patient has a good  understanding of the patient´s diagnosis, condition, and treatment plan as can be expected at this point. The vital signs have been stable. The patient´s condition is stable and appropriate for discharge from the emergency department.      The patient will pursue further outpatient evaluation with the primary care physician or other designated or consulting physician as outlined in the discharge instructions. They are  agreeable to this plan of care and follow-up instructions have been explained in detail. The patient has received these instructions in written format and have expressed an understanding of the discharge instructions. The patient is aware that any significant change in condition or worsening of symptoms should prompt an immediate return to this or the closest emergency department or call to 911.       Amount and/or Complexity of Data Reviewed  Clinical lab tests: reviewed  Tests in the radiology section of CPT®: reviewed    Risk of Complications, Morbidity, and/or Mortality  Presenting problems: moderate  Diagnostic procedures: low  Management options: low    Patient Progress  Patient progress: stable      Final diagnoses:   Bilious vomiting with nausea   Contusion of fifth toe of left foot, initial encounter       ED Disposition  ED Disposition     ED Disposition Condition Comment    Discharge Stable           Jak Colindres, APRN  [address] [facility] [address]  [facility] [address] [phone]    In 3 days           Medication List      New Prescriptions    promethazine 25 MG tablet  Commonly known as: PHENERGAN  Take 1 tablet by mouth Every 6 (Six) Hours As Needed for Nausea or Vomiting.           Where to Get Your Medications      These medications were sent to [address] [place] - [address] [facility] - [phone]  - [phone] FX  [address] [facility] [address]    Phone: [phone]   · promethazine 25 MG tablet          Dhiraj Serrato APRN  12/18/21 7105

## 2021-12-20 ENCOUNTER — TELEMEDICINE (OUTPATIENT)
Dept: FAMILY MEDICINE CLINIC | Facility: TELEHEALTH | Age: 41
End: 2021-12-20

## 2021-12-20 DIAGNOSIS — B34.9 VIRAL ILLNESS: Primary | ICD-10-CM

## 2021-12-20 PROCEDURE — 99212 OFFICE O/P EST SF 10 MIN: CPT | Performed by: NURSE PRACTITIONER

## 2021-12-20 NOTE — PATIENT INSTRUCTIONS
Go to the nearest [facility] for your Covid-19 test. Wear a mask. When you arrive, notify the staff that you have done a virtual visit and have a covid test ordered. You will not need to be seen again by a provider. You will be notified in 1-5 days about the results of your test, by telephone call from a blocked cell number, and via [address].      Viral Illness, Adult  Viruses are tiny germs that can get into a person's body and cause illness. There are many different types of viruses, and they cause many types of illness. Viral illnesses can range from mild to severe. They can affect various parts of the body.  Short-term conditions that are caused by a virus include colds and the flu (influenza). Long-term conditions that are caused by a virus include herpes, shingles, and HIV (human immunodeficiency virus) infection. A few viruses have been linked to certain cancers.  What are the causes?  Many types of viruses can cause illness. Viruses invade cells in your body, multiply, and cause the infected cells to work abnormally or die. When these cells die, they release more of the virus. When this happens, you develop symptoms of the illness, and the virus continues to spread to other cells. If the virus takes over the function of the cell, it can cause the cell to divide and grow out of control. This happens when a virus causes cancer.  Different viruses get into the body in different ways. You can get a virus by:  · Swallowing food or water that has come in contact with the virus (is contaminated).  · Breathing in droplets that have been coughed or sneezed into the air by an infected person.  · Touching a surface that has been contaminated with the virus and then touching your eyes, nose, or mouth.  · Being bitten by an insect or animal that carries the virus.  · Having sexual contact with a person who is infected with the virus.  · Being exposed to blood or fluids that contain the virus, either through an  open cut or during a transfusion.  If a virus enters your body, your body's defense system (immune system) will try to fight the virus. You may be at higher risk for a viral illness if your immune system is weak.  What are the signs or symptoms?  You may have these symptoms, depending on the type of virus and the location of the cells that it invades:  · Cold and flu viruses:  ? Fever.  ? Headache.  ? Sore throat.  ? Muscle aches.  ? Stuffy nose (nasal congestion).  ? Cough.  · Digestive system (gastrointestinal) viruses:  ? Fever.  ? Pain in the abdomen.  ? Nausea.  ? Diarrhea.  · Liver viruses (hepatitis):  ? Loss of appetite.  ? Tiredness.  ? Skin or the white parts of your eyes turning yellow (jaundice).  · Brain and spinal cord viruses:  ? Fever.  ? Headache.  ? Stiff neck.  ? Nausea and vomiting.  ? Confusion or sleepiness.  · Skin viruses:  ? Warts.  ? Itching.  ? Rash.  · Sexually transmitted viruses:  ? Discharge.  ? Swelling.  ? Redness.  ? Rash.  How is this diagnosed?  This condition may be diagnosed based on one or more of the following:  · Symptoms.  · Medical history.  · Physical exam.  · Blood test, sample of mucus from your lungs (sputum sample), stool sample, or a swab of body fluids or a skin sore (lesion).  How is this treated?  Viruses can be hard to treat because they live within cells. Antibiotic medicines do not treat viruses because these medicines do not get inside cells. Treatment for a viral illness may include:  · Resting and drinking plenty of fluids.  · Medicines to relieve symptoms. These can include over-the-counter medicine for pain and fever, medicines for cough or congestion, and medicines to relieve diarrhea.  · Antiviral medicines. These medicines are available only for certain types of viruses.  Some viral illnesses can be prevented with vaccinations. A common example is the flu shot.  Follow these instructions at home:  Medicines  · Take over-the-counter and prescription  medicines only as told by your health care provider.  · If you were prescribed an antiviral medicine, take it as told by your health care provider. Do not stop taking the antiviral even if you start to feel better.  · Be aware of when antibiotics are needed and when they are not needed. Antibiotics do not treat viruses. You may get an antibiotic if your health care provider thinks that you may have, or are at risk for, a bacterial infection and you have a viral infection.  ? Do not ask for an antibiotic prescription if you have been diagnosed with a viral illness. Antibiotics will not make your illness go away faster.  ? Frequently taking antibiotics when they are not needed can lead to antibiotic resistance. When this develops, the medicine no longer works against the bacteria that it normally fights.  General instructions    · Drink enough fluids to keep your urine pale yellow.  · Rest as much as possible.  · Return to your normal activities as told by your health care provider. Ask your health care provider what activities are safe for you.  · Keep all follow-up visits as told by your health care provider. This is important.    How is this prevented?  To reduce your risk of viral illness:  · Wash your hands often with soap and water for at least 20 seconds. If soap and water are not available, use hand .  · Avoid touching your nose, eyes, and mouth, especially if you have not washed your hands recently.  · If anyone in your household has a viral infection, clean all household surfaces that may have been in contact with the virus. Use soap and hot water. You may also use bleach that you have added water to (diluted).  · Stay away from people who are sick with symptoms of a viral infection.  · Do not share items such as toothbrushes and water bottles with other people.  · Keep your vaccinations up to date. This includes getting a yearly flu shot.  · Eat a healthy diet and get plenty of rest.  Contact a  health care provider if:  · You have symptoms of a viral illness that do not go away.  · Your symptoms come back after going away.  · Your symptoms get worse.  Get help right away if you have:  · Trouble breathing.  · A severe headache or a stiff neck.  · Severe vomiting or pain in your abdomen.  These symptoms may represent a serious problem that is an emergency. Do not wait to see if the symptoms will go away. Get medical help right away. Call your local emergency services (911 in the [place] Do not drive yourself to the hospital.  Summary  · Viruses are types of germs that can get into a person's body and cause illness. Viral illnesses can range from mild to severe. They can affect various parts of the body.  · Viruses can be hard to treat. There are medicines to relieve symptoms, and there are some antiviral medicines.  · If you were prescribed an antiviral medicine, take it as told by your health care provider. Do not stop taking the antiviral even if you start to feel better.  · Contact a health care provider if you have symptoms of a viral illness that do not go away.  This information is not intended to replace advice given to you by your health care provider. Make sure you discuss any questions you have with your health care provider.  Document Revised: 05/03/2021 Document Reviewed: 10/27/2020  Elsevier Patient Education © 2021 [employer]

## 2021-12-20 NOTE — PROGRESS NOTES
Subjective   George Hunter is a 41 y.o. male.     Yesterday he started feeling stuffed up in his head and nose and has gotten worse. He has drainage in his throat and chest. He also has allergies. Denies sick contacts. He was in the ER a couple days ago for N/V/D and said they told him he has a stomach virus. Those symptoms subsided and then these URI symptoms started. He works in a senior care.       The following portions of the patient's history were reviewed and updated as appropriate: allergies, current medications, past family history, past medical history, past social history, past surgical history, and problem list.    Review of Systems   Constitutional: Positive for diaphoresis. Negative for chills and fever.   HENT: Positive for congestion, postnasal drip, rhinorrhea, sinus pressure and sore throat.    Respiratory: Positive for cough (mild, occasional) and shortness of breath (laying flat only, mild).    Gastrointestinal: Positive for diarrhea, nausea and vomiting.       Objective   Physical Exam  Constitutional:       General: He is not in acute distress.     Appearance: He is well-developed. He is not diaphoretic.   Pulmonary:      Effort: Pulmonary effort is normal.   Neurological:      Mental Status: He is alert and oriented to person, place, and time.   Psychiatric:         Behavior: Behavior normal.           Assessment/Plan   Diagnoses and all orders for this visit:    1. Viral illness (Primary)  -     COVID-19 PCR, [facility], NP SWAB IN [facility] TRANSPORT MEDIA/ORAL SWISH 24-30 HR TAT - Swab, Nasopharynx; Future                 The use of a video visit has been reviewed with the patient and verbal informed consent has been obtained. Myself and George Hunter participated in this visit. The patient is located in [place], [place]. I am located in [place], [place]. Mychart and Zoom were utilized. I spent 13 minutes in the patient's chart for this visit.

## 2021-12-21 PROCEDURE — U0004 COV-19 TEST NON-CDC HGH THRU: HCPCS | Performed by: NURSE PRACTITIONER

## 2022-01-24 ENCOUNTER — TELEPHONE (OUTPATIENT)
Dept: FAMILY MEDICINE CLINIC | Facility: CLINIC | Age: 42
End: 2022-01-24

## 2022-01-24 RX ORDER — OMEPRAZOLE 40 MG/1
CAPSULE, DELAYED RELEASE ORAL
Qty: 30 CAPSULE | Refills: 0 | Status: SHIPPED | OUTPATIENT
Start: 2022-01-24 | End: 2022-01-24 | Stop reason: SDUPTHER

## 2022-01-24 RX ORDER — OMEPRAZOLE 40 MG/1
40 CAPSULE, DELAYED RELEASE ORAL DAILY
Qty: 90 CAPSULE | Refills: 0 | Status: SHIPPED | OUTPATIENT
Start: 2022-01-24 | End: 2022-02-23

## 2022-01-24 NOTE — TELEPHONE ENCOUNTER
Caller: SOLA CHRISTOPHER    Relationship: Emergency Contact    Best call back number: [phone]     Requested Prescriptions:   Requested Prescriptions      No prescriptions requested or ordered in this encounter       omeprazole (priLOSEC)        Pharmacy where request should be sent: [address] - [phone]  - [phone] FX     Additional details provided by patient: SOLA STATED THAT PATIENT HAS BEEN OUT OF MEDICATION FOR A WEEK  Does the patient have less than a 3 day supply:  [x] Yes  [] No    Yohan RUBY Rep   01/24/22 15:14 EST

## 2022-01-30 PROCEDURE — U0004 COV-19 TEST NON-CDC HGH THRU: HCPCS | Performed by: NURSE PRACTITIONER

## 2022-02-01 ENCOUNTER — APPOINTMENT (OUTPATIENT)
Dept: GENERAL RADIOLOGY | Facility: HOSPITAL | Age: 42
End: 2022-02-01

## 2022-02-01 ENCOUNTER — HOSPITAL ENCOUNTER (EMERGENCY)
Facility: HOSPITAL | Age: 42
Discharge: HOME | End: 2022-02-01
Attending: EMERGENCY MEDICINE | Admitting: EMERGENCY MEDICINE

## 2022-02-01 VITALS
DIASTOLIC BLOOD PRESSURE: 91 MMHG | OXYGEN SATURATION: 97 % | HEART RATE: 85 BPM | HEIGHT: 73 IN | WEIGHT: 211.86 LBS | SYSTOLIC BLOOD PRESSURE: 127 MMHG | BODY MASS INDEX: 28.08 KG/M2 | RESPIRATION RATE: 19 BRPM | TEMPERATURE: 98.5 F

## 2022-02-01 DIAGNOSIS — K59.00 CONSTIPATION, UNSPECIFIED CONSTIPATION TYPE: Primary | ICD-10-CM

## 2022-02-01 LAB
ALBUMIN SERPL-MCNC: 4.4 G/DL (ref 3.5–5.2)
ALBUMIN/GLOB SERPL: 1.6 G/DL
ALP SERPL-CCNC: 107 U/L (ref 39–117)
ALT SERPL W P-5'-P-CCNC: 34 U/L (ref 1–41)
ANION GAP SERPL CALCULATED.3IONS-SCNC: 9.2 MMOL/L (ref 5–15)
AST SERPL-CCNC: 37 U/L (ref 1–40)
BASOPHILS # BLD AUTO: 0.04 10*3/MM3 (ref 0–0.2)
BASOPHILS NFR BLD AUTO: 0.5 % (ref 0–1.5)
BILIRUB SERPL-MCNC: 0.4 MG/DL (ref 0–1.2)
BILIRUB UR QL STRIP: NEGATIVE
BUN SERPL-MCNC: 13 MG/DL (ref 6–20)
BUN/CREAT SERPL: 14.6 (ref 7–25)
CALCIUM SPEC-SCNC: 9.3 MG/DL (ref 8.6–10.5)
CHLORIDE SERPL-SCNC: 104 MMOL/L (ref 98–107)
CLARITY UR: CLEAR
CO2 SERPL-SCNC: 25.8 MMOL/L (ref 22–29)
COLOR UR: YELLOW
CREAT SERPL-MCNC: 0.89 MG/DL (ref 0.76–1.27)
DEPRECATED RDW RBC AUTO: 36.8 FL (ref 37–54)
EOSINOPHIL # BLD AUTO: 0.11 10*3/MM3 (ref 0–0.4)
EOSINOPHIL NFR BLD AUTO: 1.3 % (ref 0.3–6.2)
ERYTHROCYTE [DISTWIDTH] IN BLOOD BY AUTOMATED COUNT: 12.1 % (ref 12.3–15.4)
GFR SERPL CREATININE-BSD FRML MDRD: 94 ML/MIN/1.73
GLOBULIN UR ELPH-MCNC: 2.7 GM/DL
GLUCOSE SERPL-MCNC: 67 MG/DL (ref 65–99)
GLUCOSE UR STRIP-MCNC: NEGATIVE MG/DL
HCT VFR BLD AUTO: 46.3 % (ref 37.5–51)
HGB BLD-MCNC: 16.7 G/DL (ref 13–17.7)
HGB UR QL STRIP.AUTO: NEGATIVE
HOLD SPECIMEN: NORMAL
HOLD SPECIMEN: NORMAL
IMM GRANULOCYTES # BLD AUTO: 0.02 10*3/MM3 (ref 0–0.05)
IMM GRANULOCYTES NFR BLD AUTO: 0.2 % (ref 0–0.5)
KETONES UR QL STRIP: NEGATIVE
LEUKOCYTE ESTERASE UR QL STRIP.AUTO: NEGATIVE
LIPASE SERPL-CCNC: 17 U/L (ref 13–60)
LYMPHOCYTES # BLD AUTO: 1.91 10*3/MM3 (ref 0.7–3.1)
LYMPHOCYTES NFR BLD AUTO: 22.7 % (ref 19.6–45.3)
MCH RBC QN AUTO: 30.2 PG (ref 26.6–33)
MCHC RBC AUTO-ENTMCNC: 36.1 G/DL (ref 31.5–35.7)
MCV RBC AUTO: 83.7 FL (ref 79–97)
MONOCYTES # BLD AUTO: 0.46 10*3/MM3 (ref 0.1–0.9)
MONOCYTES NFR BLD AUTO: 5.5 % (ref 5–12)
NEUTROPHILS NFR BLD AUTO: 5.87 10*3/MM3 (ref 1.7–7)
NEUTROPHILS NFR BLD AUTO: 69.8 % (ref 42.7–76)
NITRITE UR QL STRIP: NEGATIVE
NRBC BLD AUTO-RTO: 0 /100 WBC (ref 0–0.2)
PH UR STRIP.AUTO: 7.5 [PH] (ref 5–8)
PLATELET # BLD AUTO: 280 10*3/MM3 (ref 140–450)
PMV BLD AUTO: 9.7 FL (ref 6–12)
POTASSIUM SERPL-SCNC: 4.1 MMOL/L (ref 3.5–5.2)
PROT SERPL-MCNC: 7.1 G/DL (ref 6–8.5)
PROT UR QL STRIP: NEGATIVE
RBC # BLD AUTO: 5.53 10*6/MM3 (ref 4.14–5.8)
SODIUM SERPL-SCNC: 139 MMOL/L (ref 136–145)
SP GR UR STRIP: 1.01 (ref 1–1.03)
UROBILINOGEN UR QL STRIP: NORMAL
WBC NRBC COR # BLD: 8.41 10*3/MM3 (ref 3.4–10.8)
WHOLE BLOOD HOLD SPECIMEN: NORMAL
WHOLE BLOOD HOLD SPECIMEN: NORMAL

## 2022-02-01 PROCEDURE — 83690 ASSAY OF LIPASE: CPT

## 2022-02-01 PROCEDURE — 80053 COMPREHEN METABOLIC PANEL: CPT

## 2022-02-01 PROCEDURE — 85025 COMPLETE CBC W/AUTO DIFF WBC: CPT | Performed by: EMERGENCY MEDICINE

## 2022-02-01 PROCEDURE — 99283 EMERGENCY DEPT VISIT LOW MDM: CPT

## 2022-02-01 PROCEDURE — 36415 COLL VENOUS BLD VENIPUNCTURE: CPT

## 2022-02-01 PROCEDURE — 74018 RADEX ABDOMEN 1 VIEW: CPT

## 2022-02-01 PROCEDURE — 81003 URINALYSIS AUTO W/O SCOPE: CPT

## 2022-02-01 RX ORDER — POLYETHYLENE GLYCOL 3350 17 G/17G
17 POWDER, FOR SOLUTION ORAL DAILY
Qty: 30 EACH | Refills: 0 | Status: SHIPPED | OUTPATIENT
Start: 2022-02-01 | End: 2022-02-24

## 2022-02-01 RX ORDER — SODIUM CHLORIDE 0.9 % (FLUSH) 0.9 %
10 SYRINGE (ML) INJECTION AS NEEDED
Status: DISCONTINUED | OUTPATIENT
Start: 2022-02-01 | End: 2022-02-01 | Stop reason: HOSPADM

## 2022-02-01 RX ORDER — DICYCLOMINE HCL 20 MG
20 TABLET ORAL EVERY 6 HOURS
Qty: 20 TABLET | Refills: 0 | Status: SHIPPED | OUTPATIENT
Start: 2022-02-01 | End: 2022-02-24

## 2022-02-01 NOTE — DISCHARGE INSTRUCTIONS
Drink increased amounts of water/fluids. Return to the ER for worsening pain or any concerns. You can also use over the counter enemas per label directions.

## 2022-02-02 NOTE — ED PROVIDER NOTES
Subjective   Pt reports he developed abdominal pain after trying to have a bowel movement earlier. He did not produce stool even though it felt like he needed to go. Reports last BM was yesterday, normal for him. Pain is the most intense in LLQ.       History provided by:  Patient  Abdominal Pain  Pain location:  Generalized  Pain quality: cramping and pressure    Pain radiates to:  LLQ  Pain severity:  Moderate  Onset quality:  Sudden  Duration:  4 hours  Timing:  Constant  Progression:  Unchanged  Chronicity:  New  Context: recent illness (Covid +)    Context: not alcohol use, not awakening from sleep, not diet changes, not eating, not laxative use, not medication withdrawal, not previous surgeries, not recent sexual activity, not recent travel, not retching, not sick contacts, not suspicious food intake and not trauma    Relieved by:  Nothing  Worsened by:  Bowel movements  Ineffective treatments:  None tried  Associated symptoms: no anorexia, no belching, no chest pain, no chills, no constipation, no cough, no diarrhea, no dysuria, no fatigue, no fever, no flatus, no hematemesis, no hematochezia, no hematuria, no melena, no nausea, no shortness of breath, no sore throat and no vomiting        Review of Systems   Constitutional: Negative for chills, fatigue and fever.   HENT: Negative for congestion, ear pain and sore throat.    Eyes: Negative for pain.   Respiratory: Negative for cough, chest tightness and shortness of breath.    Cardiovascular: Negative for chest pain.   Gastrointestinal: Positive for abdominal pain. Negative for anorexia, constipation, diarrhea, flatus, hematemesis, hematochezia, melena, nausea and vomiting.   Genitourinary: Negative for dysuria, flank pain and hematuria.   Musculoskeletal: Negative for joint swelling.   Skin: Negative for pallor.   Neurological: Negative for seizures and headaches.   All other systems reviewed and are negative.      Past Medical History:   Diagnosis Date   •  Allergies    • Anxiety    • Broken bones    • Deafness    • Diabetes ([facility])    • Diabetes mellitus type I ([facility])    • Foot pain, bilateral 2018   • Fracture    • Hammertoe    • Head injury    • Hernia cerebri ([facility])    • Hypothyroidism    • Plantar fascial fibromatosis of both feet 2018   • Toe pain, right        Allergies   Allergen Reactions   • Penicillins Anaphylaxis     Childhood allergy     • Codeine Itching       Past Surgical History:   Procedure Laterality Date   • HERNIA REPAIR         Family History   Family history unknown: Yes       Social History     Socioeconomic History   • Marital status:    Tobacco Use   • Smoking status: Former Smoker   • Smokeless tobacco: Never Used   Vaping Use   • Vaping Use: Never used   Substance and Sexual Activity   • Alcohol use: Not Currently   • Drug use: Never   • Sexual activity: Defer           Objective   Physical Exam  Vitals and nursing note reviewed.   Constitutional:       General: He is not in acute distress.     Appearance: Normal appearance. He is not toxic-appearing.   HENT:      Head: Normocephalic and atraumatic.      Mouth/Throat:      Mouth: Mucous membranes are moist.   Eyes:      General: No scleral icterus.  Cardiovascular:      Rate and Rhythm: Normal rate and regular rhythm.      Pulses: Normal pulses.      Heart sounds: Normal heart sounds.   Pulmonary:      Effort: Pulmonary effort is normal. No respiratory distress.      Breath sounds: Normal breath sounds.   Abdominal:      General: Abdomen is flat.      Palpations: Abdomen is soft.      Tenderness: There is generalized abdominal tenderness.   Musculoskeletal:         General: Normal range of motion.      Cervical back: Normal range of motion and neck supple.   Skin:     General: Skin is warm and dry.   Neurological:      Mental Status: He is alert and oriented to person, place, and time. Mental status is at baseline.         Procedures           ED Course                                                  MDM  Number of Diagnoses or Management Options  Constipation, unspecified constipation type: new and requires workup  Diagnosis management comments: The patient is resting comfortably and feels better, is alert and in no distress. Repeat examination is unremarkable and benign; in particular, there's no discomfort at McBurney's point and there is no pulsatile mass. The history, exam, diagnostic testing, and current condition does not suggest acute appendicitis, bowel obstruction, acute cholecystitis, bowel perforation, major gastrointestinal bleeding, severe diverticulitis, abdominal aortic aneurysm, mesenteric ischemia, volvulus, sepsis, or other significant pathology that warrants further testing, continued ED treatment, admission, for surgical evaluation at this point. The vital signs have been stable. The patient does not have uncontrollable pain, intractable vomiting, or other significant symptoms. The patient's condition is stable and appropriate for discharge from the emergency department.       Amount and/or Complexity of Data Reviewed  Clinical lab tests: reviewed and ordered  Tests in the radiology section of CPT®: reviewed and ordered    Risk of Complications, Morbidity, and/or Mortality  Presenting problems: low  Diagnostic procedures: low  Management options: low    Patient Progress  Patient progress: stable      Final diagnoses:   Constipation, unspecified constipation type       ED Disposition  ED Disposition     ED Disposition Condition Comment    Discharge Stable           Paul Colindresun, APRN  [address] [facility] [address]  [facility] [id number] [phone]    In 3 days           Medication List      New Prescriptions    dicyclomine 20 MG tablet  Commonly known as: BENTYL  Take 1 tablet by mouth Every 6 (Six) Hours.     polyethylene glycol 17 g packet  Commonly known as: MIRALAX  Take 17 g by mouth Daily.           Where to Get Your Medications      These medications were sent to [facility]  [address] [place], [place] - [address] [facility] - [phone]  - [phone] FX  [address] [place] [address]    Phone: [phone]   · dicyclomine 20 MG tablet  · polyethylene glycol 17 g packet          Johann Nobles, APRCHELA  02/01/22 [id number]

## 2022-02-05 ENCOUNTER — TELEMEDICINE (OUTPATIENT)
Dept: FAMILY MEDICINE CLINIC | Facility: TELEHEALTH | Age: 42
End: 2022-02-05

## 2022-02-05 DIAGNOSIS — J34.89 STUFFY AND RUNNY NOSE: ICD-10-CM

## 2022-02-05 DIAGNOSIS — U07.1 COVID-19: Primary | ICD-10-CM

## 2022-02-05 PROCEDURE — 99213 OFFICE O/P EST LOW 20 MIN: CPT | Performed by: NURSE PRACTITIONER

## 2022-02-05 NOTE — PATIENT INSTRUCTIONS
10 Things You Can Do to Manage Your COVID-19 Symptoms at Home  If you have possible or confirmed COVID-19:  1. Stay home except to get medical care.  2. Monitor your symptoms carefully. If your symptoms get worse, call your healthcare provider immediately.  3. Get rest and stay hydrated.  4. If you have a medical appointment, call the healthcare provider ahead of time and tell them that you have or may have COVID-19.  5. For medical emergencies, call 911 and notify the dispatch personnel that you have or may have COVID-19.  6. Cover your cough and sneezes with a tissue or use the inside of your elbow.  7. Wash your hands often with soap and water for at least 20 seconds or clean your hands with an alcohol-based hand  that contains at least 60% alcohol.  8. As much as possible, stay in a specific room and away from other people in your home. Also, you should use a separate bathroom, if available. If you need to be around other people in or outside of the home, wear a mask.  9. Avoid sharing personal items with other people in your household, like dishes, towels, and bedding.  10. Clean all surfaces that are touched often, like counters, tabletops, and doorknobs. Use household cleaning sprays or wipes according to the label instructions.  cdc.gov/coronavirus  07/16/2021  This information is not intended to replace advice given to you by your health care provider. Make sure you discuss any questions you have with your health care provider.  Document Revised: 08/04/2021 Document Reviewed: 08/04/2021  [employer] Patient Education © 2021 [employer]    How to Quarantine at Home  Information for Patients and Families    These instructions are for people with confirmed or suspected COVID-19 who do not need to be hospitalized and those with confirmed COVID-19 who were hospitalized and discharged to care for themselves at home.    If you were tested through the Health Department  The Health Department will monitor  your wellbeing.  If it is determined that you do not need to be hospitalized and can be isolated at home, you will be monitored by staff from your local or state health department.     If you were tested through a Commercial Lab  You will need to monitor yourself and report changes in your symptoms to your doctor.  See the section below called Monitor Your Symptoms.    Follow these steps until a healthcare provider or local or state health department says you can return to your normal activities.    Stay home except to get medical care  • Restrict activities outside your home, except for getting medical care.   • Do not go to work, school, or public areas.   • Avoid using public transportation, ride-sharing, or taxis.    Separate yourself from other people and animals in your home  People  As much as possible, you should stay in a specific room and away from other people in your home. Also, you should use a separate bathroom, if available.    Animals  You should restrict contact with pets and other animals while you are sick with COVID-19, just like you would around other people. When possible, have another member of your household care for your animals while you are sick. If you are sick with COVID-19, avoid contact with your pet, including petting, snuggling, being kissed or licked, and sharing food. If you must care for your pet or be around animals while you are sick, wash your hands before and after you interact with pets and wear a facemask. See COVID-19 and Animals for more information.    Call ahead before visiting your doctor  If you have a medical appointment, call the healthcare provider and tell them that you have or may have COVID-19. This information will help the healthcare provider’s office take steps to keep other people from getting infected or exposed.    Wear a facemask  You should wear a facemask when you are around other people (e.g., sharing a room or vehicle) or pets and before you enter a  healthcare provider’s office.     If you are not able to wear a facemask (for example, because it causes trouble breathing), then people who live with you should not stay in the same room with you, or they should wear a facemask if they enter your room.    Cover your coughs and sneezes  • Cover your mouth and nose with a tissue when you cough or sneeze.   • Throw used tissues in a lined trash can.   • Immediately wash your hands with soap and water for at least 20 seconds or, if soap and water are not available, clean your hands with an alcohol-based hand  that contains at least 60% alcohol.    Clean your hands often  • Wash your hands often with soap and water for at least 20 seconds, especially after blowing your nose, coughing, or sneezing; going to the bathroom; and before eating or preparing food.     • If soap and water are not readily available, use an alcohol-based hand  with at least 60% alcohol, covering all surfaces of your hands and rubbing them together until they feel dry.    • Soap and water are the best option if hands are visibly dirty. Avoid touching your eyes, nose, and mouth with unwashed hands.    Avoid sharing personal household items  • You should not share dishes, drinking glasses, cups, eating utensils, towels, or bedding with other people or pets in your home.   • After using these items, they should be washed thoroughly with soap and water.    Clean all “high-touch” surfaces everyday  • High touch surfaces include counters, tabletops, doorknobs, bathroom fixtures, toilets, phones, keyboards, tablets, and bedside tables.   • Also, clean any surfaces that may have blood, stool, or body fluids on them.   • Use a household cleaning spray or wipe, according to the label instructions. Labels contain instructions for safe and effective use of the cleaning product, including precautions you should take when applying the product, such as wearing gloves and making sure you have  good ventilation during use of the product.    Monitor your symptoms  • Seek prompt medical attention if your illness is worsening (e.g., difficulty breathing).   • Before seeking care, call your healthcare provider and tell them that you have, or are being evaluated for, COVID-19.   • Put on a facemask before you enter the facility.     • These steps will help the healthcare provider’s office to keep other people in the office or waiting room from getting infected or exposed.   • Persons who are placed under active monitoring or facilitated self-monitoring should follow instructions provided by their local health department or occupational health professionals, as appropriate.  • If you have a medical emergency and need to call 911, notify the dispatch personnel that you have, or are being evaluated for COVID-19. If possible, put on a facemask before emergency medical services arrive.    Discontinuing home isolation  Patients with confirmed COVID-19 should remain under home isolation precautions until the risk of secondary transmission to others is thought to be low. The decision to discontinue home isolation precautions should be made on a case-by-case basis, in consultation with healthcare providers and state and local health departments.    The below content are for household members, intimate partners, and caregivers of a patient with symptomatic laboratory-confirmed COVID-19 or a patient under investigation:    Household members, intimate partners, and caregivers may have close contact with a person with symptomatic, laboratory-confirmed COVID-19 or a person under investigation.     Close contacts should monitor their health; they should call their healthcare provider right away if they develop symptoms suggestive of COVID-19 (e.g., fever, cough, shortness of breath)     Close contacts should also follow these recommendations:  • Make sure that you understand and can help the patient follow their healthcare  provider’s instructions for medication(s) and care. You should help the patient with basic needs in the home and provide support for getting groceries, prescriptions, and other personal needs.  • Monitor the patient’s symptoms. If the patient is getting sicker, call his or her healthcare provider and tell them that the patient has laboratory-confirmed COVID-19. This will help the healthcare provider’s office take steps to keep other people in the office or waiting room from getting infected. Ask the healthcare provider to call the local or [facility] health department for additional guidance. If the patient has a medical emergency and you need to call 911, notify the dispatch personnel that the patient has, or is being evaluated for COVID-19.  • Household members should stay in another room or be  from the patient as much as possible. Household members should use a separate bedroom and bathroom, if available.  • Prohibit visitors who do not have an essential need to be in the home.  • Household members should care for any pets in the home. Do not handle pets or other animals while sick.  For more information, see COVID-19 and Animals.  • Make sure that shared spaces in the home have good air flow, such as by an air conditioner or an opened window, weather permitting.  • Perform hand hygiene frequently. Wash your hands often with soap and water for at least 20 seconds or use an alcohol-based hand  that contains 60 to 95% alcohol, covering all surfaces of your hands and rubbing them together until they feel dry. Soap and water should be used preferentially if hands are visibly dirty.  • Avoid touching your eyes, nose, and mouth with unwashed hands.  • The patient should wear a facemask when you are around other people. If the patient is not able to wear a facemask (for example, because it causes trouble breathing), you, as the caregiver, should wear a mask when you are in the same room as the  patient.  • Wear a disposable facemask and gloves when you touch or have contact with the patient’s blood, stool, or body fluids, such as saliva, sputum, nasal mucus, vomit, or urine.   o Throw out disposable facemasks and gloves after using them. Do not reuse.  o When removing personal protective equipment, first remove and dispose of gloves. Then, immediately clean your hands with soap and water or alcohol-based hand . Next, remove and dispose of facemask, and immediately clean your hands again with soap and water or alcohol-based hand .  • Avoid sharing household items with the patient. You should not share dishes, drinking glasses, cups, eating utensils, towels, bedding, or other items. After the patient uses these items, you should wash them thoroughly (see below “Wash laundry thoroughly”).  • Clean all “high-touch” surfaces, such as counters, tabletops, doorknobs, bathroom fixtures, toilets, phones, keyboards, tablets, and bedside tables, every day. Also, clean any surfaces that may have blood, stool, or body fluids on them.   o Use a household cleaning spray or wipe, according to the label instructions. Labels contain instructions for safe and effective use of the cleaning product including precautions you should take when applying the product, such as wearing gloves and making sure you have good ventilation during use of the product.  • Wash laundry thoroughly.   o Immediately remove and wash clothes or bedding that have blood, stool, or body fluids on them.  o Wear disposable gloves while handling soiled items and keep soiled items away from your body. Clean your hands (with soap and water or an alcohol-based hand ) immediately after removing your gloves.  o Read and follow directions on labels of laundry or clothing items and detergent. In general, using a normal laundry detergent according to washing machine instructions and dry thoroughly using the warmest temperatures  recommended on the clothing label.  • Place all used disposable gloves, facemasks, and other contaminated items in a lined container before disposing of them with other household waste. Clean your hands (with soap and water or an alcohol-based hand ) immediately after handling these items. Soap and water should be used preferentially if hands are visibly dirty.  • Discuss any additional questions with your state or local health department or healthcare provider.    Adapted from information provided by the Centers for Disease Control and Prevention.  For more information, visit https://www.cdc.gov/coronavirus/2019-ncov/hcp/guidance-prevent-spread.html

## 2022-02-05 NOTE — PROGRESS NOTES
You have chosen to receive care through a telehealth visit.  Do you consent to use a video/audio connection for your medical care today? Yes     CHIEF COMPLAINT  Chief Complaint   Patient presents with   • Covid-19 Home Monitoring Video Visit     question regarding Quarantine         HPI  George Hunter is a 41 y.o. male  presents with concern as to when he can return to work. He tested + for Covid last Sunday when he Covid symptoms began.. He is without fever and only has a runny nose.     Review of Systems   Constitutional: Negative.    HENT: Positive for postnasal drip and rhinorrhea.    Eyes: Negative.    Respiratory: Negative.    Cardiovascular: Negative.    Gastrointestinal: Negative.    Musculoskeletal: Negative.    Neurological: Negative.    Psychiatric/Behavioral: Negative.        Past Medical History:   Diagnosis Date   • Allergies    • Anxiety    • Broken bones    • Deafness    • Diabetes (HCC)    • Diabetes mellitus type I (HCC)    • Foot pain, bilateral 2018   • Fracture    • Hammertoe    • Head injury    • Hernia cerebri (HCC)    • Hypothyroidism    • Plantar fascial fibromatosis of both feet 2018   • Toe pain, right        Family History   Family history unknown: Yes       Social History     Socioeconomic History   • Marital status:    Tobacco Use   • Smoking status: Former Smoker   • Smokeless tobacco: Never Used   Vaping Use   • Vaping Use: Never used   Substance and Sexual Activity   • Alcohol use: Not Currently   • Drug use: Never   • Sexual activity: Defer         There were no vitals taken for this visit.    PHYSICAL EXAM  Physical Exam   Constitutional: He is oriented to person, place, and time. He appears well-developed and well-nourished. He does not have a sickly appearance. He does not appear ill. No distress.   HENT:   Head: Normocephalic and atraumatic.   Right Ear: Hearing normal.   Left Ear: Hearing normal.   Nose: Rhinorrhea and congestion present.   Pulmonary/Chest: Effort  normal.  No respiratory distress.  Neurological: He is alert and oriented to person, place, and time.   Psychiatric: He has a normal mood and affect.   Vitals reviewed.      Results for orders placed or performed during the hospital encounter of 02/01/22   Comprehensive Metabolic Panel    Specimen: Blood   Result Value Ref Range    Glucose 67 65 - 99 mg/dL    BUN 13 6 - 20 mg/dL    Creatinine 0.89 0.76 - 1.27 mg/dL    Sodium 139 136 - 145 mmol/L    Potassium 4.1 3.5 - 5.2 mmol/L    Chloride 104 98 - 107 mmol/L    CO2 25.8 22.0 - 29.0 mmol/L    Calcium 9.3 8.6 - 10.5 mg/dL    Total Protein 7.1 6.0 - 8.5 g/dL    Albumin 4.40 3.50 - 5.20 g/dL    ALT (SGPT) 34 1 - 41 U/L    AST (SGOT) 37 1 - 40 U/L    Alkaline Phosphatase 107 39 - 117 U/L    Total Bilirubin 0.4 0.0 - 1.2 mg/dL    eGFR Non African Amer 94 >60 mL/min/1.73    Globulin 2.7 gm/dL    A/G Ratio 1.6 g/dL    BUN/Creatinine Ratio 14.6 7.0 - 25.0    Anion Gap 9.2 5.0 - 15.0 mmol/L   Lipase    Specimen: Blood   Result Value Ref Range    Lipase 17 13 - 60 U/L   Urinalysis With Microscopic If Indicated (No Culture) - Urine, Clean Catch    Specimen: Urine, Clean Catch   Result Value Ref Range    Color, UA Yellow Yellow, Straw    Appearance, UA Clear Clear    pH, UA 7.5 5.0 - 8.0    Specific Gravity, UA 1.011 1.005 - 1.030    Glucose, UA Negative Negative    Ketones, UA Negative Negative    Bilirubin, UA Negative Negative    Blood, UA Negative Negative    Protein, UA Negative Negative    Leuk Esterase, UA Negative Negative    Nitrite, UA Negative Negative    Urobilinogen, UA 1.0 E.U./dL 0.2 - 1.0 E.U./dL   CBC Auto Differential    Specimen: Blood   Result Value Ref Range    WBC 8.41 3.40 - 10.80 10*3/mm3    RBC 5.53 4.14 - 5.80 10*6/mm3    Hemoglobin 16.7 13.0 - 17.7 g/dL    Hematocrit 46.3 37.5 - 51.0 %    MCV 83.7 79.0 - 97.0 fL    MCH 30.2 26.6 - 33.0 pg    MCHC 36.1 (H) 31.5 - 35.7 g/dL    RDW 12.1 (L) 12.3 - 15.4 %    RDW-SD 36.8 (L) 37.0 - 54.0 fl    MPV 9.7 6.0  - 12.0 fL    Platelets 280 140 - 450 10*3/mm3    Neutrophil % 69.8 42.7 - 76.0 %    Lymphocyte % 22.7 19.6 - 45.3 %    Monocyte % 5.5 5.0 - 12.0 %    Eosinophil % 1.3 0.3 - 6.2 %    Basophil % 0.5 0.0 - 1.5 %    Immature Grans % 0.2 0.0 - 0.5 %    Neutrophils, Absolute 5.87 1.70 - 7.00 10*3/mm3    Lymphocytes, Absolute 1.91 0.70 - 3.10 10*3/mm3    Monocytes, Absolute 0.46 0.10 - 0.90 10*3/mm3    Eosinophils, Absolute 0.11 0.00 - 0.40 10*3/mm3    Basophils, Absolute 0.04 0.00 - 0.20 10*3/mm3    Immature Grans, Absolute 0.02 0.00 - 0.05 10*3/mm3    nRBC 0.0 0.0 - 0.2 /100 WBC   Green Top (Gel)   Result Value Ref Range    Extra Tube Hold for add-ons.    Lavender Top   Result Value Ref Range    Extra Tube hold for add-on    Gold Top - SST   Result Value Ref Range    Extra Tube Hold for add-ons.    Light Blue Top   Result Value Ref Range    Extra Tube hold for add-on        Diagnoses and all orders for this visit:    1. COVID-19 (Primary)  -     QUESTIONNAIRE SERIES    2. Stuffy and runny nose    Rest and fluids.   Quarantine as directed.   You have tested positive for Covid-19. Please quarantine for 10 days since symptoms first appeared. If symptoms fully resolve, isolation may be shortened and end after day 5 on the first day without symptoms. Wear a well-fitting face mask for 10 full days since the start of symptoms. Isolation should not be shortened if a mask cannot be worn properly and consistently. 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      FOLLOW-UP  As discussed during visit with PCP/[facility], [facility] if no improvement or Urgent Care/Emergency Department if worsening of symptoms    Patient verbalizes understanding of medication dosage, comfort measures, instructions for treatment and follow-up.    Patsy Murrell, APRN  02/05/2022  12:00 EST    This visit was performed via Telehealth.  This patient has been instructed to follow-up with  their primary care provider if their symptoms worsen or the treatment provided does not resolve their illness.

## 2022-02-18 ENCOUNTER — APPOINTMENT (OUTPATIENT)
Dept: DIABETES SERVICES | Facility: HOSPITAL | Age: 42
End: 2022-02-18

## 2022-02-23 RX ORDER — OMEPRAZOLE 40 MG/1
CAPSULE, DELAYED RELEASE ORAL
Qty: 30 CAPSULE | Refills: 0 | Status: SHIPPED | OUTPATIENT
Start: 2022-02-23 | End: 2022-03-28

## 2022-02-24 ENCOUNTER — OFFICE VISIT (OUTPATIENT)
Dept: PODIATRY | Facility: CLINIC | Age: 42
End: 2022-02-24

## 2022-02-24 VITALS
HEART RATE: 72 BPM | OXYGEN SATURATION: 96 % | BODY MASS INDEX: 28.52 KG/M2 | SYSTOLIC BLOOD PRESSURE: 116 MMHG | HEIGHT: 73 IN | DIASTOLIC BLOOD PRESSURE: 84 MMHG | WEIGHT: 215.2 LBS | TEMPERATURE: 97.1 F

## 2022-02-24 DIAGNOSIS — E11.9 TYPE 2 DIABETES MELLITUS WITHOUT COMPLICATION, WITH LONG-TERM CURRENT USE OF INSULIN: ICD-10-CM

## 2022-02-24 DIAGNOSIS — L60.0 ONYCHOCRYPTOSIS: ICD-10-CM

## 2022-02-24 DIAGNOSIS — Z79.4 TYPE 2 DIABETES MELLITUS WITHOUT COMPLICATION, WITH LONG-TERM CURRENT USE OF INSULIN: ICD-10-CM

## 2022-02-24 DIAGNOSIS — M79.671 FOOT PAIN, RIGHT: Primary | ICD-10-CM

## 2022-02-24 PROCEDURE — 99213 OFFICE O/P EST LOW 20 MIN: CPT | Performed by: PODIATRIST

## 2022-02-24 NOTE — PROGRESS NOTES
[facility] - PODIATRY    Today's Date: 02/24/22    Patient Name: George Hunter  MRN: [id number]  CSN: [id number]  PCP: Jak Colindres APRN, Last PCP Visit: 24 January 2022  Referring Provider: No ref. provider found    SUBJECTIVE     Chief Complaint   Patient presents with   • Right Foot - Pain     Great toe      HPI: George Hunter, a 41 y.o.male, presents to clinic.    New, Established, New Problem: New problem    Location: Pain on distal aspect of right first toenail along the leading edge right the nail is growing out status post total nail avulsion last year.    Duration: Early 2022    Onset: Insidious as new nail is regrowing    Nature: Tender, sore    Stable, worsening, improving: Worsening    Aggravating factors:  Patient relates pain is aggravated by shoe gear and ambulation.      Previous Treatment: Previous nail avulsion    Patient denies any fevers, chills, nausea, vomiting, shortness of breath, nor any other constitutional signs nor symptoms.     Medical changes: Recent COVID-19 infection which did not require hospitalization.    Patient states their most recent blood glucose reading was 94.    Past Medical History:   Diagnosis Date   • Allergies    • Anxiety    • Broken bones    • Deafness    • Diabetes ([facility])    • Diabetes mellitus type I ([facility])    • Foot pain, bilateral 2018   • Fracture    • Hammertoe    • Head injury    • Hernia cerebri ([facility])    • Hypothyroidism    • Neuropathy in diabetes (HCC) 2009   • Plantar fascial fibromatosis of both feet 2018   • Toe pain, right      Past Surgical History:   Procedure Laterality Date   • HERNIA REPAIR       Family History   Family history unknown: Yes     Social History     Socioeconomic History   • Marital status:    Tobacco Use   • Smoking status: Former Smoker     Packs/day: 0.50     Years: 10.00     Pack years: 5.00     Types: Cigarettes, Cigars   • Smokeless tobacco: Never Used   Vaping Use   • Vaping Use: Never used   Substance and  Sexual Activity   • Alcohol use: Not Currently     Alcohol/week: 0.0 standard drinks   • Drug use: Never   • Sexual activity: Yes     Partners: Female     Allergies   Allergen Reactions   • Penicillins Anaphylaxis     Childhood allergy     • Codeine Itching     Current Outpatient Medications   Medication Sig Dispense Refill   • acetaminophen (TYLENOL) 500 MG tablet Take 1 tablet by mouth Every 6 (Six) Hours As Needed for Mild Pain . 30 tablet 0   • cetirizine-pseudoephedrine (ZyrTEC-D) 5-120 MG per 12 hr tablet Take 1 tablet by mouth 2 (Two) Times a Day. 24 tablet 0   • Euthyrox 25 MCG tablet Take 25 mcg by mouth Daily.     • insulin aspart (NovoLOG FlexPen) 100 UNIT/ML solution pen-injector sc pen Inject 30 Units under the skin into the appropriate area as directed 3 (Three) Times a Day With Meals. 9 pen 5   • Insulin Glargine (BASAGLAR KWIKPEN) 100 UNIT/ML injection pen Inject 40 Units under the skin into the appropriate area as directed Daily. 4 pen 5   • lisinopril (Zestril) 2.5 MG tablet Take 1 tablet by mouth Daily. 30 tablet 3   • omeprazole (priLOSEC) 40 MG capsule TAKE 1 CAPSULE BY MOUTH ONCE DAILY BEFORE A MEAL 30 capsule 0   • simvastatin (ZOCOR) 10 MG tablet Take 1 tablet by mouth Every Night. 30 tablet 3     No current facility-administered medications for this visit.     Review of Systems   Constitutional: Negative.    Skin:        Painful nail on leading edge regrowth on right great toenail   All other systems reviewed and are negative.      OBJECTIVE     Vitals:    02/24/22 0833   BP: 116/84   Pulse: 72   Temp: 97.1 °F (36.2 °C)   SpO2: 96%       WBC   Date Value Ref Range Status   02/01/2022 8.41 3.40 - 10.80 10*3/mm3 Final     RBC   Date Value Ref Range Status   02/01/2022 5.53 4.14 - 5.80 10*6/mm3 Final     Hemoglobin   Date Value Ref Range Status   02/01/2022 16.7 13.0 - 17.7 g/dL Final     Hematocrit   Date Value Ref Range Status   02/01/2022 46.3 37.5 - 51.0 % Final     MCV   Date Value Ref  Range Status   02/01/2022 83.7 79.0 - 97.0 fL Final     MCH   Date Value Ref Range Status   02/01/2022 30.2 26.6 - 33.0 pg Final     MCHC   Date Value Ref Range Status   02/01/2022 36.1 (H) 31.5 - 35.7 g/dL Final     RDW   Date Value Ref Range Status   02/01/2022 12.1 (L) 12.3 - 15.4 % Final     RDW-SD   Date Value Ref Range Status   02/01/2022 36.8 (L) 37.0 - 54.0 fl Final     MPV   Date Value Ref Range Status   02/01/2022 9.7 6.0 - 12.0 fL Final     Platelets   Date Value Ref Range Status   02/01/2022 280 140 - 450 10*3/mm3 Final     Neutrophil %   Date Value Ref Range Status   02/01/2022 69.8 42.7 - 76.0 % Final     Lymphocyte %   Date Value Ref Range Status   02/01/2022 22.7 19.6 - 45.3 % Final     Monocyte %   Date Value Ref Range Status   02/01/2022 5.5 5.0 - 12.0 % Final     Eosinophil %   Date Value Ref Range Status   02/01/2022 1.3 0.3 - 6.2 % Final     Basophil %   Date Value Ref Range Status   02/01/2022 0.5 0.0 - 1.5 % Final     Immature Grans %   Date Value Ref Range Status   02/01/2022 0.2 0.0 - 0.5 % Final     Neutrophils, Absolute   Date Value Ref Range Status   02/01/2022 5.87 1.70 - 7.00 10*3/mm3 Final     Lymphocytes, Absolute   Date Value Ref Range Status   02/01/2022 1.91 0.70 - 3.10 10*3/mm3 Final     Monocytes, Absolute   Date Value Ref Range Status   02/01/2022 0.46 0.10 - 0.90 10*3/mm3 Final     Eosinophils, Absolute   Date Value Ref Range Status   02/01/2022 0.11 0.00 - 0.40 10*3/mm3 Final     Basophils, Absolute   Date Value Ref Range Status   02/01/2022 0.04 0.00 - 0.20 10*3/mm3 Final     Immature Grans, Absolute   Date Value Ref Range Status   02/01/2022 0.02 0.00 - 0.05 10*3/mm3 Final     nRBC   Date Value Ref Range Status   02/01/2022 0.0 0.0 - 0.2 /100 WBC Final         Lab Results   Component Value Date    GLUCOSE 67 02/01/2022    BUN 13 02/01/2022    CREATININE 0.89 02/01/2022    EGFRIFNONA 94 02/01/2022    BCR 14.6 02/01/2022    K 4.1 02/01/2022    CO2 25.8 02/01/2022    CALCIUM  9.3 02/01/2022    ALBUMIN 4.40 02/01/2022    LABIL2 1.4 05/20/2021    AST 37 02/01/2022    ALT 34 02/01/2022       Patient seen in no apparent distress.      PHYSICAL EXAM:     Foot/Ankle Exam:       General:   Appearance: appears stated age and healthy    Orientation: AAOx3    Affect: appropriate    Gait: unimpaired    Shoe Gear:  Casual shoes    VASCULAR      Right Foot Vascularity   Normal vascular exam    Dorsalis pedis:  2+  Posterior tibial:  2+  Skin Temperature: warm    Edema Grading:  None  CFT:  < 3 seconds  Pedal Hair Growth:  Present  Varicosities: none       Left Foot Vascularity   Normal vascular exam    Dorsalis pedis:  2+  Posterior tibial:  2+  Skin Temperature: warm    Edema Grading:  None  CFT:  < 3 seconds  Pedal Hair Growth:  Present  Varicosities: none        NEUROLOGIC     Right Foot Neurologic   Normal sensation    Light touch sensation:  Normal  Vibratory sensation:  Normal  Hot/Cold sensation: normal    Protective Sensation using Stonewall-Cheryl Monofilament:  10     Left Foot Neurologic   Normal sensation    Light touch sensation:  Normal  Vibratory sensation:  Normal  Hot/cold sensation: normal    Protective Sensation using Stonewall-Cheryl Monofilament:  10     MUSCLE STRENGTH     Right Foot Muscle Strength   Normal strength    Foot dorsiflexion:  5  Foot plantar flexion:  5  Foot inversion:  5  Foot eversion:  5     Left Foot Muscle Strength   Foot dorsiflexion:  5  Foot plantar flexion:  5  Foot inversion:  5  Foot eversion:  5     RANGE OF MOTION      Right Foot Range of Motion   Foot and ankle ROM within normal limits       Left Foot Range of Motion   Foot and ankle ROM within normal limits       DERMATOLOGIC     Right Foot Dermatologic   Skin: skin intact    Nails: ingrown toenail and paronychia       Left Foot Dermatologic   Skin: skin intact    Nails: normal        Right Foot Additional Comments Slant back debridement - This procedure is indicated for onychocryptosis.  Indications, risks and benefits and alternative treatments have been discussed with this patient who has agreed to this procedure. The area was prepped with isopropyl alcohol solution.  The offending nailborder was excised via slant back debridement with nail nippers nippers to the leading edge of the right great toenail.  A sterile dressing was applied. The patient tolerated the procedure well.        ASSESSMENT/PLAN     Diagnoses and all orders for this visit:    1. Foot pain, right (Primary)    2. Onychocryptosis    3. Type 2 diabetes mellitus without complication, with long-term current use of insulin (HCC)        Comprehensive lower extremity examination and evaluation was performed.    Discussed findings and treatment plan including risks, benefits, and treatment options with patient in detail. Patient agreed with treatment plan.    Medications and allergies reviewed.  Reviewed available lab values along with other pertinent labs.  These were discussed with the patient.    Patient is to monitor for recurrence and any new symptoms and to contact Dr. Buitrago's office for a follow-up appointment.      The patient states understanding and agreement with this plan.    An After Visit Summary was printed and given to the patient at discharge, including (if requested) any available informative/educational handouts regarding diagnosis, treatment, or medications. All questions were answered to patient/family satisfaction. Should symptoms fail to improve or worsen they agree to call or return to clinic or to go to the Emergency Department. Discussed the importance of following up with any needed screening tests/labs/specialist appointments and any requested follow-up recommended by me today. Importance of maintaining follow-up discussed and patient accepts that missed appointments can delay diagnosis and potentially lead to worsening of conditions.    Return in about 33 weeks (around 10/13/2022) for Podiatry Diabetic  Foot Exam; already scheduled., or sooner if acute issues arise.    This document has been electronically signed by Jayy Buitrago DPM on February 24, 2022 09:04 EST

## 2022-03-28 RX ORDER — OMEPRAZOLE 40 MG/1
CAPSULE, DELAYED RELEASE ORAL
Qty: 30 CAPSULE | Refills: 0 | Status: SHIPPED | OUTPATIENT
Start: 2022-03-28 | End: 2022-04-27

## 2022-03-31 ENCOUNTER — OFFICE VISIT (OUTPATIENT)
Dept: DIABETES SERVICES | Facility: HOSPITAL | Age: 42
End: 2022-03-31

## 2022-03-31 VITALS
OXYGEN SATURATION: 96 % | HEIGHT: 73 IN | DIASTOLIC BLOOD PRESSURE: 66 MMHG | BODY MASS INDEX: 28.08 KG/M2 | HEART RATE: 78 BPM | TEMPERATURE: 96.8 F | SYSTOLIC BLOOD PRESSURE: 123 MMHG | WEIGHT: 211.86 LBS

## 2022-03-31 DIAGNOSIS — E10.65 UNCONTROLLED TYPE 1 DIABETES MELLITUS WITH HYPERGLYCEMIA: Primary | ICD-10-CM

## 2022-03-31 LAB
EXPIRATION DATE: NORMAL
GLUCOSE BLDC GLUCOMTR-MCNC: 136 MG/DL (ref 70–99)
HBA1C MFR BLD: 8 %
Lab: NORMAL

## 2022-03-31 PROCEDURE — 99213 OFFICE O/P EST LOW 20 MIN: CPT | Performed by: NURSE PRACTITIONER

## 2022-03-31 PROCEDURE — 83036 HEMOGLOBIN GLYCOSYLATED A1C: CPT | Performed by: NURSE PRACTITIONER

## 2022-03-31 PROCEDURE — G0463 HOSPITAL OUTPT CLINIC VISIT: HCPCS | Performed by: NURSE PRACTITIONER

## 2022-03-31 PROCEDURE — 82962 GLUCOSE BLOOD TEST: CPT | Performed by: NURSE PRACTITIONER

## 2022-04-13 ENCOUNTER — TELEMEDICINE (OUTPATIENT)
Dept: FAMILY MEDICINE CLINIC | Facility: TELEHEALTH | Age: 42
End: 2022-04-13

## 2022-04-13 DIAGNOSIS — J40 BRONCHITIS: Primary | ICD-10-CM

## 2022-04-13 PROCEDURE — 99213 OFFICE O/P EST LOW 20 MIN: CPT | Performed by: NURSE PRACTITIONER

## 2022-04-13 RX ORDER — PREDNISONE 20 MG/1
20 TABLET ORAL 2 TIMES DAILY
Qty: 10 TABLET | Refills: 0 | Status: SHIPPED | OUTPATIENT
Start: 2022-04-13 | End: 2022-04-18

## 2022-04-13 RX ORDER — ALBUTEROL SULFATE 90 UG/1
2 AEROSOL, METERED RESPIRATORY (INHALATION) EVERY 4 HOURS PRN
Qty: 18 G | Refills: 0 | Status: SHIPPED | OUTPATIENT
Start: 2022-04-13 | End: 2022-05-31

## 2022-04-13 RX ORDER — DEXTROMETHORPHAN HYDROBROMIDE AND PROMETHAZINE HYDROCHLORIDE 15; 6.25 MG/5ML; MG/5ML
5 SYRUP ORAL 4 TIMES DAILY PRN
Qty: 100 ML | Refills: 0 | Status: SHIPPED | OUTPATIENT
Start: 2022-04-13 | End: 2022-08-16

## 2022-04-13 NOTE — PATIENT INSTRUCTIONS
Acute Bronchitis, Adult    Acute bronchitis is sudden or acute swelling of the air tubes (bronchi) in the lungs. Acute bronchitis causes these tubes to fill with mucus, which can make it hard to breathe. It can also cause coughing or wheezing.  In adults, acute bronchitis usually goes away within 2 weeks. A cough caused by bronchitis may last up to 3 weeks. Smoking, allergies, and asthma can make the condition worse.  What are the causes?  This condition can be caused by germs and by substances that irritate the lungs, including:  Cold and flu viruses. The most common cause of this condition is the virus that causes the common cold.  Bacteria.  Substances that irritate the lungs, including:  Smoke from cigarettes and other forms of tobacco.  Dust and pollen.  Fumes from chemical products, gases, or burned fuel.  Other materials that pollute indoor or outdoor air.  Close contact with someone who has acute bronchitis.  What increases the risk?  The following factors may make you more likely to develop this condition:  A weak body's defense system, also called the immune system.  A condition that affects your lungs and breathing, such as asthma.  What are the signs or symptoms?  Common symptoms of this condition include:  Lung and breathing problems, such as:  Coughing. This may bring up clear, yellow, or green mucus from your lungs (sputum).  Wheezing.  Having too much mucus in your lungs (chest congestion).  Having shortness of breath.  A fever.  Chills.  Aches and pains, including:  Tightness in your chest and other body aches.  A sore throat.  How is this diagnosed?  This condition is usually diagnosed based on:  Your symptoms and medical history.  A physical exam.  You may also have other tests, including tests to rule out other conditions, such pneumonia. These tests include:  A test of lung function.  Test of a mucus sample to look for the presence of bacteria.  Tests to check the oxygen level in your  blood.  Blood tests.  Chest X-ray.  How is this treated?  Most cases of acute bronchitis clear up over time without treatment. Your health care provider may recommend:  Drinking more fluids. This can thin your mucus, which may improve your breathing.  Taking a medicine for a fever or cough.  Using a device that gets medicine into your lungs (inhaler) to help improve breathing and control coughing.  Using a vaporizer or a humidifier. These are machines that add water to the air to help you breathe better.  Follow these instructions at home:  Activity  Get plenty of rest.  Return to your normal activities as told by your health care provider. Ask your health care provider what activities are safe for you.  Lifestyle  Drink enough fluid to keep your urine pale yellow.  Do not drink alcohol.  Do not use any products that contain nicotine or tobacco, such as cigarettes, e-cigarettes, and chewing tobacco. If you need help quitting, ask your health care provider. Be aware that:  Your bronchitis will get worse if you smoke or breathe in other people's smoke (secondhand smoke).  Your lungs will heal faster if you quit smoking.  General instructions    Take over-the-counter and prescription medicines only as told by your health care provider.  Use an inhaler, vaporizer, or humidifier as told by your health care provider.  If you have a sore throat, gargle with a salt-water mixture 3-4 times a day or as needed. To make a salt-water mixture, completely dissolve ½-1 tsp (3-6 g) of salt in 1 cup (237 mL) of warm water.  Keep all follow-up visits as told by your health care provider. This is important.    How is this prevented?  To lower your risk of getting this condition again:  Wash your hands often with soap and water. If soap and water are not available, use hand .  Avoid contact with people who have cold symptoms.  Try not to touch your mouth, nose, or eyes with your hands.  Avoid places where there are fumes from  chemicals. Breathing these fumes will make your condition worse.  Get the flu shot every year.  Contact a health care provider if:  Your symptoms do not improve after 2 weeks of treatment.  You vomit more than once or twice.  You have symptoms of dehydration such as:  Dark urine.  Dry skin or eyes.  Increased thirst.  Headaches.  Confusion.  Muscle cramps.  Get help right away if you:  Cough up blood.  Feel pain in your chest.  Have severe shortness of breath.  Faint or keep feeling like you are going to faint.  Have a severe headache.  Have fever or chills that get worse.  These symptoms may represent a serious problem that is an emergency. Do not wait to see if the symptoms will go away. Get medical help right away. Call your local emergency services (911 in the [place] Do not drive yourself to the hospital.  Summary  Acute bronchitis is sudden (acute) inflammation of the air tubes (bronchi) between the windpipe and the lungs. In adults, acute bronchitis usually goes away within 2 weeks, although coughing may last 3 weeks or longer  Take over-the-counter and prescription medicines only as told by your health care provider.  Drink enough fluid to keep your urine pale yellow.  Contact a health care provider if your symptoms do not improve after 2 weeks of treatment.  Get help right away if you cough up blood, faint, or have chest pain or shortness of breath.  This information is not intended to replace advice given to you by your health care provider. Make sure you discuss any questions you have with your health care provider.  Document Revised: 08/31/2020 Document Reviewed: 07/10/2020  [employer] Patient Education © 2021 [employer]

## 2022-04-13 NOTE — PROGRESS NOTES
You have chosen to receive care through a telehealth visit.  Do you consent to use a video/audio connection for your medical care today? Yes     CHIEF COMPLAINT  No chief complaint on file.        HPI  George Hunter is a 41 y.o. male  presents with complaint of 2 day history of chest congestion/tightness, sore throat, dry persistent cough, ears are itching.  Denies fever, nasal congestion, wheezing, shortness of breath.  Son is also sick with same symptoms.  Mr. Hunter is diabetic.     Review of Systems   HENT: Positive for sore throat. Negative for ear pain.    Respiratory: Positive for cough and chest tightness. Negative for shortness of breath and wheezing.    All other systems reviewed and are negative.      Past Medical History:   Diagnosis Date   • Allergies    • Anxiety    • Broken bones    • Deafness    • Diabetes ([facility])    • Diabetes mellitus type I ([facility])    • Foot pain, bilateral 2018   • Fracture    • Hammertoe    • Head injury    • Hernia cerebri ([facility])    • Hypothyroidism    • Neuropathy in diabetes ([facility]) 2009   • Plantar fascial fibromatosis of both feet 2018   • Toe pain, right        Family History   Family history unknown: Yes       Social History     Socioeconomic History   • Marital status:    Tobacco Use   • Smoking status: Former Smoker     Packs/day: 0.50     Years: 10.00     Pack years: 5.00     Types: Cigarettes, Cigars   • Smokeless tobacco: Never Used   Vaping Use   • Vaping Use: Never used   Substance and Sexual Activity   • Alcohol use: Not Currently     Alcohol/week: 0.0 standard drinks   • Drug use: Never   • Sexual activity: Yes     Partners: Female       George Hnuter  reports that he has quit smoking. His smoking use included cigarettes and cigars. He has a 5.00 pack-year smoking history. He has never used smokeless tobacco..             There were no vitals taken for this visit.    PHYSICAL EXAM  Physical Exam   Constitutional: He is oriented to person, place, and time. He  appears well-developed and well-nourished. He does not have a sickly appearance. He does not appear ill.   HENT:   Head: Normocephalic and atraumatic.   Pulmonary/Chest: Effort normal.  No respiratory distress (persistent cough during visit).  Neurological: He is alert and oriented to person, place, and time.         Diagnoses and all orders for this visit:    1. Bronchitis (Primary)  -     predniSONE (DELTASONE) 20 MG tablet; Take 1 tablet by mouth 2 (Two) Times a Day for 5 days.  Dispense: 10 tablet; Refill: 0  -     promethazine-dextromethorphan (PROMETHAZINE-DM) 6.25-15 MG/5ML syrup; Take 5 mL by mouth 4 (Four) Times a Day As Needed for Cough.  Dispense: 100 mL; Refill: 0  -     albuterol sulfate  (90 Base) MCG/ACT inhaler; Inhale 2 puffs Every 4 (Four) Hours As Needed for Wheezing.  Dispense: 18 g; Refill: 0    --take medications as prescribed  --increase fluids, rest, tylenol or ibuprofen for pain  --f/u in 3-5 days if no improvement      FOLLOW-UP  As discussed during visit with PCP/[facility] if no improvement or Urgent Care/Emergency Department if worsening of symptoms    Patient verbalizes understanding of medication dosage, comfort measures, instructions for treatment and follow-up.    MALCOM Carroll  04/13/2022  10:22 EDT    This visit was performed via Telehealth.  This patient has been instructed to follow-up with their primary care provider if their symptoms worsen or the treatment provided does not resolve their illness.

## 2022-04-18 ENCOUNTER — TELEMEDICINE (OUTPATIENT)
Dept: FAMILY MEDICINE CLINIC | Facility: TELEHEALTH | Age: 42
End: 2022-04-18

## 2022-04-18 DIAGNOSIS — J06.9 UPPER RESPIRATORY TRACT INFECTION, UNSPECIFIED TYPE: Primary | ICD-10-CM

## 2022-04-18 PROCEDURE — 99213 OFFICE O/P EST LOW 20 MIN: CPT | Performed by: NURSE PRACTITIONER

## 2022-04-18 RX ORDER — AZITHROMYCIN 250 MG/1
TABLET, FILM COATED ORAL
Qty: 6 TABLET | Refills: 0 | Status: SHIPPED | OUTPATIENT
Start: 2022-04-18 | End: 2022-05-31 | Stop reason: SDUPTHER

## 2022-04-18 NOTE — PROGRESS NOTES
You have chosen to receive care through a telehealth visit.  Do you consent to use a video/audio connection for your medical care today? Yes     CHIEF COMPLAINT  Chief Complaint   Patient presents with   • Cough   • Fatigue   • Fever         HPI  George Hunter is a 41 y.o. male  presents with complaint of cough present for over 1 week. He has a low grade fever. He is taking prednisone and cough syrup for relief. He has increased congestion and his mucus is yellow. He states symptoms are not worsening and he needs an antibiotic. He states he gets tested for Covid 19 twice a week and he has been negative.     Review of Systems   Constitutional: Positive for fever.   HENT: Positive for congestion.    Respiratory: Positive for cough. Negative for shortness of breath, wheezing and stridor.    Cardiovascular: Negative.    Gastrointestinal: Negative.    Allergic/Immunologic: Negative.    Neurological: Negative.    Hematological: Negative.    Psychiatric/Behavioral: Negative.        Past Medical History:   Diagnosis Date   • Allergies    • Anxiety    • Broken bones    • Deafness    • Diabetes ([facility])    • Diabetes mellitus type I ([facility])    • Foot pain, bilateral 2018   • Fracture    • Hammertoe    • Head injury    • Hernia cerebri ([facility])    • Hypothyroidism    • Neuropathy in diabetes ([facility]) 2009   • Plantar fascial fibromatosis of both feet 2018   • Toe pain, right        Family History   Family history unknown: Yes       Social History     Socioeconomic History   • Marital status:    Tobacco Use   • Smoking status: Former Smoker     Packs/day: 0.50     Years: 10.00     Pack years: 5.00     Types: Cigarettes, Cigars   • Smokeless tobacco: Never Used   Vaping Use   • Vaping Use: Never used   Substance and Sexual Activity   • Alcohol use: Not Currently     Alcohol/week: 0.0 standard drinks   • Drug use: Never   • Sexual activity: Yes     Partners: Female       George Hunter  reports that he has quit smoking. His smoking  use included cigarettes and cigars. He has a 5.00 pack-year smoking history. He has never used smokeless tobacco..     There were no vitals taken for this visit.    PHYSICAL EXAM  Physical Exam   Constitutional: He is oriented to person, place, and time. He appears well-developed and well-nourished. He does not have a sickly appearance. He does not appear ill. No distress.   HENT:   Head: Normocephalic and atraumatic.   Right Ear: Hearing normal.   Left Ear: Hearing normal.   Nose: Nose normal.   Mouth/Throat: Mouth/Lips are normal.  Pulmonary/Chest: Effort normal.  No respiratory distress.  Neurological: He is alert and oriented to person, place, and time.   Psychiatric: He has a normal mood and affect.   Vitals reviewed.      Results for orders placed or performed in visit on 03/31/22   POC Glucose    Specimen: Blood   Result Value Ref Range    Glucose 136 (H) 70 - 99 mg/dL   POC Glycosylated Hemoglobin (Hb A1C)    Specimen: Blood   Result Value Ref Range    Hemoglobin A1C 8 %    Lot Number [id number]     Expiration Date 01/31/24        Diagnoses and all orders for this visit:    1. Upper respiratory tract infection, unspecified type (Primary)  -     azithromycin (Zithromax Z-Chas) 250 MG tablet; Take 2 tablets the first day, then 1 tablet daily for 4 days.  Dispense: 6 tablet; Refill: 0    increased fluids and rest.   Continue mucinex dm as directed.   Follow up prn      FOLLOW-UP  As discussed during visit with PCP/[facility] if no improvement or Urgent Care/Emergency Department if worsening of symptoms    Patient verbalizes understanding of medication dosage, comfort measures, instructions for treatment and follow-up.    Patsy Murrell, MALCOM  04/18/2022  10:09 EDT    This visit was performed via Telehealth.  This patient has been instructed to follow-up with their primary care provider if their symptoms worsen or the treatment provided does not resolve their illness.

## 2022-04-27 RX ORDER — OMEPRAZOLE 40 MG/1
CAPSULE, DELAYED RELEASE ORAL
Qty: 30 CAPSULE | Refills: 0 | Status: SHIPPED | OUTPATIENT
Start: 2022-04-27 | End: 2022-05-31

## 2022-04-27 RX ORDER — INSULIN GLARGINE 100 [IU]/ML
INJECTION, SOLUTION SUBCUTANEOUS
Qty: 15 ML | Refills: 3 | Status: SHIPPED | OUTPATIENT
Start: 2022-04-27 | End: 2022-10-16

## 2022-05-31 ENCOUNTER — OFFICE VISIT (OUTPATIENT)
Dept: FAMILY MEDICINE CLINIC | Facility: CLINIC | Age: 42
End: 2022-05-31

## 2022-05-31 VITALS
BODY MASS INDEX: 28 KG/M2 | OXYGEN SATURATION: 97 % | TEMPERATURE: 97.8 F | HEART RATE: 93 BPM | SYSTOLIC BLOOD PRESSURE: 144 MMHG | WEIGHT: 211.3 LBS | DIASTOLIC BLOOD PRESSURE: 68 MMHG | HEIGHT: 73 IN

## 2022-05-31 DIAGNOSIS — J06.9 UPPER RESPIRATORY TRACT INFECTION, UNSPECIFIED TYPE: ICD-10-CM

## 2022-05-31 PROCEDURE — 99213 OFFICE O/P EST LOW 20 MIN: CPT | Performed by: NURSE PRACTITIONER

## 2022-05-31 RX ORDER — OMEPRAZOLE 40 MG/1
CAPSULE, DELAYED RELEASE ORAL
Qty: 30 CAPSULE | Refills: 0 | Status: SHIPPED | OUTPATIENT
Start: 2022-05-31 | End: 2022-07-05

## 2022-05-31 RX ORDER — AZITHROMYCIN 250 MG/1
TABLET, FILM COATED ORAL
Qty: 6 TABLET | Refills: 0 | Status: SHIPPED | OUTPATIENT
Start: 2022-05-31 | End: 2022-07-07

## 2022-05-31 NOTE — PROGRESS NOTES
"Chief Complaint  Cough and URI (Chest congestion)    Subjective        George Hunter presents to [facility]  Patient presents to the office today with complaints of chest congestion and a productive cough.  He states symptoms of been present for 2 days.  He did denies any fevers, body aches nausea vomiting diarrhea.  He does state that he has had a mild sore throat with the onset of symptoms.  He denies any changes in his taste or smell.  He does state that he is tested daily at work for COVID-19 and he has been negative.  He also states that his son has been ill with similar symptoms.    Cough    URI   Associated symptoms include coughing.       Objective   Vital Signs:  /68 (BP Location: Right arm, Patient Position: Sitting, Cuff Size: Adult)   Pulse 93   Temp 97.8 °F (36.6 °C) (Temporal)   Ht 185.4 cm (73\")   Wt 95.8 kg (211 lb 4.8 oz)   SpO2 97%   BMI 27.88 kg/m²     BMI is >= 25 and < 30. (Overweight) The following options were offered after discussion: nutrition counseling/recommendations      Physical Exam  Vitals reviewed.   Constitutional:       Appearance: Normal appearance.   HENT:      Right Ear: Tympanic membrane, ear canal and external ear normal.      Left Ear: Tympanic membrane, ear canal and external ear normal.      Nose: Nose normal.      Mouth/Throat:      Mouth: Mucous membranes are moist.      Pharynx: Posterior oropharyngeal erythema present.   Eyes:      Extraocular Movements: Extraocular movements intact.      Conjunctiva/sclera: Conjunctivae normal.      Pupils: Pupils are equal, round, and reactive to light.   Cardiovascular:      Rate and Rhythm: Normal rate and regular rhythm.      Pulses: Normal pulses.      Heart sounds: Normal heart sounds, S1 normal and S2 normal. No murmur heard.  Pulmonary:      Effort: Pulmonary effort is normal. No respiratory distress.      Breath sounds: Normal breath sounds.   Skin:     General: Skin is warm and " dry.   Neurological:      Mental Status: He is alert and oriented to person, place, and time.   Psychiatric:         Attention and Perception: Attention normal.         Mood and Affect: Mood normal.         Behavior: Behavior normal.        Result Review :               Assessment and Plan   Diagnoses and all orders for this visit:    1. Upper respiratory tract infection, unspecified type  -     azithromycin (Zithromax Z-Chas) 250 MG tablet; Take 2 tablets the first day, then 1 tablet daily for 4 days.  Dispense: 6 tablet; Refill: 0             Follow Up   Return if symptoms worsen or fail to improve.  Patient was given instructions and counseling regarding his condition or for health maintenance advice. Please see specific information pulled into the AVS if appropriate.

## 2022-06-01 ENCOUNTER — HOSPITAL ENCOUNTER (EMERGENCY)
Facility: HOSPITAL | Age: 42
Discharge: HOME | End: 2022-06-01
Attending: EMERGENCY MEDICINE | Admitting: EMERGENCY MEDICINE

## 2022-06-01 ENCOUNTER — APPOINTMENT (OUTPATIENT)
Dept: GENERAL RADIOLOGY | Facility: HOSPITAL | Age: 42
End: 2022-06-01

## 2022-06-01 VITALS
TEMPERATURE: 98.1 F | OXYGEN SATURATION: 97 % | HEIGHT: 73 IN | BODY MASS INDEX: 27.49 KG/M2 | HEART RATE: 71 BPM | SYSTOLIC BLOOD PRESSURE: 125 MMHG | DIASTOLIC BLOOD PRESSURE: 83 MMHG | RESPIRATION RATE: 18 BRPM | WEIGHT: 207.45 LBS

## 2022-06-01 DIAGNOSIS — B34.9 VIRAL SYNDROME: Primary | ICD-10-CM

## 2022-06-01 LAB
ALBUMIN SERPL-MCNC: 4.3 G/DL (ref 3.5–5.2)
ALBUMIN/GLOB SERPL: 1.5 G/DL
ALP SERPL-CCNC: 103 U/L (ref 39–117)
ALT SERPL W P-5'-P-CCNC: 38 U/L (ref 1–41)
ANION GAP SERPL CALCULATED.3IONS-SCNC: 9.3 MMOL/L (ref 5–15)
AST SERPL-CCNC: 37 U/L (ref 1–40)
BASOPHILS # BLD AUTO: 0.05 10*3/MM3 (ref 0–0.2)
BASOPHILS NFR BLD AUTO: 0.8 % (ref 0–1.5)
BILIRUB SERPL-MCNC: 0.7 MG/DL (ref 0–1.2)
BUN SERPL-MCNC: 7 MG/DL (ref 6–20)
BUN/CREAT SERPL: 8.5 (ref 7–25)
CALCIUM SPEC-SCNC: 9.6 MG/DL (ref 8.6–10.5)
CHLORIDE SERPL-SCNC: 104 MMOL/L (ref 98–107)
CO2 SERPL-SCNC: 24.7 MMOL/L (ref 22–29)
CREAT SERPL-MCNC: 0.82 MG/DL (ref 0.76–1.27)
DEPRECATED RDW RBC AUTO: 38.7 FL (ref 37–54)
EGFRCR SERPLBLD CKD-EPI 2021: 112.5 ML/MIN/1.73
EOSINOPHIL # BLD AUTO: 0.33 10*3/MM3 (ref 0–0.4)
EOSINOPHIL NFR BLD AUTO: 5.6 % (ref 0.3–6.2)
ERYTHROCYTE [DISTWIDTH] IN BLOOD BY AUTOMATED COUNT: 12.1 % (ref 12.3–15.4)
FLUAV AG NPH QL: NEGATIVE
FLUBV AG NPH QL IA: NEGATIVE
GLOBULIN UR ELPH-MCNC: 2.9 GM/DL
GLUCOSE SERPL-MCNC: 72 MG/DL (ref 65–99)
HCT VFR BLD AUTO: 47.8 % (ref 37.5–51)
HGB BLD-MCNC: 16.6 G/DL (ref 13–17.7)
HOLD SPECIMEN: NORMAL
HOLD SPECIMEN: NORMAL
IMM GRANULOCYTES # BLD AUTO: 0 10*3/MM3 (ref 0–0.05)
IMM GRANULOCYTES NFR BLD AUTO: 0 % (ref 0–0.5)
LYMPHOCYTES # BLD AUTO: 2.01 10*3/MM3 (ref 0.7–3.1)
LYMPHOCYTES NFR BLD AUTO: 34 % (ref 19.6–45.3)
MCH RBC QN AUTO: 30.2 PG (ref 26.6–33)
MCHC RBC AUTO-ENTMCNC: 34.7 G/DL (ref 31.5–35.7)
MCV RBC AUTO: 87.1 FL (ref 79–97)
MONOCYTES # BLD AUTO: 0.52 10*3/MM3 (ref 0.1–0.9)
MONOCYTES NFR BLD AUTO: 8.8 % (ref 5–12)
NEUTROPHILS NFR BLD AUTO: 3 10*3/MM3 (ref 1.7–7)
NEUTROPHILS NFR BLD AUTO: 50.8 % (ref 42.7–76)
NRBC BLD AUTO-RTO: 0 /100 WBC (ref 0–0.2)
PLATELET # BLD AUTO: 259 10*3/MM3 (ref 140–450)
PMV BLD AUTO: 9.8 FL (ref 6–12)
POTASSIUM SERPL-SCNC: 4.1 MMOL/L (ref 3.5–5.2)
PROT SERPL-MCNC: 7.2 G/DL (ref 6–8.5)
RBC # BLD AUTO: 5.49 10*6/MM3 (ref 4.14–5.8)
S PYO AG THROAT QL: NEGATIVE
SARS-COV-2 RNA PNL SPEC NAA+PROBE: DETECTED
SODIUM SERPL-SCNC: 138 MMOL/L (ref 136–145)
WBC NRBC COR # BLD: 5.91 10*3/MM3 (ref 3.4–10.8)
WHOLE BLOOD HOLD COAG: NORMAL
WHOLE BLOOD HOLD SPECIMEN: NORMAL

## 2022-06-01 PROCEDURE — C9803 HOPD COVID-19 SPEC COLLECT: HCPCS | Performed by: EMERGENCY MEDICINE

## 2022-06-01 PROCEDURE — 87804 INFLUENZA ASSAY W/OPTIC: CPT

## 2022-06-01 PROCEDURE — 80053 COMPREHEN METABOLIC PANEL: CPT

## 2022-06-01 PROCEDURE — U0004 COV-19 TEST NON-CDC HGH THRU: HCPCS

## 2022-06-01 PROCEDURE — 25010000002 ONDANSETRON PER 1 MG: Performed by: EMERGENCY MEDICINE

## 2022-06-01 PROCEDURE — 87081 CULTURE SCREEN ONLY: CPT | Performed by: EMERGENCY MEDICINE

## 2022-06-01 PROCEDURE — 99283 EMERGENCY DEPT VISIT LOW MDM: CPT

## 2022-06-01 PROCEDURE — 36415 COLL VENOUS BLD VENIPUNCTURE: CPT

## 2022-06-01 PROCEDURE — 87880 STREP A ASSAY W/OPTIC: CPT

## 2022-06-01 PROCEDURE — 96374 THER/PROPH/DIAG INJ IV PUSH: CPT

## 2022-06-01 PROCEDURE — 71045 X-RAY EXAM CHEST 1 VIEW: CPT

## 2022-06-01 PROCEDURE — 85025 COMPLETE CBC W/AUTO DIFF WBC: CPT

## 2022-06-01 RX ORDER — ONDANSETRON 4 MG/1
4 TABLET, ORALLY DISINTEGRATING ORAL EVERY 8 HOURS PRN
Qty: 14 TABLET | Refills: 0 | Status: SHIPPED | OUTPATIENT
Start: 2022-06-01 | End: 2022-07-07

## 2022-06-01 RX ORDER — ONDANSETRON 2 MG/ML
4 INJECTION INTRAMUSCULAR; INTRAVENOUS ONCE
Status: COMPLETED | OUTPATIENT
Start: 2022-06-01 | End: 2022-06-01

## 2022-06-01 RX ORDER — SODIUM CHLORIDE 0.9 % (FLUSH) 0.9 %
10 SYRINGE (ML) INJECTION AS NEEDED
Status: DISCONTINUED | OUTPATIENT
Start: 2022-06-01 | End: 2022-06-01 | Stop reason: HOSPADM

## 2022-06-01 RX ADMIN — SODIUM CHLORIDE 1000 ML: 9 INJECTION, SOLUTION INTRAVENOUS at 13:54

## 2022-06-01 RX ADMIN — ONDANSETRON 4 MG: 2 INJECTION INTRAMUSCULAR; INTRAVENOUS at 13:55

## 2022-06-01 NOTE — PROGRESS NOTES
Patient called back, verified name and date of birth, went over +Covid results with him. He lives with his wife and son. He is and will continue to isolate from them, as they do not have any new symptoms. We went over CDC guidelines and he will let his employer know.

## 2022-06-01 NOTE — ED PROVIDER NOTES
Time: 1:05 PM EDT  Arrived by: private car  Chief Complaint: COVID/Flu-like symptoms  History provided by: Patient  History is limited by: N/A     History of Present Illness:  Patient is a 41 y.o. male that presents to the emergency department with COVID/Flu-like symptoms over the past 2 days. The reports he first had a cough, HA, and congestion two days ago, and today he felt nauseous and has generalized body aches. No vomiting or fever. The pt has been getting tested frequently and has tested negative for COVID (last tested yesterday). The pt's symptoms are moderate in severity and have no other reported modifying factors.     Pt has a hx of DM I on insulin. The pt does not check his BG daily. No hx of DKA.         History provided by:  Patient   used: No        Similar Symptoms Previously: N/a  Recently seen: Seen by PCP yesterday      Patient Care Team  Primary Care Provider: Jak Colindres APRN    Past Medical History:     Allergies   Allergen Reactions   • Penicillins Anaphylaxis     Childhood allergy     • Codeine Itching     Past Medical History:   Diagnosis Date   • Allergies    • Anxiety    • Broken bones    • Deafness    • Diabetes ([facility])    • Diabetes mellitus type I (HCC)    • Foot pain, bilateral 2018   • Fracture    • Hammertoe    • Head injury    • Hernia cerebri ([facility])    • Hypothyroidism    • Neuropathy in diabetes ([facility]ly [facility]) 2009   • Plantar fascial fibromatosis of both feet 2018   • Toe pain, right      Past Surgical History:   Procedure Laterality Date   • HERNIA REPAIR       Family History   Family history unknown: Yes       Home Medications:  Prior to Admission medications    Medication Sig Start Date End Date Taking? Authorizing Provider   azithromycin (Zithromax Z-Chas) 250 MG tablet Take 2 tablets the first day, then 1 tablet daily for 4 days. 5/31/22   Jak Colindres APRN   cetirizine-pseudoephedrine (ZyrTEC-D) 5-120 MG per 12 hr tablet Take 1 tablet by mouth 2 (Two) Times a Day.  1/15/22   Annia Jackson MD   Euthyrox 25 MCG tablet Take 25 mcg by mouth Daily. 7/12/21   Provider, MD Rudolph   insulin aspart (NovoLOG FlexPen) 100 UNIT/ML solution pen-injector sc pen Inject 30 Units under the skin into the appropriate area as directed 3 (Three) Times a Day With Meals. 9/2/21   Indu Lloyd APRN   Insulin Glargine (BASAGLAR KWIKPEN) 100 UNIT/ML injection pen INJECT 40 UNITS SUBCUTANEOUSLY ONCE DAILY TO APPROPRIATE AREA AS DIRECTED 4/27/22   Indu Lloyd APRN   lisinopril (Zestril) 2.5 MG tablet Take 1 tablet by mouth Daily. 11/11/21   Indu Lloyd APRN   omeprazole (priLOSEC) 40 MG capsule TAKE 1 CAPSULE BY MOUTH ONCE DAILY BEFORE A MEAL 5/31/22   Jak Colindres APRN   promethazine-dextromethorphan (PROMETHAZINE-DM) 6.25-15 MG/5ML syrup Take 5 mL by mouth 4 (Four) Times a Day As Needed for Cough. 4/13/22   Lori Reddy APRN   simvastatin (ZOCOR) 10 MG tablet Take 1 tablet by mouth Every Night. 11/11/21   Indu Lloyd APRN        Social History:   Social History     Tobacco Use   • Smoking status: Former Smoker     Packs/day: 0.50     Years: 10.00     Pack years: 5.00     Types: Cigarettes, Cigars, Cigarettes, Cigars   • Smokeless tobacco: Never Used   Vaping Use   • Vaping Use: Never used   Substance Use Topics   • Alcohol use: Not Currently   • Drug use: Never     Recent travel: no     Review of Systems:  Review of Systems   Constitutional: Negative for chills and fever.   HENT: Positive for congestion. Negative for rhinorrhea and sore throat.    Eyes: Negative for pain and visual disturbance.   Respiratory: Positive for cough. Negative for apnea, chest tightness and shortness of breath.    Cardiovascular: Negative for chest pain and palpitations.   Gastrointestinal: Positive for nausea. Negative for abdominal pain, diarrhea and vomiting.   Genitourinary: Negative for difficulty urinating and dysuria.   Musculoskeletal: Positive for myalgias  "(Generalized). Negative for joint swelling.   Skin: Negative for color change.   Neurological: Positive for headaches. Negative for seizures.   Psychiatric/Behavioral: Negative.    All other systems reviewed and are negative.       Physical Exam:  /70   Pulse 77   Temp 98.1 °F (36.7 °C)   Resp 18   Ht 185.4 cm (73\")   Wt 94.1 kg (207 lb 7.3 oz)   SpO2 92%   BMI 27.37 kg/m²     Physical Exam  Vitals and nursing note reviewed.   Constitutional:       General: He is not in acute distress.     Appearance: Normal appearance. He is not toxic-appearing.   HENT:      Head: Normocephalic and atraumatic.      Jaw: There is normal jaw occlusion.   Eyes:      General: Lids are normal.      Extraocular Movements: Extraocular movements intact.      Conjunctiva/sclera: Conjunctivae normal.      Pupils: Pupils are equal, round, and reactive to light.   Cardiovascular:      Rate and Rhythm: Normal rate and regular rhythm.      Pulses: Normal pulses.      Heart sounds: Normal heart sounds.   Pulmonary:      Effort: Pulmonary effort is normal. No respiratory distress.      Breath sounds: Normal breath sounds. No wheezing or rhonchi.   Abdominal:      General: Abdomen is flat.      Palpations: Abdomen is soft.      Tenderness: There is no abdominal tenderness. There is no guarding or rebound.   Musculoskeletal:         General: Normal range of motion.      Cervical back: Normal range of motion and neck supple.      Right lower leg: No edema.      Left lower leg: No edema.   Skin:     General: Skin is warm and dry.   Neurological:      Mental Status: He is alert and oriented to person, place, and time. Mental status is at baseline.   Psychiatric:         Mood and Affect: Mood normal.                Medications in the Emergency Department:  Medications   sodium chloride 0.9 % flush 10 mL (has no administration in time range)   sodium chloride 0.9 % bolus 1,000 mL (1,000 mL Intravenous New Bag 6/1/22 2238)   ondansetron " (ZOFRAN) injection 4 mg (4 mg Intravenous Given 6/1/22 1355)        Labs  Lab Results (last 24 hours)     Procedure Component Value Units Date/Time    Influenza Antigen, Rapid - Swab, Nasopharynx [[id number]]  (Normal) Collected: 06/01/22 1056    Specimen: Swab from Nasopharynx Updated: 06/01/22 1214     Influenza A Ag, EIA Negative     Influenza B Ag, EIA Negative    Rapid Strep A Screen - Swab, Throat [[id number]]  (Normal) Collected: 06/01/22 1056    Specimen: Swab from Throat Updated: 06/01/22 1213     Strep A Ag Negative    COVID-19,APTIMA PANTHER(LAKSHMI),[facility], NP/OP SWAB IN UTM/VTM/SALINE TRANSPORT MEDIA,24 HR TAT - Swab, Nasopharynx [[id number]] Collected: 06/01/22 1056    Specimen: Swab from Nasopharynx Updated: 06/01/22 1142    Beta Strep Culture, Throat - Swab, Throat [[id number]] Collected: 06/01/22 1056    Specimen: Swab from Throat Updated: 06/01/22 1213    Comprehensive Metabolic Panel [[id number]] Collected: 06/01/22 1103    Specimen: Blood from Arm, Right Updated: 06/01/22 1126     Glucose 72 mg/dL      BUN 7 mg/dL      Creatinine 0.82 mg/dL      Sodium 138 mmol/L      Potassium 4.1 mmol/L      Chloride 104 mmol/L      CO2 24.7 mmol/L      Calcium 9.6 mg/dL      Total Protein 7.2 g/dL      Albumin 4.30 g/dL      ALT (SGPT) 38 U/L      AST (SGOT) 37 U/L      Alkaline Phosphatase 103 U/L      Total Bilirubin 0.7 mg/dL      Globulin 2.9 gm/dL      A/G Ratio 1.5 g/dL      BUN/Creatinine Ratio 8.5     Anion Gap 9.3 mmol/L      eGFR 112.5 mL/min/1.73      Comment: National Kidney Foundation and American Society of Nephrology (ASN) Task Force recommended calculation based on the Chronic Kidney Disease Epidemiology Collaboration (CKD-EPI) equation refit without adjustment for race.       Narrative:      GFR Normal >60  Chronic Kidney Disease <60  Kidney Failure <15      CBC & Differential [[id number]]  (Abnormal) Collected: 06/01/22 1103    Specimen: Blood from Arm, Right Updated: 06/01/22 1111    Narrative:       The following orders were created for panel order CBC & Differential.  Procedure                               Abnormality         Status                     ---------                               -----------         ------                     CBC Auto Differential[[id number]]        Abnormal            Final result                 Please view results for these tests on the individual orders.    CBC Auto Differential [[id number]]  (Abnormal) Collected: 06/01/22 1103    Specimen: Blood from Arm, Right Updated: 06/01/22 1111     WBC 5.91 10*3/mm3      RBC 5.49 10*6/mm3      Hemoglobin 16.6 g/dL      Hematocrit 47.8 %      MCV 87.1 fL      MCH 30.2 pg      MCHC 34.7 g/dL      RDW 12.1 %      RDW-SD 38.7 fl      MPV 9.8 fL      Platelets 259 10*3/mm3      Neutrophil % 50.8 %      Lymphocyte % 34.0 %      Monocyte % 8.8 %      Eosinophil % 5.6 %      Basophil % 0.8 %      Immature Grans % 0.0 %      Neutrophils, Absolute 3.00 10*3/mm3      Lymphocytes, Absolute 2.01 10*3/mm3      Monocytes, Absolute 0.52 10*3/mm3      Eosinophils, Absolute 0.33 10*3/mm3      Basophils, Absolute 0.05 10*3/mm3      Immature Grans, Absolute 0.00 10*3/mm3      nRBC 0.0 /100 WBC            Imaging:  XR Chest 1 View    Result Date: 6/1/2022  PROCEDURE: XR CHEST 1 VW  COMPARISON: [facility], , CHEST AP/PA 1 VIEW, 2/26/2020, 23:40.  INDICATIONS: Nausea; Vomiting; Generalized Body Aches; Cough; Headache  FINDINGS:  The heart is normal in size.  The lungs are well-expanded and free of infiltrates.  Bony structures appear intact.       No active disease is seen.       MICHELE SCHWARTZ MD       Electronically Signed and Approved By: MICHELE SCHWARTZ MD on 6/01/2022 at 13:58               Procedures:  Procedures    Progress                            Medical Decision Making:  MDM  Number of Diagnoses or Management Options  Viral syndrome  Diagnosis management comments: In summary this is a 41-year-old diabetic male who presents  emerged department complaints of cough, nausea and body aches.  He had negative COVID-19 test yesterday, pending 1 today.  Influenza a test is negative, CBC, CMP unremarkable.  No DKA identified.  Chest x-ray is clear.  Appears to be a viral illness.  Is nontoxic-appearing and in no acute distress.  Very strict return to ER and follow-up instructions have been provided to the patient.         Amount and/or Complexity of Data Reviewed  Clinical lab tests: reviewed         Final diagnoses:   Viral syndrome        Disposition:  ED Disposition     ED Disposition   Discharge    Condition   Stable    Comment   --             Documentation assistance provided by Franck Menjivar acting as scribe for Rai Bennett MD  . Information recorded by the scribe was done at my direction and has been verified and validated by me.        Franck Menjivar  06/01/22 5920       Rai Bennett MD  06/01/22 3085

## 2022-06-03 LAB — BACTERIA SPEC AEROBE CULT: NORMAL

## 2022-06-05 ENCOUNTER — HOSPITAL ENCOUNTER (EMERGENCY)
Facility: HOSPITAL | Age: 42
Discharge: LEFT BEFORE DISCHARGE | End: 2022-06-05

## 2022-06-05 PROCEDURE — 99211 OFF/OP EST MAY X REQ PHY/QHP: CPT

## 2022-07-05 RX ORDER — OMEPRAZOLE 40 MG/1
CAPSULE, DELAYED RELEASE ORAL
Qty: 30 CAPSULE | Refills: 0 | Status: SHIPPED | OUTPATIENT
Start: 2022-07-05 | End: 2022-07-05 | Stop reason: ALTCHOICE

## 2022-07-05 RX ORDER — OMEPRAZOLE 40 MG/1
40 CAPSULE, DELAYED RELEASE ORAL DAILY
Qty: 90 CAPSULE | Refills: 0 | Status: SHIPPED | OUTPATIENT
Start: 2022-07-05 | End: 2022-11-14

## 2022-07-05 NOTE — TELEPHONE ENCOUNTER
Medication pended. Patient is completely out. Wanting to get today if possible.   
NOV: None   LOV: 05/31/2022  
0 = independent

## 2022-07-07 ENCOUNTER — OFFICE VISIT (OUTPATIENT)
Dept: DIABETES SERVICES | Facility: HOSPITAL | Age: 42
End: 2022-07-07

## 2022-07-07 VITALS
HEART RATE: 75 BPM | HEIGHT: 73 IN | SYSTOLIC BLOOD PRESSURE: 108 MMHG | TEMPERATURE: 97.4 F | DIASTOLIC BLOOD PRESSURE: 81 MMHG | WEIGHT: 209 LBS | BODY MASS INDEX: 27.7 KG/M2 | OXYGEN SATURATION: 96 %

## 2022-07-07 DIAGNOSIS — E10.65 UNCONTROLLED TYPE 1 DIABETES MELLITUS WITH HYPERGLYCEMIA: Primary | ICD-10-CM

## 2022-07-07 DIAGNOSIS — F32.A DEPRESSION, UNSPECIFIED DEPRESSION TYPE: ICD-10-CM

## 2022-07-07 DIAGNOSIS — E66.3 OVERWEIGHT (BMI 25.0-29.9): ICD-10-CM

## 2022-07-07 LAB
EXPIRATION DATE: ABNORMAL
GLUCOSE BLDC GLUCOMTR-MCNC: 109 MG/DL (ref 70–99)
HBA1C MFR BLD: 7.7 %
Lab: ABNORMAL

## 2022-07-07 PROCEDURE — 83036 HEMOGLOBIN GLYCOSYLATED A1C: CPT | Performed by: NURSE PRACTITIONER

## 2022-07-07 PROCEDURE — G0463 HOSPITAL OUTPT CLINIC VISIT: HCPCS | Performed by: NURSE PRACTITIONER

## 2022-07-07 PROCEDURE — 82962 GLUCOSE BLOOD TEST: CPT | Performed by: NURSE PRACTITIONER

## 2022-07-07 PROCEDURE — 99214 OFFICE O/P EST MOD 30 MIN: CPT | Performed by: NURSE PRACTITIONER

## 2022-07-07 RX ORDER — DULOXETIN HYDROCHLORIDE 30 MG/1
30 CAPSULE, DELAYED RELEASE ORAL DAILY
Qty: 30 CAPSULE | Refills: 3 | Status: SHIPPED | OUTPATIENT
Start: 2022-07-07 | End: 2022-08-16 | Stop reason: ALTCHOICE

## 2022-07-07 NOTE — PROGRESS NOTES
"Chief Complaint  Diabetes (3 month follow up, pt reports \"feeling in a funk about a lot and more confusion\")    Referred By: MALCOM Farias presents to [facility] for diabetes medication management    History of Present Illness    Visit type:  follow-up  Diabetes type:  Type 1  Current diabetes status/concerns/issues:  Glucose levels are running high at times mostly because of wavering appetite  Other health concerns: Dealing with depression for no particular reason.  He is unhappy with his job.  He says he does not really know why he is feeling this way because everything at home is really good.  His wife did recently have a miscarriage.  They have moved into a new home recently.  He denies any thoughts of self-harm or suicidal ideation.  He had covid in early June.  He was treated with a steroid.  Diabetes symptoms:    Polyuria: No   Polydipsia: No   Polyphagia: No   Blurred vision: No   Excessive fatigue: Yes  Diabetes complications:  Neuro: None  Renal: None  Eyes: None  Amputation/Wounds: None  GI: None  Cardiovascular: None  ED: None  Other: None  Hypoglycemia:  None reported at this time  Hypoglycemia Symptoms:  No hypoglycemia at this time  Current diabetes treatment:  Basaglar 40 units daily and Humalog using a 1-10 carbohydrate ratio to 1-20 correction for glucose levels greater than 120 mg/dL  Blood glucose device:  Meter  Blood glucose monitoring frequency:  3  Blood glucose range/average:  140-150  Diet:  He states he has days where he will eat a lot all day long and then other days when he barely eats anything  Activity/Exercise:  active at work    Past Medical History:   Diagnosis Date   • Allergies    • Anxiety    • Broken bones    • Deafness    • Diabetes (HCC)    • Diabetes mellitus type I (HCC)    • Foot pain, bilateral 2018   • Fracture    • Hammertoe    • Head injury    • Hernia cerebri (HCC)    • Hypothyroidism    • " Neuropathy in diabetes (HCC) 2009   • Plantar fascial fibromatosis of both feet 2018   • Toe pain, right      Past Surgical History:   Procedure Laterality Date   • HERNIA REPAIR       Family History   Family history unknown: Yes     Social History     Socioeconomic History   • Marital status:    • Number of children: 1   Tobacco Use   • Smoking status: Former Smoker     Packs/day: 0.50     Years: 10.00     Pack years: 5.00     Types: Cigarettes, Cigars, Cigarettes, Cigars   • Smokeless tobacco: Never Used   Vaping Use   • Vaping Use: Never used   Substance and Sexual Activity   • Alcohol use: Not Currently   • Drug use: Never   • Sexual activity: Yes     Partners: Female     Allergies   Allergen Reactions   • Penicillins Anaphylaxis     Childhood allergy     • Codeine Itching       Current Outpatient Medications:   •  cetirizine-pseudoephedrine (ZyrTEC-D) 5-120 MG per 12 hr tablet, Take 1 tablet by mouth 2 (Two) Times a Day., Disp: 24 tablet, Rfl: 0  •  Euthyrox 25 MCG tablet, Take 25 mcg by mouth Daily., Disp: , Rfl:   •  insulin aspart (NovoLOG FlexPen) 100 UNIT/ML solution pen-injector sc pen, Inject 30 Units under the skin into the appropriate area as directed 3 (Three) Times a Day With Meals., Disp: 9 pen, Rfl: 5  •  Insulin Glargine (BASAGLAR KWIKPEN) 100 UNIT/ML injection pen, INJECT 40 UNITS SUBCUTANEOUSLY ONCE DAILY TO APPROPRIATE AREA AS DIRECTED, Disp: 15 mL, Rfl: 3  •  lisinopril (Zestril) 2.5 MG tablet, Take 1 tablet by mouth Daily., Disp: 30 tablet, Rfl: 3  •  omeprazole (priLOSEC) 40 MG capsule, Take 1 capsule by mouth Daily. before a meal, Disp: 90 capsule, Rfl: 0  •  promethazine-dextromethorphan (PROMETHAZINE-DM) 6.25-15 MG/5ML syrup, Take 5 mL by mouth 4 (Four) Times a Day As Needed for Cough., Disp: 100 mL, Rfl: 0  •  simvastatin (ZOCOR) 10 MG tablet, Take 1 tablet by mouth Every Night., Disp: 30 tablet, Rfl: 3  •  DULoxetine (CYMBALTA) 30 MG capsule, Take 1 capsule by mouth Daily., Disp:  "30 capsule, Rfl: 3    Review of Systems   Constitutional: Positive for activity change, appetite change and fatigue. Negative for fever, unexpected weight gain and unexpected weight loss.   HENT: Negative for congestion, ear pain, facial swelling, hearing loss, sore throat and tinnitus.    Eyes: Negative for blurred vision, double vision, redness and visual disturbance.   Respiratory: Negative for cough, shortness of breath and wheezing.    Cardiovascular: Negative for chest pain, palpitations and leg swelling.   Gastrointestinal: Negative for abdominal distention, constipation, diarrhea, nausea, vomiting, GERD and indigestion.   Endocrine: Negative for polydipsia, polyphagia and polyuria.   Genitourinary: Negative for difficulty urinating, frequency and urgency.   Musculoskeletal: Positive for back pain and neck pain. Negative for gait problem and myalgias.   Skin: Negative for rash, skin lesions and wound.   Neurological: Positive for numbness and confusion. Negative for seizures, speech difficulty, weakness and headache.   Psychiatric/Behavioral: Positive for sleep disturbance, depressed mood and stress. The patient is nervous/anxious.         Objective     Vitals:    07/07/22 0810   BP: 108/81   BP Location: Left arm   Patient Position: Sitting   Cuff Size: Adult   Pulse: 75   Temp: 97.4 °F (36.3 °C)   SpO2: 96%   Weight: 94.8 kg (208 lb 15.9 oz)   Height: 185.4 cm (73\")   PainSc:   1     Body mass index is 27.57 kg/m².    Physical Exam  Constitutional:       Appearance: Normal appearance.      Comments: Overweight with BMI of 27.57   HENT:      Head: Normocephalic and atraumatic.      Right Ear: External ear normal.      Left Ear: External ear normal.      Nose: Nose normal.   Eyes:      Extraocular Movements: Extraocular movements intact.      Conjunctiva/sclera: Conjunctivae normal.   Pulmonary:      Effort: Pulmonary effort is normal.   Musculoskeletal:         General: Normal range of motion.      Cervical " back: Normal range of motion.   Skin:     General: Skin is warm and dry.   Neurological:      General: No focal deficit present.      Mental Status: He is alert and oriented to person, place, and time. Mental status is at baseline.   Psychiatric:         Mood and Affect: Mood normal.         Behavior: Behavior normal.         Thought Content: Thought content normal.         Judgment: Judgment normal.         Result Review :   The following data was reviewed by: MALCOM Silverman on 07/07/2022:    Point-of-care    Most Recent A1C    HGBA1C Most Recent 7/7/22   Hemoglobin A1C 7.7             A1C Last 3 Results    HGBA1C Last 3 Results 11/11/21 3/31/22 7/7/22   Hemoglobin A1C 7.2 8 7.7             Glucose   Date Value Ref Range Status   07/07/2022 109 (H) 70 - 99 mg/dL Final     Comment:     Serial Number: [id number]:  [id number]       Creatinine   Date Value Ref Range Status   06/01/2022 0.82 0.76 - 1.27 mg/dL Final   02/01/2022 0.89 0.76 - 1.27 mg/dL Final       eGFR   Date Value Ref Range Status   06/01/2022 112.5 >60.0 mL/min/1.73 Final     Comment:     National Kidney Foundation and American Society of Nephrology (ASN) Task Force recommended calculation based on the Chronic Kidney Disease Epidemiology Collaboration (CKD-EPI) equation refit without adjustment for race.       Labs collected on 6/1/2022 show normal renal function          Assessment: He has had a slight improvement in his A1c but remains above target.  He attributes his high glucose levels to excessive eating on some days.  He was also treated with steroids for COVID in early June.  He is dealing with some depression recently.  He states he has had a history of depression in the past that he treated with alcohol.  He currently denies any use of alcohol at this time.  He has never taken an antidepressant medication but is open to doing so.      Diagnoses and all orders for this visit:    1. Uncontrolled type 1 diabetes mellitus with  hyperglycemia (HCC) (Primary)  -     POC Glucose  -     POC Glycosylated Hemoglobin (Hb A1C)    2. Depression, unspecified depression type  -     DULoxetine (CYMBALTA) 30 MG capsule; Take 1 capsule by mouth Daily.  Dispense: 30 capsule; Refill: 3    3. Overweight (BMI 25.0-29.9)        Plan: No changes were made to his diabetes treatment plan.  He is to focus on taking sufficient amount of insulin to cover carbohydrate intake to prevent postprandial hyperglycemia.    In regards to his depression we will start him on Cymbalta 30 mg once a day.  We discussed potential adverse effects of the medication he will contact our office should he experience any of those.  He was cautioned not to suddenly stop the medication but to call the office so a discontinuation plan can be put in place should that need to occur.    The patient will monitor his blood glucose levels 3-4 times each day.  If he develops problematic hyperglycemia or hypoglycemia or adverse drug reactions, he will contact the office for further instructions.        Follow Up     No follow-ups on file.    Patient was given instructions and counseling regarding his condition or for health maintenance advice. Please see specific information pulled into the AVS if appropriate.     Idnu Lloyd, APRN  07/07/2022

## 2022-07-14 ENCOUNTER — TELEMEDICINE (OUTPATIENT)
Dept: FAMILY MEDICINE CLINIC | Facility: TELEHEALTH | Age: 42
End: 2022-07-14

## 2022-07-14 DIAGNOSIS — R11.2 NAUSEA AND VOMITING, UNSPECIFIED VOMITING TYPE: Primary | ICD-10-CM

## 2022-07-14 PROCEDURE — 99213 OFFICE O/P EST LOW 20 MIN: CPT | Performed by: NURSE PRACTITIONER

## 2022-07-14 RX ORDER — ONDANSETRON 8 MG/1
8 TABLET, ORALLY DISINTEGRATING ORAL EVERY 8 HOURS PRN
Qty: 15 TABLET | Refills: 0 | Status: SHIPPED | OUTPATIENT
Start: 2022-07-14 | End: 2022-12-16

## 2022-07-14 NOTE — PATIENT INSTRUCTIONS
Nausea and Vomiting, Adult  Nausea is the feeling that you have an upset stomach or that you are about to vomit. Vomiting is when stomach contents are thrown up and out of the mouth as a result of nausea. Vomiting can make you feel weak and cause you to become dehydrated.  Dehydration can make you feel tired and thirsty, cause you to have a dry mouth, and decrease how often you urinate. Older adults and people with other diseases or a weak disease-fighting system (immune system) are at higher risk for dehydration. It is important to treat your nausea and vomiting as told by your health care provider.  Follow these instructions at home:  Watch your symptoms for any changes. Tell your health care provider about them. Follow these instructions to care for yourself at home.  Eating and drinking         Take an oral rehydration solution (ORS). This is a drink that is sold at pharmacies and retail stores.  Drink clear fluids slowly and in small amounts as you are able. Clear fluids include water, ice chips, low-calorie sports drinks, and fruit juice that has water added (diluted fruit juice).  Eat bland, easy-to-digest foods in small amounts as you are able. These foods include bananas, applesauce, rice, lean meats, toast, and crackers.  Avoid fluids that contain a lot of sugar or caffeine, such as energy drinks, sports drinks, and soda.  Avoid alcohol.  Avoid spicy or fatty foods.  General instructions  Take over-the-counter and prescription medicines only as told by your health care provider.  Drink enough fluid to keep your urine pale yellow.  Wash your hands often using soap and water. If soap and water are not available, use hand .  Make sure that all people in your household wash their hands well and often.  Rest at home while you recover.  Watch your condition for any changes.  Breathe slowly and deeply when you feel nauseated.  Keep all follow-up visits as told by your health care provider. This is  important.  Contact a health care provider if:  Your symptoms get worse.  You have new symptoms.  You have a fever.  You cannot drink fluids without vomiting.  Your nausea does not go away after 2 days.  You feel light-headed or dizzy.  You have a headache.  You have muscle cramps.  You have a rash.  You have pain while urinating.  Get help right away if:  You have pain in your chest, neck, arm, or jaw.  You feel extremely weak or you faint.  You have persistent vomiting.  You have vomit that is bright red or looks like black coffee grounds.  You have bloody or black stools or stools that look like tar.  You have a severe headache, a stiff neck, or both.  You have severe pain, cramping, or bloating in your abdomen.  You have difficulty breathing, or you are breathing very quickly.  Your heart is beating very quickly.  Your skin feels cold and clammy.  You feel confused.  You have signs of dehydration, such as:  Dark urine, very little urine, or no urine.  Cracked lips.  Dry mouth.  Sunken eyes.  Sleepiness.  Weakness.  These symptoms may represent a serious problem that is an emergency. Do not wait to see if the symptoms will go away. Get medical help right away. Call your local emergency services (911 in the [place] Do not drive yourself to the hospital.  Summary  Nausea is the feeling that you have an upset stomach or that you are about to vomit. As nausea gets worse, it can lead to vomiting. Vomiting can make you feel weak and cause you to become dehydrated.  Follow instructions from your health care provider about eating and drinking to prevent dehydration.  Take over-the-counter and prescription medicines only as told by your health care provider.  Contact your health care provider if your symptoms get worse, or you have new symptoms.  Keep all follow-up visits as told by your health care provider. This is important.  This information is not intended to replace advice given to you by your health care provider.  Make sure you discuss any questions you have with your health care provider.  Document Revised: 04/10/2020 Document Reviewed: 05/28/2019  Elsevier Patient Education © 2021 [employer] Inc[employer]

## 2022-07-14 NOTE — PROGRESS NOTES
You have chosen to receive care through a telehealth visit.  Do you consent to use a video/audio connection for your medical care today? Yes     CHIEF COMPLAINT  No chief complaint on file.        HPI  George Hunter is a 41 y.o. male  presents with complaint of woke up this morning feeling nauseated with vomiting x 2.  Denies fever, abdominal pain, diarrhea.  Needs work note.  Was just taken off of Duloxetine due to side effects and thinks this may be causing his nausea.     Review of Systems   Gastrointestinal: Positive for nausea and vomiting. Negative for abdominal pain and diarrhea.   All other systems reviewed and are negative.      Past Medical History:   Diagnosis Date   • Allergies    • Anxiety    • Broken bones    • Deafness    • Diabetes ([facility])    • Diabetes mellitus type I ([facility])    • Foot pain, bilateral 2018   • Fracture    • Hammertoe    • Head injury    • Hernia cerebri ([facility])    • Hypothyroidism    • Neuropathy in diabetes ([facility]) 2009   • Plantar fascial fibromatosis of both feet 2018   • Toe pain, right        Family History   Family history unknown: Yes       Social History     Socioeconomic History   • Marital status:    • Number of children: 1   Tobacco Use   • Smoking status: Former Smoker     Packs/day: 0.50     Years: 10.00     Pack years: 5.00     Types: Cigarettes, Cigars, Cigarettes, Cigars   • Smokeless tobacco: Never Used   Vaping Use   • Vaping Use: Never used   Substance and Sexual Activity   • Alcohol use: Not Currently   • Drug use: Never   • Sexual activity: Yes     Partners: Female       George Hunter  reports that he has quit smoking. His smoking use included cigarettes, cigars, cigarettes, and cigars. He has a 5.00 pack-year smoking history. He has never used smokeless tobacco..               There were no vitals taken for this visit.    PHYSICAL EXAM  Physical Exam   Constitutional: He is oriented to person, place, and time. He appears well-developed and well-nourished. He  does not have a sickly appearance. He does not appear ill.   HENT:   Head: Normocephalic and atraumatic.   Pulmonary/Chest: Effort normal.  No respiratory distress.  Neurological: He is alert and oriented to person, place, and time.         Diagnoses and all orders for this visit:    1. Nausea and vomiting, unspecified vomiting type (Primary)  -     ondansetron ODT (Zofran ODT) 8 MG disintegrating tablet; Place 1 tablet on the tongue Every 8 (Eight) Hours As Needed for Nausea or Vomiting.  Dispense: 15 tablet; Refill: 0    --take medication as prescribed  --f/u in 2-3 days if no improvement  --work note sent to [facility] [employer]      FOLLOW-UP  As discussed during visit with PCP/[facility] if no improvement or Urgent Care/Emergency Department if worsening of symptoms    Patient verbalizes understanding of medication dosage, comfort measures, instructions for treatment and follow-up.    Lori Reddy, APRN  07/14/2022  12:22 EDT    The use of a video visit has been reviewed with the patient and verbal informed consent has been obtained. Myself and George Hunter participated in this visit. The patient is located in [address] [place] [address] [address] [place] [address].    I am located in [place], [place]. [employer] and [employer] were utilized. I spent 15 minutes in the patient's chart for this visit.

## 2022-08-16 ENCOUNTER — TELEMEDICINE (OUTPATIENT)
Dept: FAMILY MEDICINE CLINIC | Facility: TELEHEALTH | Age: 42
End: 2022-08-16

## 2022-08-16 DIAGNOSIS — Z20.822 COUGH WITH EXPOSURE TO COVID-19 VIRUS: Primary | ICD-10-CM

## 2022-08-16 DIAGNOSIS — R05.8 COUGH WITH EXPOSURE TO COVID-19 VIRUS: Primary | ICD-10-CM

## 2022-08-16 PROCEDURE — U0004 COV-19 TEST NON-CDC HGH THRU: HCPCS | Performed by: NURSE PRACTITIONER

## 2022-08-16 PROCEDURE — 99213 OFFICE O/P EST LOW 20 MIN: CPT | Performed by: NURSE PRACTITIONER

## 2022-08-16 NOTE — PROGRESS NOTES
You have chosen to receive care through a telehealth visit.The use of a video visit has been reviewed with the patient and verbal informed consent has been obtained. Video Options: MyChart/Zoom The visit included audio and video interaction. No technical issues occurred during this visit.  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 He denies fever, chills, SOA or wheezing.    Sore Throat   This is a new problem. Episode onset: 2 days. The problem has been unchanged. There has been no fever. Associated symptoms include congestion, headaches and a hoarse voice. Pertinent negatives include no shortness of breath, trouble swallowing or vomiting.     Objective   Vital Signs:   There were no vitals taken for this visit.    Physical Exam   Constitutional: No distress.   HENT:   Nose: Congestion present.   Pulmonary/Chest: Effort normal.  No respiratory distress.  Psychiatric: His speech is normal.     Result Review :                 Assessment and Plan    Diagnoses and all orders for this visit:    1. Cough with exposure to COVID-19 virus (Primary)  -     COVID-19,[facility] ROUTINE, NP/OP SWAB IN TRANSPORT MEDIA OR ESWAB 72 HR TAT - Swab, Nasopharynx; Future             I spent 20 minutes caring for George on this date of service. This time includes time spent by me in the following activities:preparing for the visit, obtaining and/or reviewing a separately obtained history, performing a medically appropriate examination and/or evaluation , counseling and educating the patient/family/caregiver, ordering medications, tests, or  procedures, and documenting information in the medical record  Follow Up   Return if symptoms worsen or fail to improve.  Patient was given instructions and counseling regarding his condition or for health maintenance advice. Please see specific information pulled into the AVS if appropriate.

## 2022-08-16 NOTE — PATIENT INSTRUCTIONS
Order has been placed for SARS-CoV-2 (Coronavirus) test to be done at your nearest [facility]. You don't need to call the Urgent Care, just let them know when you arrive that you have been assessed by a Virtual Care Provider and that you have an order for testing. We will call with results when they are available. The results will be immediately released to your [employer] dotty and you will receive a message from [employer] to view your result. Since we are mandated to call all results to the patients, you will receive a call from a blocked number. If you do not answer, you will be sent a message after the first attempted call to notify you that your test results are available. Continue to treat your symptoms just as you would for any viral illness. Take Tylenol for pain and/or fever,  Stay hydrated, rest and treat cough with over-the-counter cough syrup such as Robitussin. You will need to Self Quarantine until the following criteria has been met: (Nonhealthcare workers only) If you are a HCW, follow your Employee Health recommendations  ?At least five days have passed since symptoms first appeared AND    ?At least one day (24 hours) has passed since resolution of fever without the use of fever-reducing medications AND     ?There is improvement in symptoms (eg, cough, shortness of breath)    After discontinuing home isolation, patients should continue to wear a well-fitting mask around others. The total duration of isolation plus strict masking is 10 days. During this time, patients should avoid people who are immunocompromised or at high risk for severe disease. Additional information on restrictions (eg, travel) after the five-day isolation period can be found on the CDC website.

## 2022-10-06 ENCOUNTER — HOSPITAL ENCOUNTER (EMERGENCY)
Facility: HOSPITAL | Age: 42
Discharge: HOME | End: 2022-10-06
Attending: EMERGENCY MEDICINE | Admitting: EMERGENCY MEDICINE

## 2022-10-06 VITALS
OXYGEN SATURATION: 96 % | TEMPERATURE: 98.2 F | RESPIRATION RATE: 18 BRPM | HEART RATE: 67 BPM | WEIGHT: 210.76 LBS | SYSTOLIC BLOOD PRESSURE: 124 MMHG | DIASTOLIC BLOOD PRESSURE: 84 MMHG | BODY MASS INDEX: 28.55 KG/M2 | HEIGHT: 72 IN

## 2022-10-06 DIAGNOSIS — M54.6 CHRONIC LEFT-SIDED THORACIC BACK PAIN: Primary | ICD-10-CM

## 2022-10-06 DIAGNOSIS — G89.29 CHRONIC LEFT-SIDED THORACIC BACK PAIN: Primary | ICD-10-CM

## 2022-10-06 PROCEDURE — 25010000002 ORPHENADRINE CITRATE PER 60 MG: Performed by: NURSE PRACTITIONER

## 2022-10-06 PROCEDURE — 96372 THER/PROPH/DIAG INJ SC/IM: CPT

## 2022-10-06 PROCEDURE — 99283 EMERGENCY DEPT VISIT LOW MDM: CPT

## 2022-10-06 PROCEDURE — 25010000002 KETOROLAC TROMETHAMINE PER 15 MG: Performed by: NURSE PRACTITIONER

## 2022-10-06 RX ORDER — KETOROLAC TROMETHAMINE 30 MG/ML
60 INJECTION, SOLUTION INTRAMUSCULAR; INTRAVENOUS ONCE
Status: COMPLETED | OUTPATIENT
Start: 2022-10-06 | End: 2022-10-06

## 2022-10-06 RX ORDER — ORPHENADRINE CITRATE 30 MG/ML
60 INJECTION INTRAMUSCULAR; INTRAVENOUS ONCE
Status: COMPLETED | OUTPATIENT
Start: 2022-10-06 | End: 2022-10-06

## 2022-10-06 RX ORDER — CYCLOBENZAPRINE HCL 10 MG
10 TABLET ORAL 3 TIMES DAILY PRN
Qty: 15 TABLET | Refills: 0 | Status: SHIPPED | OUTPATIENT
Start: 2022-10-06 | End: 2022-12-14

## 2022-10-06 RX ADMIN — ORPHENADRINE CITRATE 60 MG: 60 INJECTION INTRAMUSCULAR; INTRAVENOUS at 20:30

## 2022-10-06 RX ADMIN — KETOROLAC TROMETHAMINE 60 MG: 30 INJECTION, SOLUTION INTRAMUSCULAR at 20:27

## 2022-10-07 NOTE — ED PROVIDER NOTES
Time: 21:19 EDT  Arrived by: private vehicle  Chief Complaint: back pain/ htn today  History provided by: patient  History is limited by: N/A    History of Present Illness:  Patient is a 42 y.o. year old male that presents to the emergency department with back pain that is chronic    Patient concerned because today when he was at the chiropractor his blood pressure was 140s over 90s which is high for him so wanted to come get checked for that as well      History provided by:  Patient  Back Pain  Location:  Thoracic spine  Quality:  Aching (stinging)  Radiates to:  L shoulder  Pain severity:  Severe  Pain is:  Same all the time  Onset quality:  Gradual  Duration: years but worse for last few months.  Timing:  Constant  Progression:  Unchanged  Chronicity:  Chronic  Context comment:  Patient has pain in his left back around his shoulder blade for years that has constantly just gotten worse.  No initial known injury  Relieved by:  Nothing  Worsened by:  Palpation and movement  Ineffective treatments:  OTC medications  Associated symptoms: headaches and paresthesias ( At times left hand and arm go numb from back pain)    Associated symptoms: no abdominal pain, no abdominal swelling, no bladder incontinence, no bowel incontinence, no chest pain, no fever, no leg pain, no numbness, no pelvic pain, no perianal numbness, no tingling and no weakness    Hypertension  Associated symptoms: headaches    Associated symptoms: no abdominal pain, no chest pain, no ear pain, no fatigue, no fever, no neck pain, no shortness of breath, not vomiting and no weakness    Headache  Associated symptoms: back pain and paresthesias ( At times left hand and arm go numb from back pain)    Associated symptoms: no abdominal pain, no cough, no diarrhea, no ear pain, no fatigue, no fever, no myalgias, no neck pain, no numbness, no sore throat, no vomiting and no weakness        Similar Symptoms Previously: chronic back pain  Recently seen: seen by  chiropractor. Last visit today      Patient Care Team  Primary Care Provider: geo mcdonnell    Past Medical History:     Allergies   Allergen Reactions   • Penicillins Anaphylaxis     Childhood allergy     • Codeine Itching     Past Medical History:   Diagnosis Date   • Allergies    • Anxiety    • Broken bones    • Deafness    • Diabetes ([facility])    • Diabetes mellitus type I ([facility])    • Foot pain, bilateral 2018   • Fracture    • Hammertoe    • Head injury    • Hernia cerebri ([facility])    • Hypothyroidism    • Neuropathy in diabetes ([facility]) 2009   • Plantar fascial fibromatosis of both feet 2018   • Toe pain, right      Past Surgical History:   Procedure Laterality Date   • HERNIA REPAIR       Family History   Family history unknown: Yes       Home Medications:  Prior to Admission medications    Medication Sig Start Date End Date Taking? Authorizing Provider   cetirizine-pseudoephedrine (ZyrTEC-D) 5-120 MG per 12 hr tablet Take 1 tablet by mouth 2 (Two) Times a Day. 1/15/22   Annia Jackson MD   Euthyrox 25 MCG tablet Take 25 mcg by mouth Daily. 7/12/21   Provider, MD Rudolph   insulin aspart (NovoLOG FlexPen) 100 UNIT/ML solution pen-injector sc pen Inject 30 Units under the skin into the appropriate area as directed 3 (Three) Times a Day With Meals. 9/2/21   Indu Lloyd APRN   Insulin Glargine (BASAGLAR KWIKPEN) 100 UNIT/ML injection pen INJECT 40 UNITS SUBCUTANEOUSLY ONCE DAILY TO APPROPRIATE AREA AS DIRECTED 4/27/22   Indu Lloyd APRN   lisinopril (Zestril) 2.5 MG tablet Take 1 tablet by mouth Daily. 11/11/21   Indu Lloyd APRN   omeprazole (priLOSEC) 40 MG capsule Take 1 capsule by mouth Daily. before a meal 7/5/22   Jak Mcdonnell APRN   ondansetron ODT (Zofran ODT) 8 MG disintegrating tablet Place 1 tablet on the tongue Every 8 (Eight) Hours As Needed for Nausea or Vomiting. 7/14/22   Lori Reddy APRN   simvastatin (ZOCOR) 10 MG tablet Take 1 tablet by mouth Every Night. 11/11/21    "Gabino InduMALCOM Diaz        Social History:   PT  reports that he has quit smoking. His smoking use included cigarettes, cigars, cigarettes, and cigars. He has a 5.00 pack-year smoking history. He has never used smokeless tobacco. He reports previous alcohol use. He reports that he does not use drugs.    Record Review:  I have reviewed the patient's records in [facility].     Review of Systems  Review of Systems   Constitutional: Negative for chills, fatigue and fever.   HENT: Negative for ear pain, rhinorrhea and sore throat.    Eyes: Negative for visual disturbance.   Respiratory: Negative for cough and shortness of breath.    Cardiovascular: Negative for chest pain.   Gastrointestinal: Negative for abdominal pain, bowel incontinence, diarrhea and vomiting.   Genitourinary: Negative for bladder incontinence, difficulty urinating, flank pain and pelvic pain.   Musculoskeletal: Positive for back pain. Negative for arthralgias, myalgias and neck pain.   Skin: Negative for rash.   Neurological: Positive for headaches and paresthesias ( At times left hand and arm go numb from back pain). Negative for tingling, weakness, light-headedness and numbness.   Hematological: Negative for adenopathy.   Psychiatric/Behavioral: Negative.         Physical Exam  /92   Pulse 66   Temp 98.2 °F (36.8 °C) (Oral)   Resp 18   Ht 182.9 cm (72\")   Wt 95.6 kg (210 lb 12.2 oz)   SpO2 97%   BMI 28.58 kg/m²     Physical Exam  Vitals and nursing note reviewed.   Constitutional:       General: He is not in acute distress.     Appearance: Normal appearance. He is not toxic-appearing.   HENT:      Head: Normocephalic and atraumatic.      Nose: Nose normal.      Mouth/Throat:      Mouth: Mucous membranes are moist.   Eyes:      Conjunctiva/sclera: Conjunctivae normal.   Cardiovascular:      Rate and Rhythm: Normal rate and regular rhythm.      Pulses: Normal pulses.      Heart sounds: Normal heart sounds.   Pulmonary:      Effort: " "Pulmonary effort is normal.      Breath sounds: Normal breath sounds.   Abdominal:      General: Bowel sounds are normal.      Palpations: Abdomen is soft.      Tenderness: There is no abdominal tenderness.   Musculoskeletal:         General: Tenderness ( Left side back pain subscapular) present. Normal range of motion.      Cervical back: Normal range of motion.   Skin:     General: Skin is warm and dry.   Neurological:      General: No focal deficit present.      Mental Status: He is alert and oriented to person, place, and time.   Psychiatric:         Mood and Affect: Mood normal.         Behavior: Behavior normal.         Thought Content: Thought content normal.         Judgment: Judgment normal.          ED Course  /92   Pulse 66   Temp 98.2 °F (36.8 °C) (Oral)   Resp 18   Ht 182.9 cm (72\")   Wt 95.6 kg (210 lb 12.2 oz)   SpO2 97%   BMI 28.58 kg/m²   Results for orders placed or performed during the hospital encounter of 08/16/22   COVID-19,[facility]TIMA PANTHER(LAKSHMI),[facility], NP/OP SWAB IN UTM/VTM/[facility] TRANSPORT MEDIA,24 HR TAT - Swab, Nasopharynx    Specimen: Nasopharynx; Swab   Result Value Ref Range    COVID19 Not Detected Not Detected - Ref. Range     Medications   orphenadrine (NORFLEX) injection 60 mg (60 mg Intramuscular Given 10/6/22 2030)   ketorolac (TORADOL) injection 60 mg (60 mg Intramuscular Given 10/6/22 2027)     No results found.      Medical Decision Making:                     MDM  Number of Diagnoses or Management Options  Chronic left-sided thoracic back pain  Diagnosis management comments: The patient´s symptoms are consistent with musculoskeletal back pain. The patient is now resting comfortably, feels better, is alert, talkative, interactive and in no distress. The repeat examination is unremarkable and benign. The patient is neurologically intact and is ambulatory in the ED. The patient has no fever, no bowel or bladder incontinence, no saddle anesthesia, and is otherwise " alert and well appearing. The history, physical exam, and diagnostics (if any) do not suggest the presence of acute spinal epidural abscess, acute spinal epidural bleed, cauda equina syndrome, abdominal aortic aneurysm, aortic dissection or other process requiring further testing, treatment or consultation in the emergency department. The vital signs have been stable. The patient's condition is stable and appropriate for discharge. The patient will pursue further outpatient evaluation with the primary care physician or other designated for consulting position as indicated in the discharge instructions.       Amount and/or Complexity of Data Reviewed  Tests in the medicine section of CPT®: ordered and reviewed    Risk of Complications, Morbidity, and/or Mortality  Presenting problems: minimal  Diagnostic procedures: low  Management options: minimal    Patient Progress  Patient progress: stable       Final diagnoses:   Chronic left-sided thoracic back pain        Disposition:  ED Disposition     ED Disposition   Discharge    Condition   Stable    Comment   --              Maria Luisa Cameron, APRN  10/06/22 0615

## 2022-10-07 NOTE — DISCHARGE INSTRUCTIONS
Ice or moist heat as desired to back for comfort.    Blood pressure readings were not emergently high in the department and by the time of discharge were normal.  Follow-up with your PCP for any additional concerns.  Take random blood pressure readings for the next 2 weeks to keep a record for him to review.    Take medications as prescribed for symptomatic treatment.

## 2022-10-10 ENCOUNTER — OFFICE VISIT (OUTPATIENT)
Dept: PODIATRY | Facility: CLINIC | Age: 42
End: 2022-10-10

## 2022-10-10 VITALS
HEART RATE: 83 BPM | HEIGHT: 72 IN | DIASTOLIC BLOOD PRESSURE: 74 MMHG | TEMPERATURE: 97.5 F | OXYGEN SATURATION: 96 % | BODY MASS INDEX: 28.58 KG/M2 | WEIGHT: 211 LBS | SYSTOLIC BLOOD PRESSURE: 129 MMHG

## 2022-10-10 DIAGNOSIS — E11.9 TYPE 2 DIABETES MELLITUS WITHOUT COMPLICATION, WITH LONG-TERM CURRENT USE OF INSULIN: Primary | ICD-10-CM

## 2022-10-10 DIAGNOSIS — Z79.4 TYPE 2 DIABETES MELLITUS WITHOUT COMPLICATION, WITH LONG-TERM CURRENT USE OF INSULIN: Primary | ICD-10-CM

## 2022-10-10 PROCEDURE — 99213 OFFICE O/P EST LOW 20 MIN: CPT | Performed by: PODIATRIST

## 2022-10-10 NOTE — PROGRESS NOTES
[facility] - PODIATRY    Today's Date: 10/10/22    Patient Name: George Hunter  MRN: [id number]  CSN: [id number]  PCP: Jak Colindres APRN, Last PCP Visit: 5 July 2022  Referring Provider: No ref. provider found    SUBJECTIVE     Chief Complaint   Patient presents with   • Left Foot - Diabetes, Annual Exam, Follow-up   • Right Foot - Follow-up, Diabetes, Annual Exam     HPI: George Hunter, a 42 y.o.male, presents to clinic for a diabetic foot evaluation.    New, Established, New Problem: Established    Duration:  2000    Onset:  insidious    Nature:  IDDM    Stable, worsening, improving:  improving    Patient controlling diabetes via:  insulin    Patient denies any fevers, chills, nausea, vomiting, shortness of breath, nor any other constitutional signs nor symptoms.    Medical changes:  None.    Patient states their most recent blood glucose reading was 102.    Past Medical History:   Diagnosis Date   • Allergies    • Anxiety    • Broken bones    • Deafness    • Diabetes ([facility])    • Diabetes mellitus type I ([facility])    • Foot pain, bilateral 2018   • Fracture    • Hammertoe    • Head injury    • Hernia cerebri ([facility])    • Hypothyroidism    • Neuropathy in diabetes ([facility]) 2009   • Plantar fascial fibromatosis of both feet 2018   • Toe pain, right      Past Surgical History:   Procedure Laterality Date   • HERNIA REPAIR     • TOENAIL EXCISION  2021     Family History   Family history unknown: Yes     Social History     Socioeconomic History   • Marital status:    • Number of children: 1   Tobacco Use   • Smoking status: Former     Packs/day: 0.50     Years: 10.00     Pack years: 5.00     Types: Cigarettes, Cigars   • Smokeless tobacco: Never   Vaping Use   • Vaping Use: Never used   Substance and Sexual Activity   • Alcohol use: Not Currently   • Drug use: Never   • Sexual activity: Yes     Partners: Female     Allergies   Allergen Reactions   • Penicillins Anaphylaxis     Childhood allergy     •  Codeine Itching     Current Outpatient Medications   Medication Sig Dispense Refill   • cetirizine-pseudoephedrine (ZyrTEC-D) 5-120 MG per 12 hr tablet Take 1 tablet by mouth 2 (Two) Times a Day. 24 tablet 0   • cyclobenzaprine (FLEXERIL) 10 MG tablet Take 1 tablet by mouth 3 (Three) Times a Day As Needed for Muscle Spasms. 15 tablet 0   • diclofenac (VOLTAREN) 50 MG EC tablet Take 1 tablet by mouth 3 (Three) Times a Day As Needed (Pain). 30 tablet 0   • Euthyrox 25 MCG tablet Take 25 mcg by mouth Daily.     • insulin aspart (NovoLOG FlexPen) 100 UNIT/ML solution pen-injector sc pen Inject 30 Units under the skin into the appropriate area as directed 3 (Three) Times a Day With Meals. 9 pen 5   • Insulin Glargine (BASAGLAR KWIKPEN) 100 UNIT/ML injection pen INJECT 40 UNITS SUBCUTANEOUSLY ONCE DAILY TO APPROPRIATE AREA AS DIRECTED 15 mL 3   • lisinopril (Zestril) 2.5 MG tablet Take 1 tablet by mouth Daily. 30 tablet 3   • omeprazole (priLOSEC) 40 MG capsule Take 1 capsule by mouth Daily. before a meal 90 capsule 0   • ondansetron ODT (Zofran ODT) 8 MG disintegrating tablet Place 1 tablet on the tongue Every 8 (Eight) Hours As Needed for Nausea or Vomiting. 15 tablet 0   • simvastatin (ZOCOR) 10 MG tablet Take 1 tablet by mouth Every Night. 30 tablet 3     No current facility-administered medications for this visit.     Review of Systems   Constitutional: Negative.    All other systems reviewed and are negative.      OBJECTIVE     Vitals:    10/10/22 0740   BP: 129/74   Pulse: 83   Temp: 97.5 °F (36.4 °C)   SpO2: 96%       Body mass index is 28.62 kg/m².    Lab Results   Component Value Date    HGBA1C 7.7 07/07/2022       Lab Results   Component Value Date    GLUCOSE 72 06/01/2022    CALCIUM 9.6 06/01/2022     06/01/2022    K 4.1 06/01/2022    CO2 24.7 06/01/2022     06/01/2022    BUN 7 06/01/2022    CREATININE 0.82 06/01/2022    EGFRIFNONA 94 02/01/2022    BCR 8.5 06/01/2022    ANIONGAP 9.3 06/01/2022        Patient seen in no apparent distress.      PHYSICAL EXAM:     Foot/Ankle Exam:       General:   Diabetic Foot Exam Performed    Appearance: appears stated age and healthy    Orientation: AAOx3    Affect: appropriate    Gait: unimpaired    Shoe Gear:  Casual shoes    VASCULAR      Right Foot Vascularity   Normal vascular exam    Dorsalis pedis:  2+  Posterior tibial:  2+  Skin Temperature: warm    Edema Grading:  None  CFT:  < 3 seconds  Pedal Hair Growth:  Present  Varicosities: none       Left Foot Vascularity   Normal vascular exam    Dorsalis pedis:  2+  Posterior tibial:  2+  Skin Temperature: warm    Edema Grading:  None  CFT:  < 3 seconds  Pedal Hair Growth:  Present  Varicosities: none        NEUROLOGIC     Right Foot Neurologic   Normal sensation    Light touch sensation:  Normal  Vibratory sensation:  Normal  Hot/Cold sensation: normal    Protective Sensation using [place]-Cheryl Monofilament:  10     Left Foot Neurologic   Normal sensation    Light touch sensation:  Normal  Vibratory sensation:  Normal  Hot/cold sensation: normal    Protective Sensation using [place]-Cheryl Monofilament:  10     MUSCULOSKELETAL      Right Foot Musculoskeletal   Arch:  Pes cavus     Left Foot Musculoskeletal   Arch:  Pes cavus     MUSCLE STRENGTH     Right Foot Muscle Strength   Foot dorsiflexion:  4  Foot plantar flexion:  4  Foot inversion:  4  Foot eversion:  4     Left Foot Muscle Strength   Foot dorsiflexion:  4  Foot plantar flexion:  4  Foot inversion:  4  Foot eversion:  4     RANGE OF MOTION      Right Foot Range of Motion   Foot and ankle ROM within normal limits       Left Foot Range of Motion   Foot and ankle ROM within normal limits       DERMATOLOGIC     Right Foot Dermatologic   Skin: skin intact    Nails: normal       Left Foot Dermatologic   Skin: skin intact    Nails: normal        Diabetic Foot Exam Performed      ASSESSMENT/PLAN     Diagnoses and all orders for this visit:    1. Type 2 diabetes  mellitus without complication, with long-term current use of insulin (HCC) (Primary)        Comprehensive lower extremity examination and evaluation was performed.    Discussed findings and treatment plan including risks, benefits, and treatment options with patient in detail. Patient agreed with treatment plan.    Diabetic foot exam performed and documented this date, compliant with CQM required standards. Detail of findings as noted in physical exam.  Lower extremity Neurologic exam for diabetic patient performed and documented this date, compliant with PQRS required standards. Detail of findings as noted in physical exam.  Advised patient importance of good routine lower extremity hygiene. Advised patient importance of evaluating for intact skin and pain free nail borders.  Advised patient to use mirror to evaluate plantar/ soles of feet for better visualization. Advised patient monitor and phone office to be seen if any cracking to skin, open lesions, painful nail borders or if nails become elongated prior to next visit. Advised patient importance of daily cleansing of lower extremities, followed by good skin cream to maintain normal hydration of skin. Also advised patient importance of close daily monitoring of blood sugar. Advised to regulate diet and medications to maintain control of blood sugar in optimal range. Contact primary care provider if difficulties maintaining blood sugar levels.  Advised Patient of presence of Diabetes Mellitus condition.  Advised Patient risk of progression and worsening or improvement, then return of condition.  Will monitor condition for any change in future. Treat with most appropriate treatment pending status of condition.  Counseled and advised patient extensively on nature and ramifications of diabetes. Standard instructions given to patient for good diabetic foot care and maintenance. Advised importance of careful monitoring to avoid break down and complications secondary  to diabetes. Advised patient importance of strict maintenance of blood sugar control. Advised patient of possible ominous results from neglect of condition, i.e.: amputation/ loss of digits, feet and legs, or even death.  Patient states understands counseling, will monitor closely, continue good hygiene and routine diabetic foot care. Patient will contact office is questions or problems.      An After Visit Summary was printed and given to the patient at discharge, including (if requested) any available informative/educational handouts regarding diagnosis, treatment, or medications. All questions were answered to patient/family satisfaction. Should symptoms fail to improve or worsen they agree to call or return to clinic or to go to the Emergency Department. Discussed the importance of following up with any needed screening tests/labs/specialist appointments and any requested follow-up recommended by me today. Importance of maintaining follow-up discussed and patient accepts that missed appointments can delay diagnosis and potentially lead to worsening of conditions.    Return in about 1 year (around 10/10/2023) for Podiatry Diabetic Foot Exam., or sooner if acute issues arise.    This document has been electronically signed by Jayy Buitrago DPM on October 10, 2022 08:00 EDT

## 2022-10-12 NOTE — PROGRESS NOTES
Chief Complaint  Diabetes (3 month follow up, med mgt )    Referred By: MALCOM Farias presents to [facility] for diabetes medication management    History of Present Illness    Visit type:  follow-up  Diabetes type:  Type 1  Current diabetes status/concerns/issues:  No concerns specific to his diabetes today.  He does express an interest in a continuous glucose sensor  Other health concerns: Chronic mid back pain; seen in the ER on 10/6/22.  He states he did not tolerate the antidepressant that was prescribed for him.  He states it made him grind his teeth and he felt tense while he was taking it so he discontinued the medication  Diabetes symptoms:    Polyuria: No   Polydipsia: No   Polyphagia: No   Blurred vision: No   Excessive fatigue: No   Diabetes complications:   Neuro: None  Renal: None  Eyes: None  Amputation/Wounds: None  GI: None  Cardiovascular: None  ED: None   Other: None  Hypoglycemia:  None reported at this time  Hypoglycemia Symptoms:  No hypoglycemia at this time  Current diabetes treatment:  Basaglar 40 units daily and Humalog using a 1-10 carbohydrate ratio to 1-20 correction for glucose levels greater than 120 mg/dL  Blood glucose device:  Meter  Blood glucose monitoring frequency:  3  Blood glucose range/average:  130-140 recently otherwise 110-125  Diet:  Limits high carb/sweet foods, Avoids sugary drinks  Activity/Exercise:  does a lot of walking at work    Past Medical History:   Diagnosis Date   • Allergies    • Anxiety    • Back injury    • Broken bones    • Deafness    • Diabetes ([facility])    • Diabetes mellitus type I ([facility])    • Foot pain, bilateral 2018   • Fracture    • Hammertoe    • Head injury    • Hernia cerebri ([facility])    • Hypothyroidism    • Neuropathy in diabetes ([facility]) 2009   • Plantar fascial fibromatosis of both feet 2018   • Toe pain, right      Past Surgical History:   Procedure Laterality Date   • HERNIA  REPAIR     • TOENAIL EXCISION  2021     Family History   Family history unknown: Yes     Social History     Socioeconomic History   • Marital status:    • Number of children: 1   Tobacco Use   • Smoking status: Former     Packs/day: 0.50     Years: 10.00     Pack years: 5.00     Types: Cigarettes, Cigars   • Smokeless tobacco: Never   Vaping Use   • Vaping Use: Never used   Substance and Sexual Activity   • Alcohol use: Not Currently   • Drug use: Never   • Sexual activity: Yes     Partners: Female     Allergies   Allergen Reactions   • Penicillins Anaphylaxis     Childhood allergy     • Codeine Itching       Current Outpatient Medications:   •  cetirizine-pseudoephedrine (ZyrTEC-D) 5-120 MG per 12 hr tablet, Take 1 tablet by mouth 2 (Two) Times a Day., Disp: 24 tablet, Rfl: 0  •  cyclobenzaprine (FLEXERIL) 10 MG tablet, Take 1 tablet by mouth 3 (Three) Times a Day As Needed for Muscle Spasms., Disp: 15 tablet, Rfl: 0  •  diclofenac (VOLTAREN) 50 MG EC tablet, Take 1 tablet by mouth 3 (Three) Times a Day As Needed (Pain)., Disp: 30 tablet, Rfl: 0  •  Euthyrox 25 MCG tablet, Take 25 mcg by mouth Daily., Disp: , Rfl:   •  insulin aspart (NovoLOG FlexPen) 100 UNIT/ML solution pen-injector sc pen, Inject 30 Units under the skin into the appropriate area as directed 3 (Three) Times a Day With Meals., Disp: 9 pen, Rfl: 5  •  Insulin Glargine (BASAGLAR KWIKPEN) 100 UNIT/ML injection pen, INJECT 40 UNITS SUBCUTANEOUSLY ONCE DAILY TO APPROPRIATE AREA AS DIRECTED, Disp: 15 mL, Rfl: 3  •  lisinopril (Zestril) 2.5 MG tablet, Take 1 tablet by mouth Daily., Disp: 30 tablet, Rfl: 3  •  omeprazole (priLOSEC) 40 MG capsule, Take 1 capsule by mouth Daily. before a meal, Disp: 90 capsule, Rfl: 0  •  ondansetron ODT (Zofran ODT) 8 MG disintegrating tablet, Place 1 tablet on the tongue Every 8 (Eight) Hours As Needed for Nausea or Vomiting., Disp: 15 tablet, Rfl: 0  •  simvastatin (ZOCOR) 10 MG tablet, Take 1 tablet by mouth  "Every Night., Disp: 30 tablet, Rfl: 3  •  Continuous Blood Gluc Sensor (FreeStyle Nadine 3 Sensor) misc, 1 each Every 14 (Fourteen) Days., Disp: 2 each, Rfl: 5    Review of Systems   Constitutional: Negative for activity change, appetite change, fatigue, fever, unexpected weight gain and unexpected weight loss.   HENT: Negative for congestion, ear pain, facial swelling, hearing loss, sore throat and tinnitus.    Eyes: Negative for blurred vision, double vision, redness and visual disturbance.   Respiratory: Negative for cough, shortness of breath and wheezing.    Cardiovascular: Negative for chest pain, palpitations and leg swelling.   Gastrointestinal: Negative for abdominal distention, constipation, diarrhea, nausea, vomiting, GERD and indigestion.   Endocrine: Negative for polydipsia, polyphagia and polyuria.   Genitourinary: Negative for difficulty urinating, frequency and urgency.   Musculoskeletal: Positive for arthralgias, back pain and myalgias. Negative for gait problem.   Skin: Negative for rash, skin lesions and wound.   Neurological: Positive for memory problem. Negative for seizures, speech difficulty, weakness, headache and confusion.   Psychiatric/Behavioral: Positive for stress. Negative for sleep disturbance and depressed mood. The patient is nervous/anxious.         Objective     Vitals:    10/13/22 0837   BP: 126/86   BP Location: Left arm   Patient Position: Sitting   Cuff Size: Adult   Pulse: 77   Temp: 98.1 °F (36.7 °C)   SpO2: 96%   Weight: 94.8 kg (208 lb 15.9 oz)   Height: 185.4 cm (73\")   PainSc:   5     Body mass index is 27.57 kg/m².    Physical Exam  Constitutional:       Appearance: Normal appearance.      Comments: Overweight with BMI of 27.57   HENT:      Head: Normocephalic and atraumatic.      Right Ear: External ear normal.      Left Ear: External ear normal.      Nose: Nose normal.   Eyes:      Extraocular Movements: Extraocular movements intact.      Conjunctiva/sclera: " Conjunctivae normal.   Pulmonary:      Effort: Pulmonary effort is normal.   Musculoskeletal:         General: Normal range of motion.      Cervical back: Normal range of motion.   Skin:     General: Skin is warm and dry.   Neurological:      General: No focal deficit present.      Mental Status: He is alert and oriented to person, place, and time. Mental status is at baseline.   Psychiatric:         Mood and Affect: Mood normal.         Behavior: Behavior normal.         Thought Content: Thought content normal.         Judgment: Judgment normal.         Result Review :   The following data was reviewed by: MALCOM Silverman on 10/13/2022:    Most Recent A1C    HGBA1C Most Recent 10/13/22   Hemoglobin A1C 8.4             A1C Last 3 Results    HGBA1C Last 3 Results 3/31/22 7/7/22 10/13/22   Hemoglobin A1C 8 7.7 8.4           Point of care A1c in the office today is 8.4% indicating uncontrolled type 1 diabetes.  This is up from the prior result of 7.7% collected in July of this year    Glucose   Date Value Ref Range Status   10/13/2022 97 70 - 99 mg/dL Final     Comment:     Serial Number: [id number]:  [id number]     Point-of-care glucose level in the office today is within normal limits       {CC Problem List  Visit Diagnosis  ROS  Review (Popup)  Health Maintenance  Quality  BestPractice  Medications  SmartSets  SnapShot Encounters  Media :23}     Assessment: He has had an increase in his A1c.  He denies any specific changes to his diet or activity.  He states he is having more stress recently and he has had a lot of of back pain as well.      Diagnoses and all orders for this visit:    1. Uncontrolled type 1 diabetes mellitus with hyperglycemia (HCC) (Primary)  -     POC Glucose  -     POC Glycosylated Hemoglobin (Hb A1C)  -     Continuous Blood Gluc Sensor (FreeStyle Nadine 3 Sensor) misc; 1 each Every 14 (Fourteen) Days.  Dispense: 2 each; Refill: 5    2. Overweight (BMI  25.0-29.9)        Plan: He was instructed to increase his Basaglar insulin to 45 units each day.  He will continue taking the Humalog as previously prescribed.  If he continues to struggle with high glucose levels he will contact our office so additional changes can be made.    The patient is requesting a continuous glucose sensor.  A sample of the neal 3 device was provided to the patient and applied to his left upper arm during the visit.  A prescription was sent to his pharmacy.    The patient will monitor his blood glucose levels using the continuous glucose sensor.  If he develops problematic hyperglycemia or hypoglycemia or adverse drug reactions, he will contact the office for further instructions.        Follow Up     Return in about 3 months (around 1/13/2023) for Medication Management.    Patient was given instructions and counseling regarding his condition or for health maintenance advice. Please see specific information pulled into the AVS if appropriate.     Indu Lloyd, MALCOM  10/13/2022      Dictated Utilizing Dragon Dictation.  Please note that portions of this note were completed with a voice recognition program.  Part of this note may be an electronic transcription/translation of spoken language to printed text using the Dragon Dictation System.

## 2022-10-13 ENCOUNTER — OFFICE VISIT (OUTPATIENT)
Dept: DIABETES SERVICES | Facility: HOSPITAL | Age: 42
End: 2022-10-13

## 2022-10-13 VITALS
BODY MASS INDEX: 27.7 KG/M2 | TEMPERATURE: 98.1 F | WEIGHT: 209 LBS | HEIGHT: 73 IN | DIASTOLIC BLOOD PRESSURE: 86 MMHG | HEART RATE: 77 BPM | OXYGEN SATURATION: 96 % | SYSTOLIC BLOOD PRESSURE: 126 MMHG

## 2022-10-13 DIAGNOSIS — E10.65 UNCONTROLLED TYPE 1 DIABETES MELLITUS WITH HYPERGLYCEMIA: Primary | ICD-10-CM

## 2022-10-13 DIAGNOSIS — E66.3 OVERWEIGHT (BMI 25.0-29.9): ICD-10-CM

## 2022-10-13 LAB
EXPIRATION DATE: ABNORMAL
GLUCOSE BLDC GLUCOMTR-MCNC: 97 MG/DL (ref 70–99)
HBA1C MFR BLD: 8.4 %
Lab: ABNORMAL

## 2022-10-13 PROCEDURE — G0463 HOSPITAL OUTPT CLINIC VISIT: HCPCS | Performed by: NURSE PRACTITIONER

## 2022-10-13 PROCEDURE — 82962 GLUCOSE BLOOD TEST: CPT | Performed by: NURSE PRACTITIONER

## 2022-10-13 PROCEDURE — 99214 OFFICE O/P EST MOD 30 MIN: CPT | Performed by: NURSE PRACTITIONER

## 2022-10-13 PROCEDURE — 83036 HEMOGLOBIN GLYCOSYLATED A1C: CPT | Performed by: NURSE PRACTITIONER

## 2022-10-13 RX ORDER — BLOOD-GLUCOSE SENSOR
1 EACH MISCELLANEOUS
Qty: 2 EACH | Refills: 5 | Status: SHIPPED | OUTPATIENT
Start: 2022-10-13

## 2022-10-16 RX ORDER — INSULIN GLARGINE 100 [IU]/ML
INJECTION, SOLUTION SUBCUTANEOUS
Qty: 15 ML | Refills: 2 | Status: SHIPPED | OUTPATIENT
Start: 2022-10-16 | End: 2023-02-09

## 2022-10-16 RX ORDER — INSULIN ASPART 100 [IU]/ML
INJECTION, SOLUTION INTRAVENOUS; SUBCUTANEOUS
Qty: 12 ML | Refills: 2 | Status: SHIPPED | OUTPATIENT
Start: 2022-10-16 | End: 2023-03-06

## 2022-11-14 RX ORDER — OMEPRAZOLE 40 MG/1
CAPSULE, DELAYED RELEASE ORAL
Qty: 90 CAPSULE | Refills: 0 | Status: SHIPPED | OUTPATIENT
Start: 2022-11-14 | End: 2023-02-24

## 2022-11-23 ENCOUNTER — OFFICE VISIT (OUTPATIENT)
Dept: FAMILY MEDICINE CLINIC | Facility: CLINIC | Age: 42
End: 2022-11-23

## 2022-11-23 VITALS
BODY MASS INDEX: 28.63 KG/M2 | HEART RATE: 82 BPM | HEIGHT: 73 IN | DIASTOLIC BLOOD PRESSURE: 72 MMHG | SYSTOLIC BLOOD PRESSURE: 118 MMHG | WEIGHT: 216 LBS | OXYGEN SATURATION: 100 % | TEMPERATURE: 96.5 F

## 2022-11-23 DIAGNOSIS — Z23 NEED FOR INFLUENZA VACCINATION: Primary | ICD-10-CM

## 2022-11-23 DIAGNOSIS — E03.9 HYPOTHYROIDISM, UNSPECIFIED TYPE: ICD-10-CM

## 2022-11-23 DIAGNOSIS — E10.65 TYPE 1 DIABETES MELLITUS WITH HYPERGLYCEMIA: ICD-10-CM

## 2022-11-23 DIAGNOSIS — Z00.00 WELL ADULT EXAM: ICD-10-CM

## 2022-11-23 DIAGNOSIS — S93.492A SPRAIN OF ANTERIOR TALOFIBULAR LIGAMENT OF LEFT ANKLE, INITIAL ENCOUNTER: ICD-10-CM

## 2022-11-23 PROCEDURE — 90686 IIV4 VACC NO PRSV 0.5 ML IM: CPT | Performed by: NURSE PRACTITIONER

## 2022-11-23 PROCEDURE — 99396 PREV VISIT EST AGE 40-64: CPT | Performed by: NURSE PRACTITIONER

## 2022-11-23 PROCEDURE — 90471 IMMUNIZATION ADMIN: CPT | Performed by: NURSE PRACTITIONER

## 2022-11-23 NOTE — PROGRESS NOTES
"Chief Complaint  Annual Exam    Subjective         George Hunter presents to [facility]  Presents to the office today for wellness physical.  Patient states that he did roll his ankle this morning stepped off the porch.  States that he cannot take time off from work and states that he will take ibuprofen.  Patient is due for routine lab work.  He does see endocrinology for his type 1 diabetes as well as his hypothyroidism.  Blood pressure is 118/72.  Denies any chest pain shortness breath palpitations time. patient does request a flu shot today.       Objective     Vitals:    11/23/22 0709   BP: 118/72   BP Location: Right arm   Patient Position: Sitting   Cuff Size: Adult   Pulse: 82   Temp: 96.5 °F (35.8 °C)   TempSrc: Temporal   SpO2: 100%   Weight: 98 kg (216 lb)   Height: 185.4 cm (73\")      Body mass index is 28.5 kg/m².    BMI is >= 25 and <30. (Overweight) The following options were offered after discussion;: nutrition counseling/recommendations        Physical Exam  Vitals reviewed.   Constitutional:       Appearance: Normal appearance.   HENT:      Head: Normocephalic and atraumatic.      Right Ear: Tympanic membrane, ear canal and external ear normal.      Left Ear: Tympanic membrane, ear canal and external ear normal.      Nose: Nose normal.      Mouth/Throat:      Mouth: Mucous membranes are moist.      Pharynx: Oropharynx is clear.   Eyes:      Extraocular Movements: Extraocular movements intact.      Conjunctiva/sclera: Conjunctivae normal.      Pupils: Pupils are equal, round, and reactive to light.   Cardiovascular:      Rate and Rhythm: Normal rate and regular rhythm.      Pulses: Normal pulses.      Heart sounds: Normal heart sounds, S1 normal and S2 normal. No murmur heard.  Pulmonary:      Effort: Pulmonary effort is normal. No respiratory distress.      Breath sounds: Normal breath sounds.   Abdominal:      General: Abdomen is flat. Bowel sounds are normal.     "  Palpations: Abdomen is soft.   Musculoskeletal:         General: Normal range of motion.      Cervical back: Normal range of motion and neck supple.      Left ankle: Swelling present. Tenderness present over the ATF ligament.   Skin:     General: Skin is warm and dry.   Neurological:      General: No focal deficit present.      Mental Status: He is alert and oriented to person, place, and time.   Psychiatric:         Attention and Perception: Attention normal.         Mood and Affect: Mood normal.         Behavior: Behavior normal.          Result Review :   The following data was reviewed by: MALCOM Farias on 11/23/2022:      Procedures    Assessment and Plan   Diagnoses and all orders for this visit:    1. Need for influenza vaccination (Primary)  -     Cancel: FluLaval/Fluarix/Fluzone >6 Months  -     FluLaval/Fluzone >6 mos ([id number])    2. Well adult exam  -     CBC & Differential; Future  -     Comprehensive Metabolic Panel; Future  -     Hemoglobin A1c; Future  -     Lipid Panel; Future  -     MicroAlbumin, Urine, Random - Urine, Clean Catch; Future  -     TSH; Future    3. Sprain of anterior talofibular ligament of left ankle, initial encounter    4. Hypothyroidism, unspecified type  -     TSH; Future    5. Type 1 diabetes mellitus with hyperglycemia (HCC)  -     Hemoglobin A1c; Future  -     MicroAlbumin, Urine, Random - Urine, Clean Catch; Future      Preventative counseling includes healthy diet and exercise.    Follow Up   Return in about 6 months (around 5/23/2023) for Recheck.  Patient was given instructions and counseling regarding his condition or for health maintenance advice. Please see specific information pulled into the AVS if appropriate.

## 2022-12-05 ENCOUNTER — TELEMEDICINE (OUTPATIENT)
Dept: FAMILY MEDICINE CLINIC | Facility: TELEHEALTH | Age: 42
End: 2022-12-05

## 2022-12-05 DIAGNOSIS — J06.9 VIRAL URI: Primary | ICD-10-CM

## 2022-12-05 DIAGNOSIS — H92.13 EAR DRAINAGE, BILATERAL: ICD-10-CM

## 2022-12-05 PROCEDURE — 99213 OFFICE O/P EST LOW 20 MIN: CPT | Performed by: NURSE PRACTITIONER

## 2022-12-05 RX ORDER — BROMPHENIRAMINE MALEATE, PSEUDOEPHEDRINE HYDROCHLORIDE, AND DEXTROMETHORPHAN HYDROBROMIDE 2; 30; 10 MG/5ML; MG/5ML; MG/5ML
5 SYRUP ORAL 4 TIMES DAILY PRN
Qty: 118 ML | Refills: 0 | Status: SHIPPED | OUTPATIENT
Start: 2022-12-05 | End: 2022-12-14

## 2022-12-05 RX ORDER — CIPROFLOXACIN AND DEXAMETHASONE 3; 1 MG/ML; MG/ML
4 SUSPENSION/ DROPS AURICULAR (OTIC) 2 TIMES DAILY
Qty: 7.5 ML | Refills: 0 | Status: SHIPPED | OUTPATIENT
Start: 2022-12-05 | End: 2022-12-12

## 2022-12-05 RX ORDER — FLUTICASONE PROPIONATE 50 MCG
2 SPRAY, SUSPENSION (ML) NASAL DAILY
Qty: 16 G | Refills: 0 | Status: SHIPPED | OUTPATIENT
Start: 2022-12-05

## 2022-12-05 NOTE — PROGRESS NOTES
Subjective   George Hunter is a 42 y.o. male.     History of Present Illness  He has dried up blood in his ears. He has a sensation of fluid in his ears, congestion. His symptoms started 12/3. He said this has happened so him before when he has had a sinus/ear infection. He has tried sudafed and tylenol.        The following portions of the patient's history were reviewed and updated as appropriate: allergies, current medications, past family history, past medical history, past social history, past surgical history, and problem list.    Review of Systems   Constitutional: Negative for fever.   HENT: Positive for ear discharge (dried blood), ear pain (pain comes and goes), rhinorrhea, sinus pressure and sore throat. Negative for congestion.    Respiratory: Negative for cough, shortness of breath and wheezing.    Gastrointestinal: Negative.    Musculoskeletal: Negative for myalgias.   Neurological: Positive for headache.       Objective   Physical Exam  Constitutional:       General: He is not in acute distress.     Appearance: He is well-developed. He is not diaphoretic.   HENT:      Nose:      Right Sinus: No maxillary sinus tenderness or frontal sinus tenderness.      Left Sinus: No maxillary sinus tenderness or frontal sinus tenderness.   Pulmonary:      Effort: Pulmonary effort is normal.   Neurological:      Mental Status: He is alert and oriented to person, place, and time.   Psychiatric:         Behavior: Behavior normal.           Assessment & Plan   Diagnoses and all orders for this visit:    1. Viral URI (Primary)  -     brompheniramine-pseudoephedrine-DM 30-2-10 MG/5ML syrup; Take 5 mL by mouth 4 (Four) Times a Day As Needed for Congestion or Cough.  Dispense: 118 mL; Refill: 0  -     fluticasone (Flonase) 50 MCG/ACT nasal spray; 2 sprays into the nostril(s) as directed by provider Daily.  Dispense: 16 g; Refill: 0    2. Blood in ear canal, bilateral  -     ciprofloxacin-dexamethasone (Ciprodex) 0.3-0.1 %  otic suspension; Administer 4 drops into both ears 2 (Two) Times a Day for 7 days.  Dispense: 7.5 mL; Refill: 0                 The use of a video visit has been reviewed with the patient and verbal informed consent has been obtained. Myself and George Hunter participated in this visit. The patient is located in [place], [place]. I am located in [place], [place]. Mychart and Zoom were utilized. I spent 20 minutes in the patient's chart for this visit.

## 2022-12-06 RX ORDER — AZITHROMYCIN 250 MG/1
TABLET, FILM COATED ORAL
Qty: 6 TABLET | Refills: 0 | Status: SHIPPED | OUTPATIENT
Start: 2022-12-06 | End: 2022-12-14

## 2022-12-16 ENCOUNTER — TELEMEDICINE (OUTPATIENT)
Dept: FAMILY MEDICINE CLINIC | Facility: TELEHEALTH | Age: 42
End: 2022-12-16

## 2022-12-16 DIAGNOSIS — R19.7 DIARRHEA, UNSPECIFIED TYPE: ICD-10-CM

## 2022-12-16 DIAGNOSIS — R11.2 NAUSEA AND VOMITING, UNSPECIFIED VOMITING TYPE: Primary | ICD-10-CM

## 2022-12-16 PROCEDURE — 99213 OFFICE O/P EST LOW 20 MIN: CPT | Performed by: NURSE PRACTITIONER

## 2022-12-16 RX ORDER — ONDANSETRON 8 MG/1
8 TABLET, ORALLY DISINTEGRATING ORAL EVERY 8 HOURS PRN
Qty: 9 TABLET | Refills: 0 | OUTPATIENT
Start: 2022-12-16 | End: 2023-02-05

## 2022-12-16 NOTE — PROGRESS NOTES
CHIEF COMPLAINT  Chief Complaint   Patient presents with   • Nausea   • Vomiting   • Diarrhea         HPI  George Hunter is a 42 y.o. male  presents with complaint of nausea and vomiting with associated diarrhea and mild abdominal cramping that began early this morning. His wife is having similar symptoms. He has no fever, but does feel warm. He denies headache or body aches. He has no known illness exposure.     Review of Systems   Constitutional: Positive for activity change, appetite change and chills. Negative for fever.   HENT: Negative.    Respiratory: Negative.    Cardiovascular: Negative.    Gastrointestinal: Positive for abdominal pain, diarrhea, nausea and vomiting.   Musculoskeletal: Negative.    Skin: Negative.    Neurological: Negative.    Psychiatric/Behavioral: Negative.        Past Medical History:   Diagnosis Date   • Allergies    • Anxiety    • Back injury    • Broken bones    • Deafness    • Diabetes ([facility])    • Diabetes mellitus type I ([facility])    • Foot pain, bilateral 2018   • Fracture    • Hammertoe    • Head injury    • Hernia cerebri ([facility])    • Hypothyroidism    • Neuropathy in diabetes ([facility]) 2009   • Plantar fascial fibromatosis of both feet 2018   • Toe pain, right        Family History   Family history unknown: Yes       Social History     Socioeconomic History   • Marital status:    • Number of children: 1   Tobacco Use   • Smoking status: Former     Packs/day: 0.50     Years: 10.00     Pack years: 5.00     Types: Cigarettes, Cigars   • Smokeless tobacco: Never   Vaping Use   • Vaping Use: Never used   Substance and Sexual Activity   • Alcohol use: Not Currently   • Drug use: Never   • Sexual activity: Yes     Partners: Female         There were no vitals taken for this visit.    PHYSICAL EXAM  Physical Exam   Constitutional: He is oriented to person, place, and time. He appears well-developed and well-nourished. He does not have a sickly appearance. He does not appear ill. No  distress.   HENT:   Head: Normocephalic and atraumatic.   Pulmonary/Chest: Effort normal.  No respiratory distress.  Abdominal: Abdomen appears normal. Soft. He exhibits no distension. There is no abdominal tenderness.   Neurological: He is alert and oriented to person, place, and time.   Psychiatric: He has a normal mood and affect.   Vitals reviewed.      Results for orders placed or performed during the hospital encounter of 12/14/22   POCT SARS-CoV-2 Antigen SLIME   ([facility])    Specimen: Swab   Result Value Ref Range    SARS Antigen Not Detected     Internal Control Passed     Lot Number [id number]     Expiration Date 7/4/23    POC Influenza A/B    Specimen: Swab   Result Value Ref Range    Rapid Influenza A Ag Negative     Rapid Influenza B Ag Negative     Internal Control Passed     Lot Number [id number]     Expiration Date 3/26/24        Diagnoses and all orders for this visit:    1. Nausea and vomiting, unspecified vomiting type (Primary)  -     ondansetron ODT (ZOFRAN-ODT) 8 MG disintegrating tablet; Place 1 tablet on the tongue Every 8 (Eight) Hours As Needed for Nausea or Vomiting.  Dispense: 9 tablet; Refill: 0    2. Diarrhea, unspecified type    clear liquids no milk or dairy.   Gradually add solids as tolerated. BRAT diet.   Follow up for worsening s/s with PCP or UTC.     The use of a video visit has been reviewed with the patient and verbal informd consent has een obtained. Myself and George Hunter participated in this visit. The patient is located in [address] [place] [address]. I am located in [place], [place]. Mychart and Zoom were utilized. I spent 15 minutes in the patient's chart for this visit.           Patsy Murrell, APRN  12/16/2022  07:13 EST

## 2023-02-05 ENCOUNTER — HOSPITAL ENCOUNTER (EMERGENCY)
Facility: HOSPITAL | Age: 43
Discharge: HOME | End: 2023-02-05
Attending: EMERGENCY MEDICINE | Admitting: EMERGENCY MEDICINE
Payer: COMMERCIAL

## 2023-02-05 VITALS
DIASTOLIC BLOOD PRESSURE: 84 MMHG | BODY MASS INDEX: 30.56 KG/M2 | WEIGHT: 218.26 LBS | RESPIRATION RATE: 18 BRPM | HEIGHT: 71 IN | OXYGEN SATURATION: 97 % | TEMPERATURE: 98.1 F | HEART RATE: 83 BPM | SYSTOLIC BLOOD PRESSURE: 132 MMHG

## 2023-02-05 DIAGNOSIS — B34.9 ACUTE VIRAL SYNDROME: Primary | ICD-10-CM

## 2023-02-05 DIAGNOSIS — M79.10 MYALGIA: ICD-10-CM

## 2023-02-05 LAB
ALBUMIN SERPL-MCNC: 4 G/DL (ref 3.5–5.2)
ALBUMIN/GLOB SERPL: 1.6 G/DL
ALP SERPL-CCNC: 91 U/L (ref 39–117)
ALT SERPL W P-5'-P-CCNC: 20 U/L (ref 1–41)
ANION GAP SERPL CALCULATED.3IONS-SCNC: 6 MMOL/L (ref 5–15)
AST SERPL-CCNC: 20 U/L (ref 1–40)
BASOPHILS # BLD AUTO: 0.03 10*3/MM3 (ref 0–0.2)
BASOPHILS NFR BLD AUTO: 0.5 % (ref 0–1.5)
BILIRUB SERPL-MCNC: 0.6 MG/DL (ref 0–1.2)
BUN SERPL-MCNC: 9 MG/DL (ref 6–20)
BUN/CREAT SERPL: 8.9 (ref 7–25)
CALCIUM SPEC-SCNC: 9.1 MG/DL (ref 8.6–10.5)
CHLORIDE SERPL-SCNC: 105 MMOL/L (ref 98–107)
CO2 SERPL-SCNC: 30 MMOL/L (ref 22–29)
CREAT SERPL-MCNC: 1.01 MG/DL (ref 0.76–1.27)
DEPRECATED RDW RBC AUTO: 38.2 FL (ref 37–54)
EGFRCR SERPLBLD CKD-EPI 2021: 94.6 ML/MIN/1.73
EOSINOPHIL # BLD AUTO: 0.22 10*3/MM3 (ref 0–0.4)
EOSINOPHIL NFR BLD AUTO: 3.6 % (ref 0.3–6.2)
ERYTHROCYTE [DISTWIDTH] IN BLOOD BY AUTOMATED COUNT: 12.2 % (ref 12.3–15.4)
FLUAV AG NPH QL: NEGATIVE
FLUBV AG NPH QL IA: NEGATIVE
GLOBULIN UR ELPH-MCNC: 2.5 GM/DL
GLUCOSE SERPL-MCNC: 78 MG/DL (ref 65–99)
HCT VFR BLD AUTO: 46.4 % (ref 37.5–51)
HGB BLD-MCNC: 16.4 G/DL (ref 13–17.7)
HOLD SPECIMEN: NORMAL
HOLD SPECIMEN: NORMAL
IMM GRANULOCYTES # BLD AUTO: 0.01 10*3/MM3 (ref 0–0.05)
IMM GRANULOCYTES NFR BLD AUTO: 0.2 % (ref 0–0.5)
LYMPHOCYTES # BLD AUTO: 2.3 10*3/MM3 (ref 0.7–3.1)
LYMPHOCYTES NFR BLD AUTO: 38.1 % (ref 19.6–45.3)
MCH RBC QN AUTO: 30.4 PG (ref 26.6–33)
MCHC RBC AUTO-ENTMCNC: 35.3 G/DL (ref 31.5–35.7)
MCV RBC AUTO: 85.9 FL (ref 79–97)
MONOCYTES # BLD AUTO: 0.4 10*3/MM3 (ref 0.1–0.9)
MONOCYTES NFR BLD AUTO: 6.6 % (ref 5–12)
NEUTROPHILS NFR BLD AUTO: 3.08 10*3/MM3 (ref 1.7–7)
NEUTROPHILS NFR BLD AUTO: 51 % (ref 42.7–76)
NRBC BLD AUTO-RTO: 0 /100 WBC (ref 0–0.2)
PLATELET # BLD AUTO: 283 10*3/MM3 (ref 140–450)
PMV BLD AUTO: 9.4 FL (ref 6–12)
POTASSIUM SERPL-SCNC: 3.8 MMOL/L (ref 3.5–5.2)
PROT SERPL-MCNC: 6.5 G/DL (ref 6–8.5)
RBC # BLD AUTO: 5.4 10*6/MM3 (ref 4.14–5.8)
SODIUM SERPL-SCNC: 141 MMOL/L (ref 136–145)
WBC NRBC COR # BLD: 6.04 10*3/MM3 (ref 3.4–10.8)
WHOLE BLOOD HOLD COAG: NORMAL
WHOLE BLOOD HOLD SPECIMEN: NORMAL

## 2023-02-05 PROCEDURE — 87804 INFLUENZA ASSAY W/OPTIC: CPT | Performed by: EMERGENCY MEDICINE

## 2023-02-05 PROCEDURE — 85025 COMPLETE CBC W/AUTO DIFF WBC: CPT | Performed by: EMERGENCY MEDICINE

## 2023-02-05 PROCEDURE — 99283 EMERGENCY DEPT VISIT LOW MDM: CPT

## 2023-02-05 PROCEDURE — U0004 COV-19 TEST NON-CDC HGH THRU: HCPCS | Performed by: EMERGENCY MEDICINE

## 2023-02-05 PROCEDURE — 25010000002 ONDANSETRON PER 1 MG: Performed by: EMERGENCY MEDICINE

## 2023-02-05 PROCEDURE — C9803 HOPD COVID-19 SPEC COLLECT: HCPCS | Performed by: EMERGENCY MEDICINE

## 2023-02-05 PROCEDURE — 96374 THER/PROPH/DIAG INJ IV PUSH: CPT

## 2023-02-05 PROCEDURE — 80053 COMPREHEN METABOLIC PANEL: CPT | Performed by: EMERGENCY MEDICINE

## 2023-02-05 PROCEDURE — 25010000002 KETOROLAC TROMETHAMINE PER 15 MG: Performed by: EMERGENCY MEDICINE

## 2023-02-05 PROCEDURE — 96375 TX/PRO/DX INJ NEW DRUG ADDON: CPT

## 2023-02-05 RX ORDER — ONDANSETRON 4 MG/1
4 TABLET, ORALLY DISINTEGRATING ORAL EVERY 6 HOURS PRN
Qty: 12 TABLET | Refills: 0 | Status: SHIPPED | OUTPATIENT
Start: 2023-02-05

## 2023-02-05 RX ORDER — SODIUM CHLORIDE 0.9 % (FLUSH) 0.9 %
10 SYRINGE (ML) INJECTION AS NEEDED
Status: DISCONTINUED | OUTPATIENT
Start: 2023-02-05 | End: 2023-02-06 | Stop reason: HOSPADM

## 2023-02-05 RX ORDER — ONDANSETRON 2 MG/ML
4 INJECTION INTRAMUSCULAR; INTRAVENOUS ONCE
Status: COMPLETED | OUTPATIENT
Start: 2023-02-05 | End: 2023-02-05

## 2023-02-05 RX ORDER — KETOROLAC TROMETHAMINE 30 MG/ML
30 INJECTION, SOLUTION INTRAMUSCULAR; INTRAVENOUS ONCE
Status: COMPLETED | OUTPATIENT
Start: 2023-02-05 | End: 2023-02-05

## 2023-02-05 RX ADMIN — ONDANSETRON 4 MG: 2 INJECTION INTRAMUSCULAR; INTRAVENOUS at 22:24

## 2023-02-05 RX ADMIN — KETOROLAC TROMETHAMINE 30 MG: 30 INJECTION, SOLUTION INTRAMUSCULAR; INTRAVENOUS at 22:24

## 2023-02-05 RX ADMIN — SODIUM CHLORIDE 1000 ML: 9 INJECTION, SOLUTION INTRAVENOUS at 22:21

## 2023-02-06 LAB — SARS-COV-2 RNA PNL SPEC NAA+PROBE: NOT DETECTED

## 2023-02-06 NOTE — ED PROVIDER NOTES
Time: 9:42 PM EST  Date of encounter:  2/5/2023  Independent Historian/Clinical History and Information was obtained by:   Patient  Chief Complaint: Body aches, nausea    History is limited by: N/A    History of Present Illness:  Patient is a 42 y.o. year old male who presents to the emergency department for evaluation of acute onset of nausea and body aches and cold chills today.    He denies any known sick contacts or recent travel history or suspicious food exposures.    He denies any vomiting or diarrhea but does feel nauseated.    He denies any sore throat or cough or congestion.    He complains of generalized malaise and body aches and some headache and cold chills.    He has already had COVID-19 3 separate times.        HPI    Patient Care Team  Primary Care Provider: Jak Colindres APRN    Past Medical History:     Allergies   Allergen Reactions   • Penicillins Anaphylaxis     Childhood allergy     • Codeine Itching     Past Medical History:   Diagnosis Date   • Allergies    • Anxiety    • Back injury    • Broken bones    • Deafness    • Diabetes ([facility])    • Diabetes mellitus type I ([facility])    • Foot pain, bilateral 2018   • Fracture    • Hammertoe    • Head injury    • Hernia cerebri ([facility])    • Hypothyroidism    • Neuropathy in diabetes ([facility]) 2009   • Plantar fascial fibromatosis of both feet 2018   • Toe pain, right      Past Surgical History:   Procedure Laterality Date   • HERNIA REPAIR     • TOENAIL EXCISION  2021     Family History   Family history unknown: Yes       Home Medications:  Prior to Admission medications    Medication Sig Start Date End Date Taking? Authorizing Provider   cetirizine-pseudoephedrine (ZyrTEC-D) 5-120 MG per 12 hr tablet Take 1 tablet by mouth 2 (Two) Times a Day. 1/15/22   Annia Jackson MD   Continuous Blood Gluc Sensor (FreeStyle Nadine 3 Sensor) misc 1 each Every 14 (Fourteen) Days. 10/13/22   Indu Lloyd APRN   Euthyrox 25 MCG tablet Take 25 mcg by mouth Daily.  "7/12/21   Provider, MD Rudolph   fluticasone (Flonase) 50 MCG/ACT nasal spray 2 sprays into the nostril(s) as directed by provider Daily. 12/5/22   Ariana Salazar APRN   Insulin Glargine (BASAGLAR KWIKPEN) 100 UNIT/ML injection pen INJECT 40 UNITS SUBCUTANEOUSLY ONCE DAILY AS DIRECTED 10/16/22   Indu Lloyd APRN   lisinopril (Zestril) 2.5 MG tablet Take 1 tablet by mouth Daily. 11/11/21   Indu Lloyd APRN   NovoLOG FlexPen 100 UNIT/ML solution pen-injector sc pen INJECT 30 UNITS SUBCUTANEOUSLY INTO THE APPROPRIATE AREA AS DIRECTED THREE TIMES DAILY WITH MEALS 10/16/22   Indu Lloyd APRN   omeprazole (priLOSEC) 40 MG capsule TAKE 1 CAPSULE BY MOUTH ONCE DAILY BEFORE A MEAL 11/14/22   Jak Colindres APRN   ondansetron ODT (ZOFRAN-ODT) 8 MG disintegrating tablet Place 1 tablet on the tongue Every 8 (Eight) Hours As Needed for Nausea or Vomiting. 12/16/22   Patsy Murrell APRN   simvastatin (ZOCOR) 10 MG tablet Take 1 tablet by mouth Every Night. 11/11/21   Indu Lloyd APRN        Social History:   Social History     Tobacco Use   • Smoking status: Former     Packs/day: 0.50     Years: 10.00     Pack years: 5.00     Types: Cigarettes, Cigars   • Smokeless tobacco: Never   Vaping Use   • Vaping Use: Never used   Substance Use Topics   • Alcohol use: Not Currently   • Drug use: Never         Review of Systems:  Review of Systems   I performed a 10 point review of systems which was all negative, except for the positives found in the HPI above.  Physical Exam:  /84 (BP Location: Right arm, Patient Position: Lying)   Pulse 83   Temp 98.1 °F (36.7 °C) (Oral)   Resp 18   Ht 180.3 cm (71\")   Wt 99 kg (218 lb 4.1 oz)   SpO2 97%   BMI 30.44 kg/m²     Physical Exam   General: Awake alert and in no severe distress, but does appear generally uncomfortable    HEENT: Head normocephalic atraumatic, eyes PERRLA EOMI, nose normal, oropharynx normal.    Neck: Supple full " range of motion, no meningismus, no lymphadenopathy    Heart: Regular rate and rhythm, no murmurs or rubs, 2+ radial pulses bilaterally    Lungs: Clear to auscultation bilaterally without wheezes or crackles, no respiratory distress    Abdomen: Soft, nontender, nondistended, no rebound or guarding    Skin: Warm, dry, no rash    Musculoskeletal: Normal range of motion, no lower extremity edema    Neurologic: Oriented x3, no motor deficits no sensory deficits    Psychiatric: Mood appears stable, no psychosis          Procedures:  Procedures      Medical Decision Making:      Comorbidities that affect care:    Diabetes    External Notes reviewed:    Previous Clinic Note      The following orders were placed and all results were independently analyzed by me:  Orders Placed This Encounter   Procedures   • Influenza Antigen, Rapid - Swab, Nasopharynx   • COVID-19,APTIMA PANTHER(LAKSHMI), TOM/ KRISTIAN, NP/OP SWAB IN UTM/VTM/SALINE TRANSPORT MEDIA,24 HR TAT - Swab, Nasal Cavity   • Comprehensive Metabolic Panel   • CBC Auto Differential   • [place] Draw   • Insert Peripheral IV   • Insert peripheral IV   • CBC & Differential   • Green Top (Gel)   • Lavender Top   • Gold Top - SST   • Light Blue Top       Medications Given in the Emergency Department:  Medications   sodium chloride 0.9 % flush 10 mL (has no administration in time range)   sodium chloride 0.9 % bolus 1,000 mL (1,000 mL Intravenous New Bag 2/5/23 2221)   sodium chloride 0.9 % flush 10 mL (has no administration in time range)   ketorolac (TORADOL) injection 30 mg (30 mg Intravenous Given 2/5/23 2224)   ondansetron (ZOFRAN) injection 4 mg (4 mg Intravenous Given 2/5/23 2224)        ED Course:         Labs:    Lab Results (last 24 hours)     Procedure Component Value Units Date/Time    Influenza Antigen, Rapid - Swab, Nasopharynx [[id number]]  (Normal) Collected: 02/05/23 2148    Specimen: Swab from Nasopharynx Updated: 02/05/23 2224     Influenza A Ag, EIA Negative      Influenza B Ag, EIA Negative    COVID-19,APTIMA PANTHER(LAKSHMI),BH TOM/ KRISTIAN, NP/OP SWAB IN UTM/VTM/SALINE TRANSPORT MEDIA,24 HR TAT - Swab, Nasal Cavity [[id number]] Collected: 02/05/23 2148    Specimen: Swab from Nasal Cavity Updated: 02/05/23 2157    CBC & Differential [[id number]]  (Abnormal) Collected: 02/05/23 2153    Specimen: Blood Updated: 02/05/23 2201    Narrative:      The following orders were created for panel order CBC & Differential.  Procedure                               Abnormality         Status                     ---------                               -----------         ------                     CBC Auto Differential[[id number]]        Abnormal            Final result                 Please view results for these tests on the individual orders.    Comprehensive Metabolic Panel [[id number]]  (Abnormal) Collected: 02/05/23 2153    Specimen: Blood Updated: 02/05/23 2218     Glucose 78 mg/dL      BUN 9 mg/dL      Creatinine 1.01 mg/dL      Sodium 141 mmol/L      Potassium 3.8 mmol/L      Chloride 105 mmol/L      CO2 30.0 mmol/L      Calcium 9.1 mg/dL      Total Protein 6.5 g/dL      Albumin 4.0 g/dL      ALT (SGPT) 20 U/L      AST (SGOT) 20 U/L      Alkaline Phosphatase 91 U/L      Total Bilirubin 0.6 mg/dL      Globulin 2.5 gm/dL      A/G Ratio 1.6 g/dL      BUN/Creatinine Ratio 8.9     Anion Gap 6.0 mmol/L      eGFR 94.6 mL/min/1.73     Narrative:      GFR Normal >60  Chronic Kidney Disease <60  Kidney Failure <15      CBC Auto Differential [[id number]]  (Abnormal) Collected: 02/05/23 2153    Specimen: Blood Updated: 02/05/23 2201     WBC 6.04 10*3/mm3      RBC 5.40 10*6/mm3      Hemoglobin 16.4 g/dL      Hematocrit 46.4 %      MCV 85.9 fL      MCH 30.4 pg      MCHC 35.3 g/dL      RDW 12.2 %      RDW-SD 38.2 fl      MPV 9.4 fL      Platelets 283 10*3/mm3      Neutrophil % 51.0 %      Lymphocyte % 38.1 %      Monocyte % 6.6 %      Eosinophil % 3.6 %      Basophil % 0.5 %      Immature Grans % 0.2  %      Neutrophils, Absolute 3.08 10*3/mm3      Lymphocytes, Absolute 2.30 10*3/mm3      Monocytes, Absolute 0.40 10*3/mm3      Eosinophils, Absolute 0.22 10*3/mm3      Basophils, Absolute 0.03 10*3/mm3      Immature Grans, Absolute 0.01 10*3/mm3      nRBC 0.0 /100 WBC            Imaging:    No Radiology Exams Resulted Within Past 24 Hours      Differential Diagnosis and Discussion:    Fever: Based on the complaint of fever, differential diagnosis includes but is not limited to meningitis, pneumonia, pyelonephritis, acute uti,  systemic immune response syndrome, sepsis, viral syndrome, fungal infection, tick born illness and other bacterial infections.    All labs were reviewed and analyzed by me.    MDM             This patient is a pleasant 42-year-old diabetic male who has acute onset of generalized malaise, myalgias and headache, nausea and cold chills.    He appears to be coming down with a viral illness, and I will check flu and COVID-19.    I will give him some IV fluids and Toradol for symptom relief and Zofran.  I am checking some screening labs as well.    I do not see any signs of a bacterial infection and we will give him supportive care instructions for viral illness and a work excuse note.    I reviewed all of his blood work which was unremarkable including normal white blood cell count of 6 and normal renal function and electrolytes and LFTs.    Flu swab is negative and COVID-19 swab is pending.    On reassessment after some Toradol and fluids and Zofran he looks improved.    I were given supportive care instructions and a work note and have him follow-up with his doctor for likely self-limiting viral illness.          Patient Care Considerations:    CHEST X-RAY: I considered ordering a chest x-ray however He is not having any cough and lung fields are clear and he is not hypoxic.      Consultants/Shared Management Plan:        Social Determinants of Health:    Patient is independent, reliable, and  has access to care.       Disposition and Care Coordination:    Discharged: The patient is suitable and stable for discharge with no need for consideration of observation or admission.    I have explained the patient´s condition, diagnoses and treatment plan based on the information available to me at this time. I have answered questions and addressed any concerns. The patient has a good  understanding of the patient´s diagnosis, condition, and treatment plan as can be expected at this point. The vital signs have been stable. The patient´s condition is stable and appropriate for discharge from the emergency department.      The patient will pursue further outpatient evaluation with the primary care physician or other designated or consulting physician as outlined in the discharge instructions. They are agreeable to this plan of care and follow-up instructions have been explained in detail. The patient has received these instructions in written format and have expressed an understanding of the discharge instructions. The patient is aware that any significant change in condition or worsening of symptoms should prompt an immediate return to this or the closest emergency department or call to 911.  I have explained discharge medications and the need for follow up with the patient/caretakers. This was also printed in the discharge instructions. Patient was discharged with the following medications and follow up:      Medication List      Changed    ondansetron ODT 4 MG disintegrating tablet  Commonly known as: ZOFRAN-ODT  Place 1 tablet on the tongue Every 6 (Six) Hours As Needed for Nausea or Vomiting.  What changed:   · medication strength  · how much to take  · when to take this           Where to Get Your Medications      These medications were sent to [address], [place] - [address] [facility] - [phone]  - [phone]   [address] [facility] [place] [address]     Phone: [phone]   · ondansetron ODT 4 MG disintegrating tablet      Jak Colindres, APRN  [address]  [facility] [address]  [place] [address]  [phone]    Call in 2 days  As needed, If symptoms worsen, for a follow-up appointment       Final diagnoses:   Myalgia   Acute viral syndrome        ED Disposition     ED Disposition   Discharge    Condition   Stable    Comment   --             This medical record created using voice recognition software.           Darien Zamora MD  02/05/23 6963

## 2023-02-06 NOTE — DISCHARGE INSTRUCTIONS
All of your blood work looked excellent today and your white blood cell count was on the low end of normal measuring only 6, which is usually seen with more of a viral illness as opposed to bacterial infection.    Your flu swab is negative and your COVID-19 swab still pending.    For now get plenty of rest and drink fluids to stay hydrated and take the Zofran dissolvable pills under the tongue as needed for nausea and just take some ibuprofen for aches and pains.

## 2023-02-08 ENCOUNTER — OFFICE VISIT (OUTPATIENT)
Dept: FAMILY MEDICINE CLINIC | Facility: CLINIC | Age: 43
End: 2023-02-08
Payer: COMMERCIAL

## 2023-02-08 VITALS
TEMPERATURE: 97.7 F | BODY MASS INDEX: 30.8 KG/M2 | SYSTOLIC BLOOD PRESSURE: 130 MMHG | DIASTOLIC BLOOD PRESSURE: 76 MMHG | WEIGHT: 220 LBS | HEIGHT: 71 IN | OXYGEN SATURATION: 97 % | HEART RATE: 97 BPM

## 2023-02-08 DIAGNOSIS — M54.41 ACUTE RIGHT-SIDED LOW BACK PAIN WITH RIGHT-SIDED SCIATICA: Primary | ICD-10-CM

## 2023-02-08 PROCEDURE — 96372 THER/PROPH/DIAG INJ SC/IM: CPT | Performed by: NURSE PRACTITIONER

## 2023-02-08 PROCEDURE — 99213 OFFICE O/P EST LOW 20 MIN: CPT | Performed by: NURSE PRACTITIONER

## 2023-02-08 RX ORDER — CYCLOBENZAPRINE HCL 10 MG
10 TABLET ORAL 2 TIMES DAILY PRN
Qty: 60 TABLET | Refills: 0 | Status: SHIPPED | OUTPATIENT
Start: 2023-02-08

## 2023-02-08 RX ORDER — METHYLPREDNISOLONE ACETATE 40 MG/ML
80 INJECTION, SUSPENSION INTRA-ARTICULAR; INTRALESIONAL; INTRAMUSCULAR; SOFT TISSUE ONCE
Status: COMPLETED | OUTPATIENT
Start: 2023-02-08 | End: 2023-02-08

## 2023-02-08 RX ORDER — DICLOFENAC SODIUM 75 MG/1
75 TABLET, DELAYED RELEASE ORAL 2 TIMES DAILY
Qty: 180 TABLET | Refills: 0 | Status: SHIPPED | OUTPATIENT
Start: 2023-02-08

## 2023-02-08 RX ORDER — KETOROLAC TROMETHAMINE 30 MG/ML
60 INJECTION, SOLUTION INTRAMUSCULAR; INTRAVENOUS ONCE
Status: COMPLETED | OUTPATIENT
Start: 2023-02-08 | End: 2023-02-08

## 2023-02-08 RX ADMIN — KETOROLAC TROMETHAMINE 60 MG: 30 INJECTION, SOLUTION INTRAMUSCULAR; INTRAVENOUS at 09:33

## 2023-02-08 RX ADMIN — METHYLPREDNISOLONE ACETATE 80 MG: 40 INJECTION, SUSPENSION INTRA-ARTICULAR; INTRALESIONAL; INTRAMUSCULAR; SOFT TISSUE at 09:34

## 2023-02-08 NOTE — PROGRESS NOTES
"Chief Complaint  Back Pain    Subjective         George Hunter presents to [facility]  Presents to the office today with complaints of low back pain.  Patient states that he was feeling well through the weekend and stayed in the bed.  He states that the pain increased last night causing low back pain that radiates down his right leg.  Patient rates the pain 10 out of 10.  Denies any injury or trauma.  He denies any urinary continence or bowel incontinence.    Back Pain         Objective     Vitals:    02/08/23 0853   BP: 130/76   BP Location: Right arm   Patient Position: Sitting   Cuff Size: Adult   Pulse: 97   Temp: 97.7 °F (36.5 °C)   TempSrc: Temporal   SpO2: 97%   Weight: 99.8 kg (220 lb)   Height: 180.3 cm (71\")      Body mass index is 30.68 kg/m².            Physical Exam  Vitals reviewed.   Constitutional:       Appearance: Normal appearance.   Cardiovascular:      Rate and Rhythm: Normal rate and regular rhythm.      Pulses: Normal pulses.      Heart sounds: Normal heart sounds, S1 normal and S2 normal. No murmur heard.  Pulmonary:      Effort: Pulmonary effort is normal. No respiratory distress.      Breath sounds: Normal breath sounds.   Musculoskeletal:      Lumbar back: Tenderness present. Decreased range of motion.        Back:    Skin:     General: Skin is warm and dry.   Neurological:      Mental Status: He is alert and oriented to person, place, and time.   Psychiatric:         Attention and Perception: Attention normal.         Mood and Affect: Mood normal.         Behavior: Behavior normal.          Result Review :   The following data was reviewed by: MALCOM Farias on 02/08/2023:      Procedures    Assessment and Plan   Diagnoses and all orders for this visit:    1. Acute right-sided low back pain with right-sided sciatica (Primary)  -     ketorolac (TORADOL) injection 60 mg  -     methylPREDNISolone acetate (DEPO-medrol) injection 80 mg  -     diclofenac " (VOLTAREN) 75 MG EC tablet; Take 1 tablet by mouth 2 (Two) Times a Day.  Dispense: 180 tablet; Refill: 0  -     cyclobenzaprine (FLEXERIL) 10 MG tablet; Take 1 tablet by mouth 2 (Two) Times a Day As Needed for Muscle Spasms.  Dispense: 60 tablet; Refill: 0          Follow Up   Return if symptoms worsen or fail to improve.  Patient was given instructions and counseling regarding his condition or for health maintenance advice. Please see specific information pulled into the AVS if appropriate.

## 2023-02-09 RX ORDER — INSULIN GLARGINE 100 [IU]/ML
INJECTION, SOLUTION SUBCUTANEOUS
Qty: 15 ML | Refills: 2 | Status: SHIPPED | OUTPATIENT
Start: 2023-02-09

## 2023-02-24 RX ORDER — OMEPRAZOLE 40 MG/1
CAPSULE, DELAYED RELEASE ORAL
Qty: 90 CAPSULE | Refills: 0 | Status: SHIPPED | OUTPATIENT
Start: 2023-02-24

## 2023-03-06 RX ORDER — INSULIN ASPART 100 [IU]/ML
INJECTION, SOLUTION INTRAVENOUS; SUBCUTANEOUS
Qty: 15 ML | Refills: 2 | Status: SHIPPED | OUTPATIENT
Start: 2023-03-06

## 2023-03-30 NOTE — PROGRESS NOTES
Chief Complaint  Diabetes (Follow up, med mgt)    Referred By: MALCOM Farias presents to [facility] for diabetes medication management    History of Present Illness    Visit type:  follow-up  Diabetes type:  Type 1  Current diabetes status/concerns/issues: He reports having to call EMS on at least 2 occasions during the overnight hours due to episodes of hypoglycemia where he was awakened feeling like he was almost in a preseizure-like mode.  Other health concerns: He does report he feels like he forgets things frequently.  Current Diabetes symptoms:    Polyuria: No   Polydipsia: No   Polyphagia: No   Blurred vision: No   Excessive fatigue: No  Known Diabetes complications:  Neuropathy: None; Location: N/A  Renal: None  Eyes: None; Location: N/A  Amputation/Wounds: None  GI: None  Cardiovascular: None  ED: None  Other: None  Hypoglycemia:  Level 1 hypoglycemia (54 mg/dL - 70 mg/dL); Frequency - The 14-day sensor report shows 12% of glucose levels are falling in this range, Level 2 (less than 54 mg/dL); Frequency - 8% of glucose levels were found in this range, Level 3 (altered mental or physical status requiring assistance); Frequency - He has had 2 episodes were EMS has been called and Patient reports that some of the hypoglycemic events recorded by the CGM are not actually as low as they are being seen.  He states he will do fingerstick at times to validate and his fingerstick is typically 20 points higher than the sensor.  Some episodes have been legitimate as he has had some EMS visits.  Hypoglycemia Symptoms:  sweating and Fatigue  Current diabetes treatment:  Basaglar 45 units daily and Humalog using a 1-10 carbohydrate ratio to 1-20 correction for glucose levels greater than 120 mg/dL.  He reports averaging around 7 to 10 units with most meals  Blood glucose device:  [employer] CGM  Blood glucose monitoring frequency:  Continuous per  CGM  Blood glucose range/average:  His 14-day sensor report shows an average glucose of 167 with 43% being in target 12% are in the low range and 8% are in the very low range less than 54 mg/dL and 14% are in the higher range and 23% be in the very high range. He is having some rebound hyperglycemia  Glucose Source: Device Reviewed  Diet:  Limits high carb/sweet foods, Avoids sugary drinks  Activity/Exercise:  He walks approximately 15,000 steps a day at work    Past Medical History:   Diagnosis Date   • Allergies    • Anxiety    • Back injury    • Broken bones    • Deafness    • Diabetes ([facility])    • Diabetes mellitus type I ([facility])    • Foot pain, bilateral 2018   • Fracture    • Hammertoe    • Head injury    • Hernia cerebri ([facility])    • Hypothyroidism    • Neuropathy in diabetes ([facility]) 2009   • Plantar fascial fibromatosis of both feet 2018   • Toe pain, right      Past Surgical History:   Procedure Laterality Date   • HERNIA REPAIR     • TOENAIL EXCISION  2021     Family History   Family history unknown: Yes     Social History     Socioeconomic History   • Marital status:    • Number of children: 1   Tobacco Use   • Smoking status: Former     Packs/day: 0.50     Years: 10.00     Pack years: 5.00     Types: Cigarettes, Cigars   • Smokeless tobacco: Never   Vaping Use   • Vaping Use: Never used   Substance and Sexual Activity   • Alcohol use: Not Currently   • Drug use: Never   • Sexual activity: Yes     Partners: Female     Allergies   Allergen Reactions   • Penicillins Anaphylaxis     Childhood allergy     • Codeine Itching       Current Outpatient Medications:   •  cetirizine-pseudoephedrine (ZyrTEC-D) 5-120 MG per 12 hr tablet, Take 1 tablet by mouth 2 (Two) Times a Day., Disp: 24 tablet, Rfl: 0  •  Continuous Blood Gluc Sensor (FreeStyle Nadine 3 Sensor) misc, 1 each Every 14 (Fourteen) Days., Disp: 2 each, Rfl: 5  •  cyclobenzaprine (FLEXERIL) 10 MG tablet, Take 1 tablet by mouth 2 (Two) Times a Day As  "Needed for Muscle Spasms., Disp: 60 tablet, Rfl: 0  •  diclofenac (VOLTAREN) 75 MG EC tablet, Take 1 tablet by mouth 2 (Two) Times a Day., Disp: 180 tablet, Rfl: 0  •  Euthyrox 25 MCG tablet, Take 1 tablet by mouth Daily., Disp: , Rfl:   •  fluticasone (Flonase) 50 MCG/ACT nasal spray, 2 sprays into the nostril(s) as directed by provider Daily., Disp: 16 g, Rfl: 0  •  Insulin Glargine (BASAGLAR KWIKPEN) 100 UNIT/ML injection pen, INJECT 40 UNITS SUBCUTANEOUSLY ONCE DAILY AS DIRECTED, Disp: 15 mL, Rfl: 2  •  lisinopril (Zestril) 2.5 MG tablet, Take 1 tablet by mouth Daily., Disp: 30 tablet, Rfl: 3  •  NovoLOG FlexPen 100 UNIT/ML solution pen-injector sc pen, INJECT 30 UNITS SUBCUTANEOUSLY AS DIRECTED THREE TIMES DAILY WITH MEALS, Disp: 15 mL, Rfl: 2  •  omeprazole (priLOSEC) 40 MG capsule, TAKE 1 CAPSULE BY MOUTH ONCE DAILY BEFORE A MEAL, Disp: 90 capsule, Rfl: 0  •  ondansetron ODT (ZOFRAN-ODT) 4 MG disintegrating tablet, Place 1 tablet on the tongue Every 6 (Six) Hours As Needed for Nausea or Vomiting., Disp: 12 tablet, Rfl: 0  •  simvastatin (ZOCOR) 10 MG tablet, Take 1 tablet by mouth Every Night., Disp: 30 tablet, Rfl: 3      Objective     Vitals:    03/31/23 1005   BP: 116/91   BP Location: Right arm   Patient Position: Sitting   Cuff Size: Adult   Pulse: 95   Temp: 97 °F (36.1 °C)   SpO2: 96%   Weight: 98.9 kg (218 lb)   Height: 185.4 cm (73\")   PainSc: 0-No pain     Body mass index is 28.76 kg/m².    Physical Exam  Constitutional:       Appearance: Normal appearance. He is normal weight.   HENT:      Head: Normocephalic and atraumatic.      Right Ear: External ear normal.      Left Ear: External ear normal.      Nose: Nose normal.   Eyes:      Extraocular Movements: Extraocular movements intact.      Conjunctiva/sclera: Conjunctivae normal.   Pulmonary:      Effort: Pulmonary effort is normal.   Musculoskeletal:         General: Normal range of motion.      Cervical back: Normal range of motion.   Skin:     " General: Skin is warm and dry.   Neurological:      General: No focal deficit present.      Mental Status: He is alert and oriented to person, place, and time. Mental status is at baseline.   Psychiatric:         Mood and Affect: Mood normal.         Behavior: Behavior normal.         Thought Content: Thought content normal.         Judgment: Judgment normal.         Result Review :   The following data was reviewed by: MALCOM Silverman on 03/31/2023:    Most Recent A1C    HGBA1C Most Recent 10/13/22   Hemoglobin A1C 8.4             A1C Last 3 Results    HGBA1C Last 3 Results 7/7/22 10/13/22   Hemoglobin A1C 7.7 8.4           Last a1c from 10/13/22 indicates uncontrolled type 1 DM. Unfortunately we were unable to check his a1c due to lack of supplies.     Glucose   Date Value Ref Range Status   03/31/2023 307 (H) 70 - 99 mg/dL Final     Comment:     Serial Number: [id number]:  [id number]     nonfasting glucose elevated in the office today.    Creatinine   Date Value Ref Range Status   02/05/2023 1.01 0.76 - 1.27 mg/dL Final   06/01/2022 0.82 0.76 - 1.27 mg/dL Final     eGFR   Date Value Ref Range Status   02/05/2023 94.6 >60.0 mL/min/1.73 Final   06/01/2022 112.5 >60.0 mL/min/1.73 Final     Comment:     National Kidney Foundation and American Society of Nephrology (ASN) Task Force recommended calculation based on the Chronic Kidney Disease Epidemiology Collaboration (CKD-EPI) equation refit without adjustment for race.     Labs collected on 2/5/2023 show normal renal function          Assessment: According to his CGM report he is having severe episodes of hypoglycemia however the pt reports he is not having as many lows as his sensor indicates. He has had 2 episodes of hypoglycemia requiring EMS.  I am suspicious for some probable hypoglycemic unawareness.  The patient does have emergency treatment at home on his nightstand.      Diagnoses and all orders for this visit:    1. Uncontrolled type 1  diabetes mellitus with hyperglycemia (HCC) (Primary)    2. Overweight (BMI 25.0-29.9)    3. Uses self-applied continuous glucose monitoring device    Other orders  -     POC Glucose        Plan: Changed his basaglar from 45 units once day to 20 units twice a day to avoid hypoglycemia. He is to continue his current humalog dosing.  He will call the office if he continues to have problematic hypoglycemia.    The patient will monitor his blood glucose levels per CGM.  If he develops problematic hyperglycemia or hypoglycemia or adverse drug reactions, he will contact the office for further instructions.        Follow Up     Return in about 3 months (around 6/30/2023) for Medication Management, CGM Follow-up.    Patient was given instructions and counseling regarding his condition or for health maintenance advice. Please see specific information pulled into the AVS if appropriate.     Indu Lloyd, APRN  03/31/2023      Dictated Utilizing Dragon Dictation.  Please note that portions of this note were completed with a voice recognition program.  Part of this note may be an electronic transcription/translation of spoken language to printed text using the Dragon Dictation System.

## 2023-03-31 ENCOUNTER — OFFICE VISIT (OUTPATIENT)
Dept: DIABETES SERVICES | Facility: HOSPITAL | Age: 43
End: 2023-03-31
Payer: COMMERCIAL

## 2023-03-31 VITALS
HEART RATE: 95 BPM | SYSTOLIC BLOOD PRESSURE: 116 MMHG | BODY MASS INDEX: 28.89 KG/M2 | HEIGHT: 73 IN | DIASTOLIC BLOOD PRESSURE: 91 MMHG | WEIGHT: 218 LBS | TEMPERATURE: 97 F | OXYGEN SATURATION: 96 %

## 2023-03-31 DIAGNOSIS — E66.3 OVERWEIGHT (BMI 25.0-29.9): ICD-10-CM

## 2023-03-31 DIAGNOSIS — E10.65 UNCONTROLLED TYPE 1 DIABETES MELLITUS WITH HYPERGLYCEMIA: Primary | ICD-10-CM

## 2023-03-31 DIAGNOSIS — Z97.8 USES SELF-APPLIED CONTINUOUS GLUCOSE MONITORING DEVICE: ICD-10-CM

## 2023-03-31 LAB — GLUCOSE BLDC GLUCOMTR-MCNC: 307 MG/DL (ref 70–99)

## 2023-03-31 PROCEDURE — 99214 OFFICE O/P EST MOD 30 MIN: CPT | Performed by: NURSE PRACTITIONER

## 2023-03-31 PROCEDURE — 82962 GLUCOSE BLOOD TEST: CPT | Performed by: NURSE PRACTITIONER

## 2023-03-31 NOTE — PATIENT INSTRUCTIONS
Change her Basaglar to taking 20 units twice a day approximately 12 hours apart    No changes to the Humalog dosing    If you continue to have problematic hypoglycemia contact Indu so additional changes can be made

## 2023-04-26 DIAGNOSIS — E10.65 UNCONTROLLED TYPE 1 DIABETES MELLITUS WITH HYPERGLYCEMIA: ICD-10-CM

## 2023-04-26 RX ORDER — BLOOD-GLUCOSE SENSOR
EACH MISCELLANEOUS
Qty: 2 EACH | Refills: 5 | Status: SHIPPED | OUTPATIENT
Start: 2023-04-26

## 2023-04-27 ENCOUNTER — TELEMEDICINE (OUTPATIENT)
Dept: FAMILY MEDICINE CLINIC | Facility: TELEHEALTH | Age: 43
End: 2023-04-27
Payer: COMMERCIAL

## 2023-04-27 DIAGNOSIS — K52.9 GASTROENTERITIS: Primary | ICD-10-CM

## 2023-04-27 RX ORDER — ONDANSETRON 4 MG/1
4 TABLET, ORALLY DISINTEGRATING ORAL EVERY 8 HOURS PRN
Qty: 15 TABLET | Refills: 0 | Status: SHIPPED | OUTPATIENT
Start: 2023-04-27 | End: 2023-05-04

## 2023-04-27 RX ORDER — DICYCLOMINE HYDROCHLORIDE 10 MG/1
10 CAPSULE ORAL
Qty: 28 CAPSULE | Refills: 0 | Status: SHIPPED | OUTPATIENT
Start: 2023-04-27 | End: 2023-05-04

## 2023-04-27 NOTE — PATIENT INSTRUCTIONS
Viral Gastroenteritis, Adult  Viral gastroenteritis is also known as the stomach flu. This condition may affect your stomach, small intestine, and large intestine. It can cause sudden watery diarrhea, fever, and vomiting. This condition is caused by many different viruses. These viruses can be passed from person to person very easily (are contagious).  Diarrhea and vomiting can make you feel weak and cause you to become dehydrated. You may not be able to keep fluids down. Dehydration can make you tired and thirsty, cause you to have a dry mouth, and decrease how often you urinate. It is important to replace the fluids that you lose from diarrhea and vomiting.  What are the causes?  Gastroenteritis is caused by many viruses, including rotavirus and norovirus. Norovirus is the most common cause in adults. You can get sick after being exposed to the viruses from other people. You can also get sick by:  Eating food, drinking water, or touching a surface contaminated with one of these viruses.  Sharing utensils or other personal items with an infected person.  What increases the risk?  You are more likely to develop this condition if you:  Have a weak body defense system (immune system).  Live with one or more children who are younger than 2 years old.  Live in a nursing home.  Travel on cruise ships.  What are the signs or symptoms?  Symptoms of this condition start suddenly 1-3 days after exposure to a virus. Symptoms may last for a few days or for as long as a week. Common symptoms include watery diarrhea and vomiting. Other symptoms include:  Fever.  Headache.  Fatigue.  Pain in the abdomen.  Chills.  Weakness.  Nausea.  Muscle aches.  Loss of appetite.  How is this diagnosed?  This condition is diagnosed with a medical history and physical exam. You may also have a stool test to check for viruses or other infections.  How is this treated?  This condition typically goes away on its own. The focus of treatment is to  prevent dehydration and restore lost fluids (rehydration). This condition may be treated with:  An oral rehydration solution (ORS) to replace important salts and minerals (electrolytes) in your body. Take this if told by your health care provider. This is a drink that is sold at pharmacies and retail stores.  Medicines to help with your symptoms.  Probiotic supplements to reduce symptoms of diarrhea.  Fluids given through an IV, if dehydration is severe.  Older adults and people with other diseases or a weak immune system are at higher risk for dehydration.  Follow these instructions at home:  Eating and drinking    Take an ORS as told by your health care provider.  Drink clear fluids in small amounts as you are able. Clear fluids include:  Water.  Ice chips.  Diluted fruit juice.  Low-calorie sports drinks.  Drink enough fluid to keep your urine pale yellow.  Eat small amounts of healthy foods every 3-4 hours as you are able. This may include whole grains, fruits, vegetables, lean meats, and yogurt.  Avoid fluids that contain a lot of sugar or caffeine, such as energy drinks, sports drinks, and soda.  Avoid spicy or fatty foods.  Avoid alcohol.  General instructions  Wash your hands often, especially after having diarrhea or vomiting. If soap and water are not available, use hand .  Make sure that all people in your household wash their hands well and often.  Take over-the-counter and prescription medicines only as told by your health care provider.  Rest at home while you recover.  Watch your condition for any changes.  Take a warm bath to relieve any burning or pain from frequent diarrhea episodes.  Keep all follow-up visits as told by your health care provider. This is important.  Contact a health care provider if you:  Cannot keep fluids down.  Have symptoms that get worse.  Have new symptoms.  Feel light-headed or dizzy.  Have muscle cramps.  Get help right away if you:  Have chest pain.  Feel  extremely weak or you faint.  See blood in your vomit.  Have vomit that looks like coffee grounds.  Have bloody or black stools or stools that look like tar.  Have a severe headache, a stiff neck, or both.  Have a rash.  Have severe pain, cramping, or bloating in your abdomen.  Have trouble breathing or you are breathing very quickly.  Have a fast heartbeat.  Have skin that feels cold and clammy.  Feel confused.  Have pain when you urinate.  Have signs of dehydration, such as:  Dark urine, very little urine, or no urine.  Cracked lips.  Dry mouth.  Sunken eyes.  Sleepiness.  Weakness.  Summary  Viral gastroenteritis is also known as the stomach flu. It can cause sudden watery diarrhea, fever, and vomiting.  This condition can be passed from person to person very easily (is contagious).  Take an ORS if told by your health care provider. This is a drink that is sold at pharmacies and retail stores.  Wash your hands often, especially after having diarrhea or vomiting. If soap and water are not available, use hand .  This information is not intended to replace advice given to you by your health care provider. Make sure you discuss any questions you have with your health care provider.  Document Revised: 06/05/2020 Document Reviewed: 10/23/2019  [employer] Patient Education © 2022 [employer]

## 2023-04-27 NOTE — PROGRESS NOTES
Chief Complaint   Patient presents with    Diarrhea    Fatigue    Nausea       Video Visit Reason:   Free Text Description: Any medication that helps  Subjective   George Hunter is a 42 y.o. male.     History of Present Illness  Diarrhea over 6 times in the last 24 hours with vomiting and nausea. No vomiting today. No fever but still having abdominal cramping.  Diarrhea   This is a new problem. The current episode started yesterday. The problem occurs 5 to 10 times per day. The problem has been rapidly improving. Associated symptoms include abdominal pain, sweats and vomiting. Pertinent negatives include no chills or fever. There are no known risk factors. He has tried bismuth subsalicylate for the symptoms. The treatment provided no relief.   Fatigue  This is a new problem. The current episode started yesterday. Associated symptoms include abdominal pain, a change in bowel habit, fatigue, nausea and vomiting. Pertinent negatives include no chills or fever. He has tried rest for the symptoms. The treatment provided no relief.   Nausea  This is a new problem. The current episode started yesterday. The problem occurs constantly. Associated symptoms include abdominal pain, a change in bowel habit, fatigue, nausea and vomiting. Pertinent negatives include no chills or fever. He has tried rest for the symptoms. The treatment provided no relief.     The following portions of the patient's history were reviewed and updated as appropriate: allergies, current medications, past medical history, and problem list.      Past Medical History:   Diagnosis Date    Allergies     Anxiety     Back injury     Broken bones     Deafness     Diabetes     Diabetes mellitus type I     Foot pain, bilateral 2018    Fracture     Hammertoe     Head injury     Hernia cerebri     Hypothyroidism     Neuropathy in diabetes 2009    Plantar fascial fibromatosis of both feet 2018    Toe pain, right      Social History     Socioeconomic History     Marital status:     Number of children: 1   Tobacco Use    Smoking status: Former     Packs/day: 0.50     Years: 10.00     Pack years: 5.00     Types: Cigarettes, Cigars    Smokeless tobacco: Never   Vaping Use    Vaping Use: Never used   Substance and Sexual Activity    Alcohol use: Not Currently    Drug use: Never    Sexual activity: Yes     Partners: Female     medication documentation: reviewed and updated with patient and   Current Outpatient Medications:     cetirizine-pseudoephedrine (ZyrTEC-D) 5-120 MG per 12 hr tablet, Take 1 tablet by mouth 2 (Two) Times a Day., Disp: 24 tablet, Rfl: 0    Continuous Blood Gluc Sensor (FreeStyle Nadine 3 Sensor) misc, USE 1 SENSOR EVERY 14 DAYS, Disp: 2 each, Rfl: 5    cyclobenzaprine (FLEXERIL) 10 MG tablet, Take 1 tablet by mouth 2 (Two) Times a Day As Needed for Muscle Spasms., Disp: 60 tablet, Rfl: 0    Euthyrox 25 MCG tablet, Take 1 tablet by mouth Daily., Disp: , Rfl:     Insulin Glargine (BASAGLAR KWIKPEN) 100 UNIT/ML injection pen, INJECT 40 UNITS SUBCUTANEOUSLY ONCE DAILY AS DIRECTED, Disp: 15 mL, Rfl: 2    lisinopril (Zestril) 2.5 MG tablet, Take 1 tablet by mouth Daily., Disp: 30 tablet, Rfl: 3    NovoLOG FlexPen 100 UNIT/ML solution pen-injector sc pen, INJECT 30 UNITS SUBCUTANEOUSLY AS DIRECTED THREE TIMES DAILY WITH MEALS, Disp: 15 mL, Rfl: 2    omeprazole (priLOSEC) 40 MG capsule, TAKE 1 CAPSULE BY MOUTH ONCE DAILY BEFORE A MEAL, Disp: 90 capsule, Rfl: 0    simvastatin (ZOCOR) 10 MG tablet, Take 1 tablet by mouth Every Night., Disp: 30 tablet, Rfl: 3    dicyclomine (BENTYL) 10 MG capsule, Take 1 capsule by mouth 4 (Four) Times a Day Before Meals & at Bedtime for 7 days., Disp: 28 capsule, Rfl: 0    ondansetron ODT (ZOFRAN-ODT) 4 MG disintegrating tablet, Place 1 tablet on the tongue Every 8 (Eight) Hours As Needed for Nausea or Vomiting for up to 7 days., Disp: 15 tablet, Rfl: 0  Review of Systems   Constitutional:  Positive for fatigue. Negative for  chills and fever.   Gastrointestinal:  Positive for abdominal pain, change in bowel habit, diarrhea, nausea and vomiting. Negative for abdominal distention, anal bleeding, blood in stool, constipation and rectal pain.     Objective   Physical Exam  Constitutional:       General: He is not in acute distress.     Appearance: He is not ill-appearing.   HENT:      Nose: Nose normal.   Eyes:      Conjunctiva/sclera: Conjunctivae normal.   Pulmonary:      Effort: Pulmonary effort is normal.   Neurological:      Mental Status: He is alert.   Psychiatric:         Speech: Speech normal.       Assessment & Plan   Diagnoses and all orders for this visit:    1. Gastroenteritis (Primary)  -     dicyclomine (BENTYL) 10 MG capsule; Take 1 capsule by mouth 4 (Four) Times a Day Before Meals & at Bedtime for 7 days.  Dispense: 28 capsule; Refill: 0  -     ondansetron ODT (ZOFRAN-ODT) 4 MG disintegrating tablet; Place 1 tablet on the tongue Every 8 (Eight) Hours As Needed for Nausea or Vomiting for up to 7 days.  Dispense: 15 tablet; Refill: 0                    Follow Up:  If your symptoms are not resolving by the completion of your treatment or are worsening, see your primary care provider for follow up. If you don't have a primary care provider, you may go to any Urgent Care for re-evaluation. If you develop any life threatening symptoms, go to the nearest Emergency Department immediately or call EMS.               The use of  Video Visit was utilized during this visit, using both KSKT and Twilio/Epic. The use of a video visit has been reviewed with the patient and verbal informed consent has been obtained. No technical difficulties occurred during the visit.    is located at [address] [place] [address] [address] [place] [address]  Provider is located at [place], [place]

## 2023-04-27 NOTE — LETTER
April 27, 2023     Patient: George Hunter   YOB: 1980   Date of Visit: 4/27/2023       To Whom It May Concern:    It is my medical opinion that George Hunter may return to work in 1 day.            Sincerely,        MALCOM Sauceda    CC: No Recipients

## 2023-05-18 RX ORDER — INSULIN GLARGINE 100 [IU]/ML
INJECTION, SOLUTION SUBCUTANEOUS
Qty: 15 ML | Refills: 0 | Status: SHIPPED | OUTPATIENT
Start: 2023-05-18

## 2023-05-25 RX ORDER — OMEPRAZOLE 40 MG/1
CAPSULE, DELAYED RELEASE ORAL
Qty: 90 CAPSULE | Refills: 0 | Status: SHIPPED | OUTPATIENT
Start: 2023-05-25

## 2023-08-14 RX ORDER — OMEPRAZOLE 40 MG/1
CAPSULE, DELAYED RELEASE ORAL
Qty: 90 CAPSULE | Refills: 0 | Status: SHIPPED | OUTPATIENT
Start: 2023-08-14

## 2023-08-16 ENCOUNTER — TELEMEDICINE (OUTPATIENT)
Dept: FAMILY MEDICINE CLINIC | Facility: TELEHEALTH | Age: 43
End: 2023-08-16
Payer: COMMERCIAL

## 2023-08-16 DIAGNOSIS — J06.9 ACUTE URI: Primary | ICD-10-CM

## 2023-08-16 PROCEDURE — 99213 OFFICE O/P EST LOW 20 MIN: CPT | Performed by: NURSE PRACTITIONER

## 2023-08-16 RX ORDER — BROMPHENIRAMINE MALEATE, PSEUDOEPHEDRINE HYDROCHLORIDE, AND DEXTROMETHORPHAN HYDROBROMIDE 2; 30; 10 MG/5ML; MG/5ML; MG/5ML
5 SYRUP ORAL 4 TIMES DAILY PRN
Qty: 118 ML | Refills: 0 | Status: SHIPPED | OUTPATIENT
Start: 2023-08-16 | End: 2023-08-26

## 2023-08-16 RX ORDER — METHYLPREDNISOLONE 4 MG/1
TABLET ORAL
Qty: 1 EACH | Refills: 0 | Status: SHIPPED | OUTPATIENT
Start: 2023-08-16

## 2023-08-16 NOTE — PATIENT INSTRUCTIONS
Recommend Covid testing. Will send a test with your prescriptions. Sometimes the test is falsely negative on the first day of illness, but viral illness is treated with alleviating symptoms with tylenol or ibuprofen for fever or body aches, cough medicine for cough, etc. Fluids and rest.    Upper Respiratory Infection, Adult  An upper respiratory infection (URI) affects the nose, throat, and upper airways that lead to the lungs. The most common type of URI is often called the common cold. URIs usually get better on their own, without medical treatment.  What are the causes?  A URI is caused by a germ (virus). You may catch these germs by:  Breathing in droplets from an infected person's cough or sneeze.  Touching something that has the germ on it (is contaminated) and then touching your mouth, nose, or eyes.  What increases the risk?  You are more likely to get a URI if:  You are very young or very old.  You have close contact with others, such as at work, school, or a health care facility.  You smoke.  You have long-term (chronic) heart or lung disease.  You have a weakened disease-fighting system (immune system).  You have nasal allergies or asthma.  You have a lot of stress.  You have poor nutrition.  What are the signs or symptoms?  Runny or stuffy (congested) nose.  Cough.  Sneezing.  Sore throat.  Headache.  Feeling tired (fatigue).  Fever.  Not wanting to eat as much as usual.  Pain in your forehead, behind your eyes, and over your cheekbones (sinus pain).  Muscle aches.  Redness or irritation of the eyes.  Pressure in the ears or face.  How is this treated?  URIs usually get better on their own within 7-10 days. Medicines cannot cure URIs, but your doctor may recommend certain medicines to help relieve symptoms, such as:  Over-the-counter cold medicines.  Medicines to reduce coughing (cough suppressants). Coughing is a type of defense against infection that helps to clear the nose, throat, windpipe, and  lungs (respiratory system). Take these medicines only as told by your doctor.  Medicines to lower your fever.  Follow these instructions at home:  Activity  Rest as needed.  If you have a fever, stay home from work or school until your fever is gone, or until your doctor says you may return to work or school.  You should stay home until you cannot spread the infection anymore (you are not contagious).  Your doctor may have you wear a face mask so you have less risk of spreading the infection.  Relieving symptoms  Rinse your mouth often with salt water. To make salt water, dissolve «-1 tsp (3-6 g) of salt in 1 cup (237 mL) of warm water.  Use a cool-mist humidifier to add moisture to the air. This can help you breathe more easily.  Eating and drinking    Drink enough fluid to keep your pee (urine) pale yellow.  Eat soups and other clear broths.  General instructions    Take over-the-counter and prescription medicines only as told by your doctor.  Do not smoke or use any products that contain nicotine or tobacco. If you need help quitting, ask your doctor.  Avoid being where people are smoking (avoid secondhand smoke).  Stay up to date on all your shots (immunizations), and get the flu shot every year.  Keep all follow-up visits.  How to prevent the spread of infection to others    Wash your hands with soap and water for at least 20 seconds. If you cannot use soap and water, use hand .  Avoid touching your mouth, face, eyes, or nose.  Cough or sneeze into a tissue or your sleeve or elbow. Do not cough or sneeze into your hand or into the air.  Contact a doctor if:  You are getting worse, not better.  You have any of these:  A fever or chills.  Brown or red mucus in your nose.  Yellow or brown fluid (discharge)coming from your nose.  Pain in your face, especially when you bend forward.  Swollen neck glands.  Pain when you swallow.  White areas in the back of your throat.  Get help right away if:  You have  shortness of breath that gets worse.  You have very bad or constant:  Headache.  Ear pain.  Pain in your forehead, behind your eyes, and over your cheekbones (sinus pain).  Chest pain.  You have long-lasting (chronic) lung disease along with any of these:  Making high-pitched whistling sounds when you breathe, most often when you breathe out (wheezing).  Long-lasting cough (more than 14 days).  Coughing up blood.  A change in your usual mucus.  You have a stiff neck.  You have changes in your:  Vision.  Hearing.  Thinking.  Mood.  These symptoms may be an emergency. Get help right away. Call 911.  Do not wait to see if the symptoms will go away.  Do not drive yourself to the hospital.  Summary  An upper respiratory infection (URI) is caused by a germ (virus). The most common type of URI is often called the common cold.  URIs usually get better within 7-10 days.  Take over-the-counter and prescription medicines only as told by your doctor.  This information is not intended to replace advice given to you by your health care provider. Make sure you discuss any questions you have with your health care provider.  Document Revised: 07/20/2022 Document Reviewed: 07/20/2022  [employer] Patient Education c 2023 [employer]

## 2023-08-16 NOTE — PROGRESS NOTES
Chief Complaint   Patient presents with    Sore Throat    Nasal Congestion       Video Visit Reason:   Free Text Description: Getting the right medicine  Subjective   George Hunter is a 43 y.o. male.     History of Present Illness  Sore throat, ear pain bilaterally, nasal congestion this morning with no runny nose, cough, fever but reports that his son has had a cough and illness for a few days too.   Sore Throat   This is a new problem. The current episode started today. The problem has been rapidly worsening. There has been no fever. Associated symptoms include congestion, ear pain, a hoarse voice, a plugged ear sensation and swollen glands. Pertinent negatives include no coughing, shortness of breath or trouble swallowing. He has tried NSAIDs for the symptoms. The treatment provided no relief.     The following portions of the patient's history were reviewed and updated as appropriate: allergies, current medications, past medical history, and problem list.      Past Medical History:   Diagnosis Date    Allergies     Anxiety     Back injury     Broken bones     Deafness     Diabetes     Diabetes mellitus type I     Foot pain, bilateral 2018    Fracture     Hammertoe     Head injury     Hernia cerebri     Hyperlipidemia     Hypothyroidism     Neuropathy in diabetes 2009    Plantar fascial fibromatosis of both feet 2018    Toe pain, right      Social History     Socioeconomic History    Marital status:     Number of children: 1   Tobacco Use    Smoking status: Former     Packs/day: 0.50     Years: 10.00     Pack years: 5.00     Types: Cigarettes, Cigars    Smokeless tobacco: Never   Vaping Use    Vaping Use: Never used   Substance and Sexual Activity    Alcohol use: Not Currently    Drug use: Never    Sexual activity: Yes     Partners: Female     medication documentation: reviewed and updated with patient and   Current Outpatient Medications:     cetirizine-pseudoephedrine (ZyrTEC-D) 5-120 MG per 12 hr  tablet, Take 1 tablet by mouth 2 (Two) Times a Day., Disp: 24 tablet, Rfl: 0    Continuous Blood Gluc Sensor (FreeStyle Nadine 3 Sensor) misc, USE 1 SENSOR EVERY 14 DAYS, Disp: 2 each, Rfl: 5    Euthyrox 25 MCG tablet, Take 1 tablet by mouth Daily., Disp: , Rfl:     Insulin Glargine (BASAGLAR KWIKPEN) 100 UNIT/ML injection pen, INJECT 40 UNITS SUBCUTANEOUSLY ONCE DAILY, Disp: 15 mL, Rfl: 2    lisinopril (Zestril) 2.5 MG tablet, Take 1 tablet by mouth Daily., Disp: 30 tablet, Rfl: 3    NovoLOG FlexPen 100 UNIT/ML solution pen-injector sc pen, INJECT 30 UNITS SUBCUTANEOUSLY AS DIRECTED THREE TIMES DAILY WITH MEALS, Disp: 15 mL, Rfl: 2    omeprazole (priLOSEC) 40 MG capsule, TAKE 1 CAPSULE BY MOUTH ONCE DAILY BEFORE A MEAL, Disp: 90 capsule, Rfl: 0    simvastatin (ZOCOR) 10 MG tablet, Take 1 tablet by mouth Every Night., Disp: 30 tablet, Rfl: 3    brompheniramine-pseudoephedrine-DM 30-2-10 MG/5ML syrup, Take 5 mL by mouth 4 (Four) Times a Day As Needed for Congestion or Cough for up to 10 days., Disp: 118 mL, Rfl: 0    COVID-19 Antigen Test kit, 1 each by In Vitro route 1 (One) Time for 1 dose., Disp: 1 kit, Rfl: 1    methylPREDNISolone (MEDROL) 4 MG dose pack, Take as directed on package instructions., Disp: 1 each, Rfl: 0  Review of Systems   Constitutional:  Positive for activity change and fatigue. Negative for chills and fever.   HENT:  Positive for congestion, ear pain, hoarse voice, sinus pressure and sore throat. Negative for rhinorrhea and trouble swallowing.    Respiratory:  Negative for cough, shortness of breath and wheezing.    Musculoskeletal:  Positive for myalgias.   Hematological:  Positive for adenopathy.     Objective   Physical Exam  Constitutional:       General: He is not in acute distress.     Appearance: He is not ill-appearing.   HENT:      Nose: Congestion present.      Mouth/Throat:      Pharynx: Posterior oropharyngeal erythema present.   Eyes:      Conjunctiva/sclera: Conjunctivae normal.    Pulmonary:      Effort: Pulmonary effort is normal.   Lymphadenopathy:      Head:      Right side of head: Tonsillar adenopathy present.      Left side of head: Tonsillar adenopathy present.      Cervical: Cervical adenopathy (patient guided exam) present.   Neurological:      Mental Status: He is alert.   Psychiatric:         Mood and Affect: Mood normal.         Speech: Speech normal.       Assessment & Plan   Diagnoses and all orders for this visit:    1. URI (Primary)  -     brompheniramine-pseudoephedrine-DM 30-2-10 MG/5ML syrup; Take 5 mL by mouth 4 (Four) Times a Day As Needed for Congestion or Cough for up to 10 days.  Dispense: 118 mL; Refill: 0  -     COVID-19 Antigen Test kit; 1 each by In Vitro route 1 (One) Time for 1 dose.  Dispense: 1 kit; Refill: 1  -     methylPREDNISolone (MEDROL) 4 MG dose pack; Take as directed on package instructions.  Dispense: 1 each; Refill: 0                    Follow Up:  If your symptoms are not resolving by the completion of your treatment or are worsening, see your primary care provider for follow up. If you don't have a primary care provider, you may go to any Urgent Care for re-evaluation. If you develop any life threatening symptoms, go to the nearest Emergency Department immediately or call EMS.               The use of  Video Visit was utilized during this visit, using both [employer] and [employer]. The use of a video visit has been reviewed with the patient and verbal informed consent has been obtained. No technical difficulties occurred during the visit.    is located at [address] [place] [address] [address] [place] [address]  Provider is located at [place], [place]

## 2023-08-16 NOTE — LETTER
August 16, 2023     Patient: George Hunter   YOB: 1980   Date of Visit: 8/16/2023       To Whom It May Concern:    It is my medical opinion that George Hunter may return to work 8/18/2023.            Sincerely,        MALCOM Sauceda    CC: No Recipients

## 2023-09-11 RX ORDER — INSULIN ASPART 100 [IU]/ML
INJECTION, SOLUTION INTRAVENOUS; SUBCUTANEOUS
Qty: 15 ML | Refills: 1 | Status: SHIPPED | OUTPATIENT
Start: 2023-09-11

## 2023-10-02 ENCOUNTER — OFFICE VISIT (OUTPATIENT)
Dept: FAMILY MEDICINE CLINIC | Facility: CLINIC | Age: 43
End: 2023-10-02
Payer: COMMERCIAL

## 2023-10-02 VITALS
SYSTOLIC BLOOD PRESSURE: 126 MMHG | BODY MASS INDEX: 26.35 KG/M2 | OXYGEN SATURATION: 96 % | TEMPERATURE: 99.2 F | WEIGHT: 198.8 LBS | HEART RATE: 94 BPM | DIASTOLIC BLOOD PRESSURE: 76 MMHG | HEIGHT: 73 IN

## 2023-10-02 DIAGNOSIS — J06.9 UPPER RESPIRATORY TRACT INFECTION, UNSPECIFIED TYPE: ICD-10-CM

## 2023-10-02 DIAGNOSIS — R05.1 ACUTE COUGH: Primary | ICD-10-CM

## 2023-10-02 LAB
EXPIRATION DATE: NORMAL
FLUAV AG UPPER RESP QL IA.RAPID: NOT DETECTED
FLUBV AG UPPER RESP QL IA.RAPID: NOT DETECTED
INTERNAL CONTROL: NORMAL
Lab: NORMAL
SARS-COV-2 AG UPPER RESP QL IA.RAPID: NOT DETECTED

## 2023-10-02 PROCEDURE — 99213 OFFICE O/P EST LOW 20 MIN: CPT | Performed by: NURSE PRACTITIONER

## 2023-10-02 PROCEDURE — 87428 SARSCOV & INF VIR A&B AG IA: CPT | Performed by: NURSE PRACTITIONER

## 2023-10-02 RX ORDER — AZITHROMYCIN 250 MG/1
TABLET, FILM COATED ORAL
Qty: 6 TABLET | Refills: 0 | Status: SHIPPED | OUTPATIENT
Start: 2023-10-02

## 2023-10-02 NOTE — PROGRESS NOTES
"Chief Complaint  Cough    Subjective         George Hunter presents to [facility]  Patient presents to the office today with complaints of sinus pressure congestion sore throat and ear pain.  Patient states he is also had a persistent nonproductive cough.  States symptoms started on Saturday.  He does state that his child has been sick for a few months.  He denies any fevers at this time.  He denies any nausea vomiting diarrhea.    Cough       Objective     Vitals:    10/02/23 1133   BP: 126/76   BP Location: Right arm   Patient Position: Sitting   Cuff Size: Adult   Pulse: 94   Temp: 99.2 °F (37.3 °C)   TempSrc: Temporal   SpO2: 96%   Weight: 90.2 kg (198 lb 12.8 oz)   Height: 185.4 cm (73\")      Body mass index is 26.23 kg/m².             Physical Exam  Vitals reviewed.   Constitutional:       Appearance: Normal appearance.   HENT:      Right Ear: Hearing, tympanic membrane, ear canal and external ear normal.      Left Ear: Hearing, tympanic membrane, ear canal and external ear normal. Tenderness present.      Ears:      Comments: Erythema noted to the left ear canal     Nose:      Right Sinus: Maxillary sinus tenderness present.      Mouth/Throat:      Pharynx: Posterior oropharyngeal erythema present.   Cardiovascular:      Rate and Rhythm: Normal rate and regular rhythm.      Pulses: Normal pulses.      Heart sounds: Normal heart sounds, S1 normal and S2 normal. No murmur heard.  Pulmonary:      Effort: Pulmonary effort is normal. No respiratory distress.      Breath sounds: Normal breath sounds.   Skin:     General: Skin is warm and dry.   Neurological:      Mental Status: He is alert and oriented to person, place, and time.   Psychiatric:         Attention and Perception: Attention normal.         Mood and Affect: Mood normal.         Behavior: Behavior normal.        Result Review :   The following data was reviewed by: MALCOM Farias on " 10/02/2023:      Procedures    Assessment and Plan   Diagnoses and all orders for this visit:    1. Acute cough (Primary)  -     POCT SARS-CoV-2 Antigen SLIME + Flu    2. Upper respiratory tract infection, unspecified type  -     azithromycin (Zithromax Z-Chas) 250 MG tablet; Take 2 tablets by mouth on day 1, then 1 tablet daily on days 2-5  Dispense: 6 tablet; Refill: 0      Did explain the patient needs to rest and increase fluid intake.    Follow Up   Return if symptoms worsen or fail to improve.  Patient was given instructions and counseling regarding his condition or for health maintenance advice. Please see specific information pulled into the AVS if appropriate.

## 2023-10-05 NOTE — PROGRESS NOTES
Chief Complaint  Diabetes (Follow up, med mgt , A1c eval )    Referred By: MALCOM Farias presents to [facility] for diabetes medication management    History of Present Illness    Visit type:  follow-up  Diabetes type:  Type 1  Current diabetes status/concerns/issues:  He feels his blood sugars have been higher.  He admits to sometimes forgetting to take his insulin.  Other health concerns: He is concerned about his memory at times.  He does have a history of repeat concussions in the past.  He is on an antibiotic due to left ear infection.  Current Diabetes symptoms:    Polyuria: No   Polydipsia: No   Polyphagia: No   Blurred vision: Yes when his sugar is high   Excessive fatigue: No  Known Diabetes complications:  Neuropathy: None; Location: N/A  Renal: None  Eyes: None; Location: N/A  Amputation/Wounds: None  GI: None  Cardiovascular: None  ED: None  Other: None  Hypoglycemia:  Level 1 hypoglycemia (54 mg/dL - 70 mg/dL); Frequency - rare occasion and Level 3 (altered mental or physical status requiring assistance); Frequency - he reports previously having a couple of events where EMS was called because he took his mealtime insulin then fell asleep and didn't eat.  Hypoglycemia Symptoms:  sweating  Current diabetes treatment:  Basaglar 20 units twice a day and Humalog using a 1-10 carbohydrate ratio to 1-20 correction for glucose levels greater than 120 mg/dL.  He reports averaging around 7 to 10 units with most meals    Blood glucose device:  Meter  Blood glucose monitoring frequency:  2 -3  Blood glucose range/average:   170s  Glucose Source: Patient Reported  Diet:  Limits high carb/sweet foods, Avoids sugary drinks  Activity/Exercise:  None    Past Medical History:   Diagnosis Date    Allergic     Allergies     Anxiety     Back injury     Broken bones     Deafness     Diabetes     Diabetes mellitus type I     Foot pain, bilateral 2018     Fracture     Hammertoe     Head injury     Hernia cerebri     Hyperlipidemia     Hypothyroidism     Neuropathy in diabetes 2009    Plantar fascial fibromatosis of both feet 2018    Toe pain, right      Past Surgical History:   Procedure Laterality Date    HERNIA REPAIR      TOENAIL EXCISION  2021     Family History   Family history unknown: Yes     Social History     Socioeconomic History    Marital status:     Number of children: 1   Tobacco Use    Smoking status: Former     Packs/day: 0.50     Years: 10.00     Pack years: 5.00     Types: Cigarettes, Cigars    Smokeless tobacco: Never   Vaping Use    Vaping Use: Never used   Substance and Sexual Activity    Alcohol use: Not Currently    Drug use: Never    Sexual activity: Yes     Partners: Female     Allergies   Allergen Reactions    Penicillins Anaphylaxis     Childhood allergy      Codeine Itching       Current Outpatient Medications:     azithromycin (Zithromax Z-Chas) 250 MG tablet, Take 2 tablets by mouth on day 1, then 1 tablet daily on days 2-5, Disp: 6 tablet, Rfl: 0    cetirizine-pseudoephedrine (ZyrTEC-D) 5-120 MG per 12 hr tablet, Take 1 tablet by mouth 2 (Two) Times a Day., Disp: 24 tablet, Rfl: 0    Continuous Blood Gluc Sensor (FreeStyle Nadine 3 Sensor) misc, USE 1 SENSOR EVERY 14 DAYS, Disp: 2 each, Rfl: 5    Euthyrox 25 MCG tablet, Take 1 tablet by mouth Daily., Disp: , Rfl:     Insulin Glargine (BASAGLAR KWIKPEN) 100 UNIT/ML injection pen, INJECT 40 UNITS SUBCUTANEOUSLY ONCE DAILY, Disp: 15 mL, Rfl: 2    lisinopril (Zestril) 2.5 MG tablet, Take 1 tablet by mouth Daily., Disp: 30 tablet, Rfl: 3    NovoLOG FlexPen 100 UNIT/ML solution pen-injector sc pen, INJECT 30 UNITS SUBCUTANEOUSLY THREE TIMES DAILY AS DIRECTED WITH MEALS, Disp: 15 mL, Rfl: 1    omeprazole (priLOSEC) 40 MG capsule, TAKE 1 CAPSULE BY MOUTH ONCE DAILY BEFORE A MEAL, Disp: 90 capsule, Rfl: 0    simvastatin (ZOCOR) 10 MG tablet, Take 1 tablet by mouth Every Night., Disp: 30  "tablet, Rfl: 3    Objective     Vitals:    10/06/23 0855   BP: 121/74   BP Location: Right arm   Patient Position: Sitting   Cuff Size: Adult   Pulse: 71   Temp: 97.8 °F (36.6 °C)   SpO2: 96%   Weight: 92.5 kg (204 lb)   Height: 185.4 cm (73\")   PainSc:   3     Body mass index is 26.91 kg/m².    Physical Exam  Constitutional:       Appearance: Normal appearance.      Comments: Overweight (BMI 25 - 29.9) Pt Current BMI = 26.91    HENT:      Head: Normocephalic and atraumatic.      Right Ear: External ear normal.      Left Ear: External ear normal.      Nose: Nose normal.   Eyes:      Extraocular Movements: Extraocular movements intact.      Conjunctiva/sclera: Conjunctivae normal.   Pulmonary:      Effort: Pulmonary effort is normal.   Musculoskeletal:         General: Normal range of motion.      Cervical back: Normal range of motion.   Skin:     General: Skin is warm and dry.   Neurological:      General: No focal deficit present.      Mental Status: He is alert and oriented to person, place, and time. Mental status is at baseline.   Psychiatric:         Mood and Affect: Mood normal.         Behavior: Behavior normal.         Thought Content: Thought content normal.         Judgment: Judgment normal.           Result Review :   The following data was reviewed by: MALCOM Silverman on 10/06/2023:    Most Recent A1C          10/6/2023    09:03   HGBA1C Most Recent   Hemoglobin A1C 9.4        A1C Last 3 Results          10/13/2022    08:52 10/6/2023    09:03   HGBA1C Last 3 Results   Hemoglobin A1C 8.4  9.4      A1c collected in the office today is 9.4%, indicating Uncontrolled Type I diabetes.  This result is up from the prior result of 8.4% collected on 10/13/22     Glucose   Date Value Ref Range Status   10/06/2023 150 (H) 70 - 99 mg/dL Final     Comment:     Serial Number: [id number]:  [id number]     Point of care glucose in the office today is within normal limits for nonfasting " glucose    Creatinine   Date Value Ref Range Status   07/11/2023 0.74 (L) 0.76 - 1.27 mg/dL Final   02/05/2023 1.01 0.76 - 1.27 mg/dL Final     eGFR   Date Value Ref Range Status   07/11/2023 115.3 >60.0 mL/min/1.73 Final   02/05/2023 94.6 >60.0 mL/min/1.73 Final     Labs collected on 7/11/2023 show Normal values          Assessment: He has had an increase in his A1c since the prior evaluation.  He has been off of his continuous glucose sensor recently due to cost concerns.  He reports having had some severe hypoglycemia but it was due to him taking his mealtime insulin then following the sleep and failing to eat.  Otherwise his glucose levels have been mostly elevated.      Diagnoses and all orders for this visit:    1. Uncontrolled type 1 diabetes mellitus with hyperglycemia (Primary)  -     POC Glycosylated Hemoglobin (Hb A1C)    2. Overweight (BMI 25.0-29.9)    Other orders  -     POC Glucose        Plan: We will increase his Basaglar to 22 units twice a day.  No other changes will be made to his treatment plan today.  When possible, the patient is encouraged to restart his continuous glucose sensor so we can monitor him more closely and make more aggressive adjustments as needed to control his A1c.    The patient will monitor his blood glucose levels 3-4 times a day or using the CGM.  If he develops problematic hyperglycemia or hypoglycemia or adverse drug reactions, he will contact the office for further instructions.        Follow Up     Return in about 3 months (around 1/6/2024) for Medication Management.    Patient was given instructions and counseling regarding his condition or for health maintenance advice. Please see specific information pulled into the AVS if appropriate.     Indu Lloyd, APRN  10/06/2023      Dictated Utilizing Dragon Dictation.  Please note that portions of this note were completed with a voice recognition program.  Part of this note may be an electronic  transcription/translation of spoken language to printed text using the Dragon Dictation System.

## 2023-10-06 ENCOUNTER — OFFICE VISIT (OUTPATIENT)
Dept: DIABETES SERVICES | Facility: HOSPITAL | Age: 43
End: 2023-10-06
Payer: COMMERCIAL

## 2023-10-06 VITALS
TEMPERATURE: 97.8 F | OXYGEN SATURATION: 96 % | DIASTOLIC BLOOD PRESSURE: 74 MMHG | BODY MASS INDEX: 27.04 KG/M2 | WEIGHT: 204 LBS | HEIGHT: 73 IN | HEART RATE: 71 BPM | SYSTOLIC BLOOD PRESSURE: 121 MMHG

## 2023-10-06 DIAGNOSIS — E10.65 UNCONTROLLED TYPE 1 DIABETES MELLITUS WITH HYPERGLYCEMIA: Primary | ICD-10-CM

## 2023-10-06 DIAGNOSIS — E66.3 OVERWEIGHT (BMI 25.0-29.9): ICD-10-CM

## 2023-10-06 LAB
EXPIRATION DATE: ABNORMAL
GLUCOSE BLDC GLUCOMTR-MCNC: 150 MG/DL (ref 70–99)
HBA1C MFR BLD: 9.4 %
Lab: ABNORMAL

## 2023-10-06 PROCEDURE — 82948 REAGENT STRIP/BLOOD GLUCOSE: CPT | Performed by: NURSE PRACTITIONER

## 2023-10-06 PROCEDURE — G0463 HOSPITAL OUTPT CLINIC VISIT: HCPCS | Performed by: NURSE PRACTITIONER

## 2023-10-13 ENCOUNTER — APPOINTMENT (OUTPATIENT)
Dept: GENERAL RADIOLOGY | Facility: HOSPITAL | Age: 43
End: 2023-10-13
Payer: COMMERCIAL

## 2023-10-13 ENCOUNTER — HOSPITAL ENCOUNTER (EMERGENCY)
Facility: HOSPITAL | Age: 43
Discharge: HOME | End: 2023-10-13
Attending: EMERGENCY MEDICINE
Payer: COMMERCIAL

## 2023-10-13 VITALS
OXYGEN SATURATION: 96 % | TEMPERATURE: 98.9 F | SYSTOLIC BLOOD PRESSURE: 118 MMHG | HEART RATE: 73 BPM | DIASTOLIC BLOOD PRESSURE: 80 MMHG | RESPIRATION RATE: 18 BRPM

## 2023-10-13 DIAGNOSIS — M50.30 DDD (DEGENERATIVE DISC DISEASE), CERVICAL: ICD-10-CM

## 2023-10-13 DIAGNOSIS — M54.12 CERVICAL RADICULOPATHY: Primary | ICD-10-CM

## 2023-10-13 DIAGNOSIS — M89.8X1 PAIN OF LEFT SCAPULA: ICD-10-CM

## 2023-10-13 LAB — GLUCOSE BLDC GLUCOMTR-MCNC: 265 MG/DL (ref 70–99)

## 2023-10-13 PROCEDURE — 99283 EMERGENCY DEPT VISIT LOW MDM: CPT

## 2023-10-13 PROCEDURE — 96372 THER/PROPH/DIAG INJ SC/IM: CPT

## 2023-10-13 PROCEDURE — 25010000002 KETOROLAC TROMETHAMINE PER 15 MG

## 2023-10-13 PROCEDURE — 72050 X-RAY EXAM NECK SPINE 4/5VWS: CPT

## 2023-10-13 PROCEDURE — 82948 REAGENT STRIP/BLOOD GLUCOSE: CPT

## 2023-10-13 PROCEDURE — 97110 THERAPEUTIC EXERCISES: CPT | Performed by: PHYSICAL THERAPIST

## 2023-10-13 PROCEDURE — 97161 PT EVAL LOW COMPLEX 20 MIN: CPT | Performed by: PHYSICAL THERAPIST

## 2023-10-13 RX ORDER — CYCLOBENZAPRINE HCL 10 MG
10 TABLET ORAL ONCE
Status: COMPLETED | OUTPATIENT
Start: 2023-10-13 | End: 2023-10-13

## 2023-10-13 RX ORDER — CYCLOBENZAPRINE HCL 10 MG
10 TABLET ORAL 3 TIMES DAILY
Qty: 20 TABLET | Refills: 0 | Status: SHIPPED | OUTPATIENT
Start: 2023-10-13

## 2023-10-13 RX ORDER — KETOROLAC TROMETHAMINE 30 MG/ML
30 INJECTION, SOLUTION INTRAMUSCULAR; INTRAVENOUS ONCE
Status: COMPLETED | OUTPATIENT
Start: 2023-10-13 | End: 2023-10-13

## 2023-10-13 RX ORDER — KETOROLAC TROMETHAMINE 10 MG/1
10 TABLET, FILM COATED ORAL EVERY 6 HOURS PRN
Qty: 15 TABLET | Refills: 0 | Status: SHIPPED | OUTPATIENT
Start: 2023-10-13

## 2023-10-13 RX ORDER — PREDNISONE 20 MG/1
60 TABLET ORAL ONCE
Status: DISCONTINUED | OUTPATIENT
Start: 2023-10-13 | End: 2023-10-13

## 2023-10-13 RX ADMIN — KETOROLAC TROMETHAMINE 30 MG: 60 INJECTION, SOLUTION INTRAMUSCULAR at 14:25

## 2023-10-13 RX ADMIN — CYCLOBENZAPRINE 10 MG: 10 TABLET, FILM COATED ORAL at 14:25

## 2023-10-13 NOTE — THERAPY EVALUATION
Patient Name: George Hunter  : 1980    MRN: [id number]                              Today's Date: 10/13/2023       Admit Date: 10/13/2023    Visit Dx:     ICD-10-CM ICD-9-CM   1. Cervical radiculopathy  M54.12 723.4   2. Pain of left scapula  M89.8X1 733.90     There is no problem list on file for this patient.    Past Medical History:   Diagnosis Date    Allergic     Allergies     Anxiety     Back injury     Broken bones     Deafness     Diabetes     Diabetes mellitus type I     Foot pain, bilateral 2018    Fracture     Hammertoe     Head injury     Hernia cerebri     Hyperlipidemia     Hypothyroidism     Neuropathy in diabetes 2009    Plantar fascial fibromatosis of both feet 2018    Toe pain, right      Past Surgical History:   Procedure Laterality Date    HERNIA REPAIR      TOENAIL EXCISION        General Information       Row Name 10/13/23 1501          Physical Therapy Time and Intention    Document Type evaluation  -LR     Mode of Treatment individual therapy  -LR       Row Name 10/13/23 1501          General Information    Patient Profile Reviewed yes  -LR     Prior Level of Function independent:  -LR               User Key  (r) = Recorded By, (t) = Taken By, (c) = Cosigned By      Initials Name Provider Type    LR Hilaria Jovel, PT Physical Therapist                History: Patient reports he has had pain surrounding his left shoulder blade and shoulder for 6 days now.  He is also starting to get pain up into his neck.  He reports he has intermittent numbness/tingling into the back of his left upper arm and sometimes down into his hand.  He reports he has a flareup of this pain 3-4 times per year.  Patient is right-hand dominant.  Patient reports pain is increased with movement.  Patient did physical therapy 2 to 3 years ago for this pain.  Pain is currently 9/10.  Patient has tried heat, ice, TENS, IcyHot, and ibuprofen to help with pain.    Objective:  Posture: Forward head, rounded  shoulders    Palpation: Tender to palpation at left upper trap, left levator scapula, upper thoracic spinous processes and thoracic paraspinals, left lats    ROM:  Active Cervical ROM:  Flexion: 50%  Extension: WNL  L Side bendin%  R Side bendin%  L Rotation: WNL  R Rotation: WNL    B UE ROM WNL  Tightness in L upper trap and levator  Significant hypomobility through upper thoracic spine with PA mobilizations     Strength:  L Shoulder MMT:   R Shoulder MMT:  Flexion: 5   Flexion: 5  Abduction: 5   Abduction: 5  External Rotation: 5  External Rotation: 5  Internal Rotation: 5  Internal Rotation: 5    L Elbow MMT:   R Elbow MMT:  Flexion: 5   Flexion: 5  Extension: 5   Extension: 5    L Wrist MMT:    R Wrist MMT:  Flexion: 5   Flexion: 5  Extension: 5   Extension: 5    Normal bilateral  strength    Special Tests:  Spurlings: Positive on left, negative on right  Cervical Distraction Test: Negative     Sensation: Bilateral upper extremity sensation intact to light touch    Assessment/Plan:   Pt presents with a diagnosis of left scapular pain and has signs and symptoms consistent with cervical radiculopathy and periscapular tightness/tenderness that are limiting his ability to reach and lift.  The patient was educated in multiple cervical and thoracic mobility exercises.  He was also educated in scapular strengthening exercises.  Referral for outpatient physical therapy will be placed today.  Patient was provided with a HEP handout.    Goals:   LTG 1: The patient will be independent in HEP in order to decrease pain and improve tolerance to functional activities.  STATUS: Met    Interventions:   Manual Therapy: Not performed    Therapeutic Exercises: Upper trap stretch (1x20 second), levator stretch (1x20 seconds), cat/camel (5X), quadruped thoracic rotation with thread the needle (5X), bent over row, bent over shoulder extension, bent over horizontal abduction, LAT stretch on doorway    Modalities: not  performed      Outcome Measures       Row Name 10/13/23 1501          Optimal Instrument    Optimal Instrument Optimal - 3  -LR     Grasping 2  -LR     Lifting 2  -LR     Carrying 2  -LR     From the list, choose the 3 activities you would most like to be able to do without any difficulty Lifting;Carrying;Grasping  -LR     Total Score Optimal - 3 6  -LR       Row Name 10/13/23 1501          Functional Assessment    Outcome Measure Options Optimal Instrument  -LR               User Key  (r) = Recorded By, (t) = Taken By, (c) = Cosigned By      Initials Name Provider Type    LR Hilaria Jovel, PT Physical Therapist                     Time Calculation:   PT Evaluation Complexity  History, PT Evaluation Complexity: 3 or more personal factors and/or comorbidities  Examination of Body Systems (PT Eval Complexity): 1-2 elements  Clinical Presentation (PT Evaluation Complexity): stable  Clinical Decision Making (PT Evaluation Complexity): low complexity  Overall Complexity (PT Evaluation Complexity): low complexity     PT Charges       Row Name 10/13/23 1502             Time Calculation    PT Received On 10/13/23  -LR         Time Calculation- PT    Total Timed Code Minutes- PT 35 minute(s)  -LR         Timed Charges    11700 - PT Therapeutic Exercise Minutes 12  -LR         Untimed Charges    PT Eval/Re-eval Minutes 23  -LR         Total Minutes    Timed Charges Total Minutes 12  -LR      Untimed Charges Total Minutes 23  -LR       Total Minutes 35  -LR                User Key  (r) = Recorded By, (t) = Taken By, (c) = Cosigned By      Initials Name Provider Type    Hilaria Bass, PT Physical Therapist                  Therapy Charges for Today       Code Description Service Date Service Provider Modifiers Qty    29789379120 HC PT THER PROC EA 15 MIN 10/13/2023 Hilaria Jovel, PT GP 1    91743897165 HC PT EVAL LOW COMPLEXITY 2 10/13/2023 Hilaria Jovel, PT GP 1            PT G-Codes  Outcome Measure Options: Optimal  Instrument       Hilaria Jovel, PT  10/13/2023

## 2023-10-13 NOTE — ED PROVIDER NOTES
Time: 2:16 PM EDT  Date of encounter:  10/13/2023  Independent Historian/Clinical History and Information was obtained by:   Patient    History is limited by: N/A    Chief Complaint   Patient presents with    Shoulder Pain     Left shoulder and scapula nki         History of Present Illness:  Patient is a 43 y.o. year old male who presents to the emergency department for evaluation of neck pain with left shoulder pain that started after he woke up on Saturday.  He has been taking ibuprofen intermittently.  Denies recent fall or injury.  Patient states that he does have paresthesia going from his neck down to mid upper left arm.  Has a sedentary job.    Patient Care Team  Primary Care Provider: Jak Colindres APRN    Past Medical History:     Allergies   Allergen Reactions    Penicillins Anaphylaxis     Childhood allergy      Codeine Itching     Past Medical History:   Diagnosis Date    Allergic     Allergies     Anxiety     Back injury     Broken bones     Deafness     Diabetes     Diabetes mellitus type I     Foot pain, bilateral 2018    Fracture     Hammertoe     Head injury     Hernia cerebri     Hyperlipidemia     Hypothyroidism     Neuropathy in diabetes 2009    Plantar fascial fibromatosis of both feet 2018    Toe pain, right      Past Surgical History:   Procedure Laterality Date    HERNIA REPAIR      TOENAIL EXCISION  2021     Family History   Family history unknown: Yes       Home Medications:  Prior to Admission medications    Medication Sig Start Date End Date Taking? Authorizing Provider   azithromycin (Zithromax Z-Chas) 250 MG tablet Take 2 tablets by mouth on day 1, then 1 tablet daily on days 2-5 10/2/23   Jak Colindres APRN   cetirizine-pseudoephedrine (ZyrTEC-D) 5-120 MG per 12 hr tablet Take 1 tablet by mouth 2 (Two) Times a Day. 1/15/22   Annia Jackson MD   Continuous Blood Gluc Sensor (FreeStyle Nadine 3 Sensor) misc USE 1 SENSOR EVERY 14 DAYS 4/26/23   Indu Lloyd APRN   Euthyrox 25 MCG  tablet Take 1 tablet by mouth Daily. 7/12/21   Provider, MD Rudolph   Insulin Glargine (BASAGLAR KWIKPEN) 100 UNIT/ML injection pen INJECT 40 UNITS SUBCUTANEOUSLY ONCE DAILY 7/10/23   Indu Lloyd APRN   lisinopril (Zestril) 2.5 MG tablet Take 1 tablet by mouth Daily. 11/11/21   Indu Lloyd APRN   NovoLOG FlexPen 100 UNIT/ML solution pen-injector sc pen INJECT 30 UNITS SUBCUTANEOUSLY THREE TIMES DAILY AS DIRECTED WITH MEALS 9/11/23   Indu Lloyd APRN   omeprazole (priLOSEC) 40 MG capsule TAKE 1 CAPSULE BY MOUTH ONCE DAILY BEFORE A MEAL 8/14/23   Jak Colindres APRN   simvastatin (ZOCOR) 10 MG tablet Take 1 tablet by mouth Every Night. 11/11/21   Indu Lloyd APRN        Social History:   Social History     Tobacco Use    Smoking status: Former     Packs/day: 0.50     Years: 10.00     Additional pack years: 0.00     Total pack years: 5.00     Types: Cigarettes, Cigars    Smokeless tobacco: Never   Vaping Use    Vaping Use: Never used   Substance Use Topics    Alcohol use: Not Currently    Drug use: Never         Review of Systems:  Review of Systems   Constitutional: Negative.    HENT: Negative.     Eyes: Negative.    Respiratory: Negative.     Cardiovascular: Negative.    Gastrointestinal: Negative.    Endocrine: Negative.    Genitourinary: Negative.    Musculoskeletal:  Positive for arthralgias and neck pain.   Skin: Negative.    Allergic/Immunologic: Negative.    Neurological: Negative.    Hematological: Negative.    Psychiatric/Behavioral: Negative.          Physical Exam:  /80 (Patient Position: Sitting)   Pulse 73   Temp 98.9 øF (37.2 øC) (Oral)   Resp 18   SpO2 96%         Physical Exam  Vitals and nursing note reviewed.   Constitutional:       Appearance: Normal appearance. He is normal weight.   HENT:      Head: Normocephalic and atraumatic.      Nose: Nose normal.      Mouth/Throat:      Mouth: Mucous membranes are moist.   Eyes:      Extraocular Movements:  Extraocular movements intact.      Conjunctiva/sclera: Conjunctivae normal.      Pupils: Pupils are equal, round, and reactive to light.   Cardiovascular:      Rate and Rhythm: Normal rate and regular rhythm.      Heart sounds: Normal heart sounds.   Pulmonary:      Effort: Pulmonary effort is normal.      Breath sounds: Normal breath sounds.   Musculoskeletal:         General: Normal range of motion.      Right shoulder: Normal. Normal range of motion. Normal strength. Normal pulse.      Left shoulder: Tenderness present. Normal range of motion. Normal strength. Normal pulse.        Arms:       Cervical back: Normal range of motion and neck supple. Tenderness present. No edema or erythema. Spinous process tenderness and muscular tenderness present. Normal range of motion.   Skin:     General: Skin is warm and dry.   Neurological:      General: No focal deficit present.      Mental Status: He is alert and oriented to person, place, and time.   Psychiatric:         Mood and Affect: Mood normal.         Behavior: Behavior normal.                  Procedures:  Procedures      Medical Decision Making:      Comorbidities that affect care:    Diabetes    External Notes reviewed:    Previous Clinic Note: Office visit with diabetes group on October 6, 2023 for uncontrolled type 1 diabetes      The following orders were placed and all results were independently analyzed by me:  Orders Placed This Encounter   Procedures    XR Spine Cervical Complete 4 or 5 View    Ambulatory Referral to Physical Therapy Evaluate and treat    PT Consult: Eval & Treat Functional Mobility Below Baseline    PT Plan of Care Cert / Re-Cert    POC Glucose STAT    POC Glucose Once       Medications Given in the Emergency Department:  Medications   ketorolac (TORADOL) injection 30 mg (30 mg Intramuscular Given 10/13/23 1425)   cyclobenzaprine (FLEXERIL) tablet 10 mg (10 mg Oral Given 10/13/23 1425)        ED Course:    The patient was initially  evaluated in the triage area where orders were placed. The patient was later dispositioned by Janet Chen PA-C.      The patient was advised to stay for completion of workup which includes but is not limited to communication of labs and radiological results, reassessment and plan. The patient was advised that leaving prior to disposition by a provider could result in critical findings that are not communicated to the patient.     ED Course as of 10/13/23 1520   Fri Oct 13, 2023   1518 X-ray shows cervical degenerative disease negative fractures or dislocations.   [AJ]      ED Course User Index  [AJ] Janet Chen PA-C       Labs:    Lab Results (last 24 hours)       Procedure Component Value Units Date/Time    POC Glucose Once [[id number]]  (Abnormal) Collected: 10/13/23 1425    Specimen: Blood Updated: 10/13/23 1427     Glucose 265 mg/dL      Comment: Serial Number: [id number]:  [id number]                Imaging:    XR Spine Cervical Complete 4 or 5 View    Result Date: 10/13/2023  PROCEDURE: XR SPINE CERVICAL COMPLETE 4 OR 5 VW  COMPARISON: None  INDICATIONS: Cervical radiculopathy TO LEFT ARM X 6 DAYS; NO INJURY  FINDINGS:  Normal alignment and curvature.  Mild degenerative disc disease at C4-C5 and C6-C7.  Bony foraminal stenosis on the right at C2-C3 and C4-C5 due to uncovertebral hypertrophy.  Mild uncovertebral hypertrophy present at C5-C6 and C6-C7 on the right without significant stenosis.  No significant bony foraminal stenosis on the left       Cervical degenerative disease     KEYSHAWN PALM MD       Electronically Signed and Approved By: KEYSHAWN PALM MD on 10/13/2023 at 15:13                Differential Diagnosis and Discussion:      Extremity Pain: Differential diagnosis includes but is not limited to soft tissue sprain, tendonitis, tendon injury, dislocation, fracture, deep vein thrombosis, arterial insufficiency, osteoarthritis, bursitis, and ligamentous damage.  Neck Pain: The  patient presents with neck pain. My differential diagnosis includes but is not limited to acute spinal epidural abscess, acute spinal epidural bleed, meningitis, musculoskeletal neck pain, spinal fracture, and osteoarthritis.     All X-rays impressions were independently interpreted by me.    MDM     Amount and/or Complexity of Data Reviewed  Clinical lab tests: reviewed             Patient Care Considerations:    LABS: I considered ordering labs, however denies systemic symptoms      Consultants/Shared Management Plan:    None    Social Determinants of Health:    Patient is independent, reliable, and has access to care.       Disposition and Care Coordination:    Discharged: The patient is suitable and stable for discharge with no need for consideration of observation or admission.    I have explained the patient's condition, diagnoses and treatment plan based on the information available to me at this time. I have answered questions and addressed any concerns. The patient has a good  understanding of the patient's diagnosis, condition, and treatment plan as can be expected at this point. The vital signs have been stable. The patient's condition is stable and appropriate for discharge from the emergency department.      The patient will pursue further outpatient evaluation with the primary care physician or other designated or consulting physician as outlined in the discharge instructions. They are agreeable to this plan of care and follow-up instructions have been explained in detail. The patient has received these instructions in written format and have expressed an understanding of the discharge instructions. The patient is aware that any significant change in condition or worsening of symptoms should prompt an immediate return to this or the closest emergency department or call to 911.  I have explained discharge medications and the need for follow up with the patient/caretakers. This was also printed in the  discharge instructions. Patient was discharged with the following medications and follow up:      Medication List        New Prescriptions      cyclobenzaprine 10 MG tablet  Commonly known as: FLEXERIL  Take 1 tablet by mouth 3 (Three) Times a Day.     ketorolac 10 MG tablet  Commonly known as: TORADOL  Take 1 tablet by mouth Every 6 (Six) Hours As Needed for Moderate Pain.               Where to Get Your Medications        These medications were sent to [address], [place] - [address] [place] [address] - [phone] [facility] [phone]   [address] [facility] [address]      Phone: [phone]   cyclobenzaprine 10 MG tablet  ketorolac 10 MG tablet      No follow-up provider specified.     Final diagnoses:   Cervical radiculopathy   DDD (degenerative disc disease), cervical        ED Disposition       ED Disposition   Discharge    Condition   Stable    Comment   --               This medical record created using voice recognition software.             Janet Chen PA-C  10/13/23 9914

## 2023-10-13 NOTE — DISCHARGE INSTRUCTIONS
Your x-ray was negative for any fractures or dislocations however it does show that you have degenerative disc disease at C4-C5 and C6-C7  You have some stenosis on the right  I have sent anti-inflammatories along with muscle relaxers  Please follow-up with your PCP for further imaging as needed

## 2023-10-16 DIAGNOSIS — M89.8X1 PAIN OF LEFT SCAPULA: Primary | ICD-10-CM

## 2023-10-16 DIAGNOSIS — M54.12 CERVICAL RADICULOPATHY: ICD-10-CM

## 2023-10-17 DIAGNOSIS — M89.8X1 PAIN OF LEFT SCAPULA: Primary | ICD-10-CM

## 2023-10-26 ENCOUNTER — OFFICE VISIT (OUTPATIENT)
Dept: PODIATRY | Facility: CLINIC | Age: 43
End: 2023-10-26
Payer: COMMERCIAL

## 2023-10-26 VITALS
HEART RATE: 76 BPM | BODY MASS INDEX: 27.43 KG/M2 | OXYGEN SATURATION: 96 % | WEIGHT: 207 LBS | DIASTOLIC BLOOD PRESSURE: 72 MMHG | TEMPERATURE: 97.1 F | SYSTOLIC BLOOD PRESSURE: 106 MMHG | HEIGHT: 73 IN

## 2023-10-26 DIAGNOSIS — E11.8 DIABETIC FOOT: ICD-10-CM

## 2023-10-26 DIAGNOSIS — Z79.4 TYPE 2 DIABETES MELLITUS WITHOUT COMPLICATION, WITH LONG-TERM CURRENT USE OF INSULIN: Primary | ICD-10-CM

## 2023-10-26 DIAGNOSIS — E11.9 TYPE 2 DIABETES MELLITUS WITHOUT COMPLICATION, WITH LONG-TERM CURRENT USE OF INSULIN: Primary | ICD-10-CM

## 2023-10-26 NOTE — PROGRESS NOTES
[facility] - PODIATRY    Today's Date: 10/26/23    Patient Name: George Hunter  MRN: [id number]  CSN: [id number]  PCP: Jak Colindres APRN, Last PCP Visit: 2 October 2023  Referring Provider: No ref. provider found    SUBJECTIVE     Chief Complaint   Patient presents with    Left Foot - Follow-up, Diabetes    Right Foot - Follow-up, Diabetes     HPI: George Hunter, a 43 y.o.male, presents to clinic for a diabetic foot evaluation.    New, Established, New Problem: Established    Duration:  2000    Onset:  insidious    Nature:  IDDM    Stable, worsening, improving:  stable    Patient controlling diabetes via:  insulin    Patient denies any fevers, chills, nausea, vomiting, shortness of breath, nor any other constitutional signs nor symptoms.    Medical changes:  Currently having low back issues    Patient states their most recent blood glucose reading was 104.    Past Medical History:   Diagnosis Date    Allergic     Allergies     Anxiety     Back injury     Broken bones     Deafness     Diabetes     Diabetes mellitus type I     Foot pain, bilateral 2018    Fracture     Hammertoe     Head injury     Hernia cerebri     Hyperlipidemia     Hypothyroidism     Neuropathy in diabetes 2009    Plantar fascial fibromatosis of both feet 2018    Toe pain, right      Past Surgical History:   Procedure Laterality Date    HERNIA REPAIR      TOENAIL EXCISION  2021     Family History   Family history unknown: Yes     Social History     Socioeconomic History    Marital status:     Number of children: 1   Tobacco Use    Smoking status: Former     Packs/day: 0.50     Years: 10.00     Additional pack years: 0.00     Total pack years: 5.00     Types: Cigarettes, Cigars    Smokeless tobacco: Never   Vaping Use    Vaping Use: Never used   Substance and Sexual Activity    Alcohol use: Not Currently    Drug use: Never    Sexual activity: Yes     Partners: Female     Allergies   Allergen Reactions    Penicillins  Anaphylaxis     Childhood allergy      Codeine Itching     Current Outpatient Medications   Medication Sig Dispense Refill    cetirizine-pseudoephedrine (ZyrTEC-D) 5-120 MG per 12 hr tablet Take 1 tablet by mouth 2 (Two) Times a Day. 24 tablet 0    Continuous Blood Gluc Sensor (FreeStyle Nadine 3 Sensor) misc USE 1 SENSOR EVERY 14 DAYS 2 each 5    Euthyrox 25 MCG tablet Take 1 tablet by mouth Daily.      Insulin Glargine (BASAGLAR KWIKPEN) 100 UNIT/ML injection pen INJECT 40 UNITS SUBCUTANEOUSLY ONCE DAILY 15 mL 2    ketorolac (TORADOL) 10 MG tablet Take 1 tablet by mouth Every 6 (Six) Hours As Needed for Moderate Pain. 15 tablet 0    lisinopril (Zestril) 2.5 MG tablet Take 1 tablet by mouth Daily. 30 tablet 3    NovoLOG FlexPen 100 UNIT/ML solution pen-injector sc pen INJECT 30 UNITS SUBCUTANEOUSLY THREE TIMES DAILY AS DIRECTED WITH MEALS 15 mL 1    omeprazole (priLOSEC) 40 MG capsule TAKE 1 CAPSULE BY MOUTH ONCE DAILY BEFORE A MEAL 90 capsule 0    simvastatin (ZOCOR) 10 MG tablet Take 1 tablet by mouth Every Night. 30 tablet 3    azithromycin (Zithromax Z-Chas) 250 MG tablet Take 2 tablets by mouth on day 1, then 1 tablet daily on days 2-5 (Patient not taking: Reported on 10/26/2023) 6 tablet 0    cyclobenzaprine (FLEXERIL) 10 MG tablet Take 1 tablet by mouth 3 (Three) Times a Day. (Patient not taking: Reported on 10/26/2023) 20 tablet 0     No current facility-administered medications for this visit.     Review of Systems   Constitutional: Negative.    All other systems reviewed and are negative.      OBJECTIVE     Vitals:    10/26/23 0729   BP: 106/72   Pulse: 76   Temp: 97.1 °F (36.2 °C)   SpO2: 96%       Body mass index is 27.31 kg/m².    Lab Results   Component Value Date    HGBA1C 9.4 (A) 10/06/2023       Lab Results   Component Value Date    GLUCOSE 119 (H) 07/11/2023    CALCIUM 8.8 07/11/2023     07/11/2023    K 3.7 07/11/2023    CO2 23.1 07/11/2023     07/11/2023    BUN 12 07/11/2023     CREATININE 0.74 (L) 07/11/2023    EGFRIFNONA 94 02/01/2022    BCR 16.2 07/11/2023    ANIONGAP 9.9 07/11/2023       Patient seen in no apparent distress.      PHYSICAL EXAM:     Foot/Ankle Exam    GENERAL  Diabetic foot exam performed    Appearance:  appears stated age  Orientation:  AAOx3  Affect:  appropriate  Gait:  unimpaired  Assistance:  independent  Right shoe gear: casual shoe  Left shoe gear: casual shoe    VASCULAR     Right Foot Vascularity   Normal vascular exam    Dorsalis pedis:  2+  Posterior tibial:  2+  Skin temperature:  warm  Edema grading:  None  CFT:  < 3 seconds  Pedal hair growth:  Present  Varicosities:  none     Left Foot Vascularity   Normal vascular exam    Dorsalis pedis:  2+  Posterior tibial:  2+  Skin temperature:  warm  Edema grading:  None  CFT:  < 3 seconds  Pedal hair growth:  Present  Varicosities:  none     NEUROLOGIC     Right Foot Neurologic   Normal sensation    Light touch sensation: normal  Vibratory sensation: normal  Hot/Cold sensation: normal  Protective Sensation using [place] Monofilament:   Sites intact: 10  Sites tested: 10     Left Foot Neurologic   Normal sensation    Light touch sensation: normal  Vibratory sensation: normal  Hot/Cold sensation:  normal  Protective Sensation using [place] Monofilament:   Sites intact: 10  Sites tested: 10    MUSCULOSKELETAL     Right Foot Musculoskeletal   Arch:  Pes cavus     Left Foot Musculoskeletal   Arch:  Pes cavus    MUSCLE STRENGTH     Right Foot Muscle Strength   Foot dorsiflexion:  4  Foot plantar flexion:  4  Foot inversion:  4  Foot eversion:  4     Left Foot Muscle Strength   Foot dorsiflexion:  4  Foot plantar flexion:  4  Foot inversion:  4  Foot eversion:  4    RANGE OF MOTION     Right Foot Range of Motion   Foot and ankle ROM within normal limits       Left Foot Range of Motion   Foot and ankle ROM within normal limits      DERMATOLOGIC      Right Foot Dermatologic   Skin  Right foot skin is  intact.      Left Foot Dermatologic   Skin  Left foot skin is intact.     Diabetic Foot Exam Performed and Monofilament Test Performed       ASSESSMENT/PLAN     Diagnoses and all orders for this visit:    1. Type 2 diabetes mellitus without complication, with long-term current use of insulin (Primary)    2. Diabetic foot      Comprehensive lower extremity examination and evaluation was performed.    Discussed findings and treatment plan including risks, benefits, and treatment options with patient in detail. Patient agreed with treatment plan.    Diabetic foot exam performed and documented this date, compliant with CQM required standards. Detail of findings as noted in physical exam.  Lower extremity Neurologic exam for diabetic patient performed and documented this date, compliant with PQRS required standards. Detail of findings as noted in physical exam.  Advised patient importance of good routine lower extremity hygiene. Advised patient importance of evaluating for intact skin and pain free nail borders.  Advised patient to use mirror to evaluate plantar/ soles of feet for better visualization. Advised patient monitor and phone office to be seen if any cracking to skin, open lesions, painful nail borders or if nails become elongated prior to next visit. Advised patient importance of daily cleansing of lower extremities, followed by good skin cream to maintain normal hydration of skin. Also advised patient importance of close daily monitoring of blood sugar. Advised to regulate diet and medications to maintain control of blood sugar in optimal range. Contact primary care provider if difficulties maintaining blood sugar levels.  Advised Patient of presence of Diabetes Mellitus condition.  Advised Patient risk of progression and worsening or improvement, then return of condition.  Will monitor condition for any change in future. Treat with most appropriate treatment pending status of condition.  Counseled and  advised patient extensively on nature and ramifications of diabetes. Standard instructions given to patient for good diabetic foot care and maintenance. Advised importance of careful monitoring to avoid break down and complications secondary to diabetes. Advised patient importance of strict maintenance of blood sugar control. Advised patient of possible ominous results from neglect of condition, i.e.: amputation/ loss of digits, feet and legs, or even death.  Patient states understands counseling, will monitor closely, continue good hygiene and routine diabetic foot care. Patient will contact office is questions or problems.      An After Visit Summary was printed and given to the patient at discharge, including (if requested) any available informative/educational handouts regarding diagnosis, treatment, or medications. All questions were answered to patient/family satisfaction. Should symptoms fail to improve or worsen they agree to call or return to clinic or to go to the Emergency Department. Discussed the importance of following up with any needed screening tests/labs/specialist appointments and any requested follow-up recommended by me today. Importance of maintaining follow-up discussed and patient accepts that missed appointments can delay diagnosis and potentially lead to worsening of conditions.    Return in about 1 year (around 10/26/2024) for Podiatry Diabetic Foot Exam., or sooner if acute issues arise.    I have dictated this note utilizing Dragon Dictation.  Please note that portions of this note were completed with a voice recognition program.  Part of this note may be an electronic transcription/translation of spoken language to printed text using the Dragon Dictation System.          This document has been electronically signed by Jayy Buitrago DPM on October 26, 2023 07:42 EDT

## 2023-10-27 ENCOUNTER — OFFICE VISIT (OUTPATIENT)
Dept: FAMILY MEDICINE CLINIC | Facility: CLINIC | Age: 43
End: 2023-10-27
Payer: COMMERCIAL

## 2023-10-27 VITALS
HEART RATE: 85 BPM | SYSTOLIC BLOOD PRESSURE: 118 MMHG | WEIGHT: 207 LBS | HEIGHT: 73 IN | OXYGEN SATURATION: 98 % | DIASTOLIC BLOOD PRESSURE: 76 MMHG | TEMPERATURE: 96.6 F | BODY MASS INDEX: 27.43 KG/M2

## 2023-10-27 DIAGNOSIS — Z23 NEED FOR INFLUENZA VACCINATION: Primary | ICD-10-CM

## 2023-10-27 DIAGNOSIS — E10.65 TYPE 1 DIABETES MELLITUS WITH HYPERGLYCEMIA: ICD-10-CM

## 2023-10-27 DIAGNOSIS — M54.12 CERVICAL RADICULOPATHY: ICD-10-CM

## 2023-10-27 DIAGNOSIS — E03.9 HYPOTHYROIDISM, UNSPECIFIED TYPE: ICD-10-CM

## 2023-10-27 RX ORDER — DICLOFENAC SODIUM 75 MG/1
75 TABLET, DELAYED RELEASE ORAL 2 TIMES DAILY
Qty: 180 TABLET | Refills: 0 | Status: SHIPPED | OUTPATIENT
Start: 2023-10-27

## 2023-10-27 RX ORDER — CYCLOBENZAPRINE HCL 10 MG
10 TABLET ORAL 2 TIMES DAILY PRN
Qty: 60 TABLET | Refills: 0 | Status: SHIPPED | OUTPATIENT
Start: 2023-10-27

## 2023-10-27 NOTE — PROGRESS NOTES
"Chief Complaint  Pain (Discuss MRI and possible pain medication )    Subjective         George Hunter presents to [facility]  Patient presents to the office today for continued neck pain that radiates to his left thoracic back.  He states that he has been dealing with this for over 5 years.  He states that he has been to physical therapy twice and has seen a chiropractor as well.  He states that the muscle relaxers and anti-inflammatories do seem to help with the pain.  He is requesting a referral to Dr. Ruiz's office.  I did explain with obtain a MRI first.    Pain         Objective     Vitals:    10/27/23 0904   BP: 118/76   BP Location: Right arm   Patient Position: Sitting   Cuff Size: Adult   Pulse: 85   Temp: 96.6 °F (35.9 °C)   TempSrc: Temporal   SpO2: 98%   Weight: 93.9 kg (207 lb)   Height: 185.4 cm (73\")      Body mass index is 27.31 kg/m².             Physical Exam  Vitals reviewed.   Musculoskeletal:      Cervical back: Tenderness present.        Back:           Result Review :   The following data was reviewed by: MALCOM Farias on 10/27/2023:      Procedures    Assessment and Plan   Diagnoses and all orders for this visit:    1. Need for influenza vaccination (Primary)  -     Fluzone >6 Months (8950-5305)    2. Type 1 diabetes mellitus with hyperglycemia  -     CBC (No Diff); Future  -     Comprehensive Metabolic Panel; Future  -     Hemoglobin A1c; Future  -     Lipid Panel; Future  -     Microalbumin / Creatinine Urine Ratio - Urine, Clean Catch; Future  -     TSH; Future    3. Hypothyroidism, unspecified type  -     CBC (No Diff); Future  -     Comprehensive Metabolic Panel; Future  -     TSH; Future    4. Cervical radiculopathy  -     MRI Cervical Spine Without Contrast; Future  -     diclofenac (VOLTAREN) 75 MG EC tablet; Take 1 tablet by mouth 2 (Two) Times a Day.  Dispense: 180 tablet; Refill: 0  -     cyclobenzaprine (FLEXERIL) 10 MG tablet; Take 1 " tablet by mouth 2 (Two) Times a Day As Needed for Muscle Spasms.  Dispense: 60 tablet; Refill: 0          Follow Up   Return for Next scheduled follow up.  Patient was given instructions and counseling regarding his condition or for health maintenance advice. Please see specific information pulled into the AVS if appropriate.

## 2023-11-03 ENCOUNTER — TREATMENT (OUTPATIENT)
Dept: PHYSICAL THERAPY | Facility: CLINIC | Age: 43
End: 2023-11-03
Payer: COMMERCIAL

## 2023-11-03 DIAGNOSIS — M43.6 STIFFNESS OF CERVICAL SPINE: ICD-10-CM

## 2023-11-03 DIAGNOSIS — M54.12 RADICULOPATHY, CERVICAL: Primary | ICD-10-CM

## 2023-11-03 PROCEDURE — 97161 PT EVAL LOW COMPLEX 20 MIN: CPT | Performed by: PHYSICAL THERAPIST

## 2023-11-03 PROCEDURE — 97110 THERAPEUTIC EXERCISES: CPT | Performed by: PHYSICAL THERAPIST

## 2023-11-03 NOTE — PROGRESS NOTES
Physical Therapy Initial Evaluation and Plan of Care                    [facility] [address] [place] [address]    Patient: George Hunter   : 1980  Diagnosis/ICD-10 Code:  Radiculopathy, cervical [M54.12]  Referring practitioner: MALCOM Farias  Date of Initial Visit: 11/3/2023  Today's Date: 11/3/2023  Patient seen for 1 sessions           Subjective Questionnaire: NDI:15/50 = 30% Disability        Subjective Evaluation    History of Present Illness  Mechanism of injury: The patient presents to physical therapy with complaints of pain in the left shoulder blade (medially). Occasionally he has pains that shoot up to his neck or numbness into his left arm.  This pain has been present intermittently for about 3 years, but was exacerbated about 3 weeks ago without a SAIDA. His pain is increased with just about any movement, but he noted that it's worst when he tries to move his left arm. He takes an anti-inflammatory and muscle relaxer each day (for the past 2 weeks) and it gives him some relief. The numbness into his left arm goes all the way to fingertips. It is normally the first 3 digits of his left hand.      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         Objective          Static Posture     Head  Forward.    Shoulders  Rounded.    Thoracic Spine  Hyperkyphosis.    Tenderness     Additional Tenderness Details  Tenderness to palpation of right sided cervical paraspinals, rhomboids, upper trap,  and levator.    Neurological Testing     Additional Neurological Details  Sensation to light touch intact and equal bilaterally from C2-T1 dermatomal pattern.    Active Range of Motion    Cervical/Thoracic Spine   Cervical    Flexion: 50 degrees   Extension: 30 degrees with pain  Left lateral flexion: 30 degrees   Right lateral flexion: 28 degrees   Left rotation: 65 degrees   Right rotation: 70 degrees     Strength/Myotome Testing     Left Shoulder     Planes of Motion   Flexion: 4+   Extension: 5   Abduction: 4+   External rotation at 0°: 5   Internal rotation at 0°: 5     Isolated Muscles   Lower trapezius: 3+   Middle trapezius: 3+     Right Shoulder     Planes of Motion   Flexion: 4+   Extension: 5   Abduction: 4+   External rotation at 0°: 5   Internal rotation at 0°: 5     Isolated Muscles   Lower trapezius: 4-   Middle trapezius: 4-     Left Elbow   Flexion: 5    Right Elbow   Flexion: 5      See Exercise, Manual, and Modality Logs for complete treatment.     Assessment & Plan       Assessment  Impairments: abnormal muscle firing, abnormal muscle tone, abnormal or restricted ROM, activity intolerance, impaired physical strength, lacks appropriate home exercise program, pain with function and safety issue   Functional limitations: carrying objects, lifting, pulling, pushing, uncomfortable because of pain and unable to perform repetitive tasks   Assessment details: The patient presents to physical therapy with complaints of lower cervical/upper thoracic spine pain with radicular symptoms into his left upper extremity. He presents with associated cervical stiffness, tenderness to palpation, left shoulder weakness, poor posture, and functional deficits (NDI). He would benefit from skilled physical therapy as described below to address these limitations and allow the patient to return to his prior level of function.   Prognosis: good    Goals  Plan Goals: CERVICAL PROBLEMS    1. The patient has limited ROM.    LTG 1: 12 weeks:  The patient will demonstrate 70° of bilateral cervical spine rotation, 40° of bilateral cervical side bending, 45° of cervical flexion, and 45° of cervical extension in  order to adequately monitor blind spots while driving and improve ability to perform activities of daily living.    STATUS:  New     2. The patient has complaints of pain.   LTG 2: 12 weeks:  The patient will report 1/10 pain in order to more easily tolerate activities of daily living and improve sleep quality.    STATUS:  New   STG 2a: 6 weeks:  The patient will report 3/10 pain.    STATUS:  New    3. The patient reports radicular symptoms in the left upper extremity.   LTG 3: 12 weeks:  The patient will report a decrease in radicular symptoms in the left upper extremity by 75%.    STATUS:  New   STG 3a: 6 weeks:  The patient will report a decrease in radicular symptoms in the left upper extremity by 50%.    STATUS:  New    4. Carrying, Moving, and Handling Objects Functional Limitation     LTG 4: 12 weeks:  The patient will demonstrate 10% limitation by achieving a score of 5/50 on the Neck Disability Index.    STATUS:  New   STG 4a: 6 weeks:  The patient will demonstrate 20% limitation by achieving a score of 10/50 on the Neck Disability Index.      STATUS:  New     Plan  Therapy options: will be seen for skilled therapy services  Planned modality interventions: cryotherapy, electrical stimulation/Russian stimulation, TENS, traction, ultrasound and dry needling  Planned therapy interventions: ADL retraining, soft tissue mobilization, strengthening, stretching, therapeutic activities, joint mobilization, home exercise program, functional ROM exercises, flexibility, body mechanics training, postural training, neuromuscular re-education, manual therapy, motor coordination training, spinal/joint mobilization and IADL retraining  Frequency: 2x week  Duration in weeks: 12  Treatment plan discussed with: patient        Visit Diagnoses:    ICD-10-CM ICD-9-CM   1. Radiculopathy, cervical  M54.12 723.4   2. Stiffness of cervical spine  M43.6 723.5       History # of Personal Factors and/or Comorbidities: LOW  (0)  Examination of Body System(s): # of elements: LOW (1-2)  Clinical Presentation: STABLE   Clinical Decision Making: LOW       Timed:         Manual Therapy:    0     mins  73682;     Therapeutic Exercise:    10     mins  94552;     Neuromuscular Josh:    0    mins  36889;    Therapeutic Activity:     0     mins  33594;     Gait Trainin     mins  16036;     Ultrasound:     0     mins  08648;    Ionto                               0    mins   95301  Self Care                       0     mins   16637  Canalith Repos    0     mins 60016      Un-Timed:  Electrical Stimulation:    0     mins  50642 ( );  Dry Needling     0     mins self-pay  Traction     0     mins 15769  Low Eval     30     Mins  25294  Mod Eval     0     Mins  93106  High Eval                       0     Mins  29772  Re-Eval                           0    mins  78249    Timed Treatment:   10   mins   Total Treatment:     40   mins    PT SIGNATURE: Barry Thomas PT    Electronically signed 11/3/2023    [place] License: PT - [id number]      Initial Certification  Certification Period: 11/3/2023 thru 2024  I certify that the therapy services are furnished while this patient is under my care.  The services outlined above are required by this patient, and will be reviewed every 90 days.     PHYSICIAN: Jak Colindres APRN  NPI: [id number]      DATE:     Please sign and return via fax to [phone]. Thank you, [facility].

## 2023-11-09 ENCOUNTER — TREATMENT (OUTPATIENT)
Dept: PHYSICAL THERAPY | Facility: CLINIC | Age: 43
End: 2023-11-09
Payer: COMMERCIAL

## 2023-11-09 DIAGNOSIS — M54.12 RADICULOPATHY, CERVICAL: Primary | ICD-10-CM

## 2023-11-09 DIAGNOSIS — M43.6 STIFFNESS OF CERVICAL SPINE: ICD-10-CM

## 2023-11-09 RX ORDER — OMEPRAZOLE 40 MG/1
CAPSULE, DELAYED RELEASE ORAL
Qty: 90 CAPSULE | Refills: 0 | Status: SHIPPED | OUTPATIENT
Start: 2023-11-09

## 2023-11-09 NOTE — PROGRESS NOTES
Outpatient Physical Therapy                   Physical Therapy Daily Treatment Note    Patient: George Hunter   : 1980  Diagnosis/ICD-10 Code:  Radiculopathy, cervical [M54.12]  Referring practitioner: MALCOM Farias  Date of Initial Visit: Type: THERAPY  Noted: 11/3/2023  Today's Date: 2023  Patient seen for 2 sessions             Subjective   George Hunter reports: he has been doing his HEP exercises but occasionally he has increased pain and is unable to continue the exercises.     Pain: 6/10 pain, at time of arrival located medial to the L scapula.     Objective   Patient tolerated manual therapy well but still notes tightness in his L upper trap and harmony major at the end of the session. Patient had good response to MFR techniques but the muscles were resistant to STM and TPR techniques.     See Exercise, Manual, and Modality Logs for complete treatment.     Assessment/Plan  George is just beginning care to attend to deficits outlined in evaluation, requiring further therapist directed strengthening. Assess how patient tolerated treatment next session.    Progress per Plan of Care      Timed:  Manual Therapy:    12     mins  56177;  Therapeutic Exercise:    15     mins  40572;     Neuromuscular Josh:    0    mins  07675;    Therapeutic Activity:     10     mins  96739;     Gait Trainin     mins  76423;    Aquatic Therapy:     0     mins  93346;       Untimed:  Electrical Stimulation:    0     mins  89287 ( );  Mechanical Traction:    0     mins  03899;       Timed Treatment:   37   mins   Total Treatment:     37   mins      Electronically signed:   Maddie Beavers PTA  Physical Therapist Assistant  [place] License #: [id number]

## 2023-11-13 RX ORDER — INSULIN GLARGINE 100 [IU]/ML
22 INJECTION, SOLUTION SUBCUTANEOUS 2 TIMES DAILY
Qty: 15 ML | Refills: 1 | Status: SHIPPED | OUTPATIENT
Start: 2023-11-13

## 2023-11-16 ENCOUNTER — TREATMENT (OUTPATIENT)
Dept: PHYSICAL THERAPY | Facility: CLINIC | Age: 43
End: 2023-11-16
Payer: COMMERCIAL

## 2023-11-16 DIAGNOSIS — M43.6 STIFFNESS OF CERVICAL SPINE: ICD-10-CM

## 2023-11-16 DIAGNOSIS — M54.12 RADICULOPATHY, CERVICAL: Primary | ICD-10-CM

## 2023-11-16 PROCEDURE — 97530 THERAPEUTIC ACTIVITIES: CPT | Performed by: PHYSICAL THERAPIST

## 2023-11-16 PROCEDURE — 97110 THERAPEUTIC EXERCISES: CPT | Performed by: PHYSICAL THERAPIST

## 2023-11-16 NOTE — PROGRESS NOTES
Outpatient Physical Therapy                   Physical Therapy Daily Treatment Note    Patient: George Hunter   : 1980  Diagnosis/ICD-10 Code:  Radiculopathy, cervical [M54.12]  Referring practitioner: MALCOM Farias  Date of Initial Visit: Type: THERAPY  Noted: 11/3/2023  Today's Date: 2023  Patient seen for 3 sessions             Subjective   George Hunter reports: yesterday he noticed an uncomfortable pulling sensation wrapping around his ribs under his arm and connecting to his painful spot near his left scapula. Patient states he can still feel it a little.     Pain: 4/10 pain, at time of arrival.     Objective     See Exercise, Manual, and Modality Logs for complete treatment.     Assessment/Plan  Patient was educated on posture and preformed stretches to help improve his posture. Pt would benefit from skilled PT to address Range of Motion  and Strength deficits, pain management and any concerns with ADLs.      Progress per Plan of Care      Timed:  Manual Therapy:    0     mins  29213;  Therapeutic Exercise:    20     mins  10612;     Neuromuscular Josh:    0    mins  97493;    Therapeutic Activity:     8     mins  27748;     Gait Trainin     mins  65084;    Aquatic Therapy:     0     mins  99095;       Untimed:  Electrical Stimulation:    0     mins  30048 ( );  Mechanical Traction:    0     mins  71083;       Timed Treatment:   28   mins   Total Treatment:     28   mins      Electronically signed:   Maddie Beavers PTA  Physical Therapist Assistant  [place]RE[place] License #: [id number]

## 2023-12-01 ENCOUNTER — TREATMENT (OUTPATIENT)
Dept: PHYSICAL THERAPY | Facility: CLINIC | Age: 43
End: 2023-12-01
Payer: COMMERCIAL

## 2023-12-01 DIAGNOSIS — M43.6 STIFFNESS OF CERVICAL SPINE: ICD-10-CM

## 2023-12-01 DIAGNOSIS — M54.12 RADICULOPATHY, CERVICAL: Primary | ICD-10-CM

## 2023-12-01 NOTE — PROGRESS NOTES
Progress Note  [facility] [address] [place] [address]      Patient: George Hunter   : 1980  Diagnosis/ICD-10 Code:  Radiculopathy, cervical [M54.12]  Referring practitioner: MALCOM Farias  Date of Initial Visit: Type: THERAPY  Noted: 11/3/2023  Today's Date: 2023  Patient seen for 4 sessions      Subjective:   Subjective Questionnaire: NDI: = 24% Disability   Clinical Progress: improved  Home Program Compliance: Yes  Treatment has included: therapeutic exercise, manual therapy, and therapeutic activity    Subjective   The patient reported that his pain level today is a 3/10. His pain is increased first thing in the morning and if he sits too long at his desk. Since starting PT, he doesn't know if the pain has improved much. He is agreeable to try dry needling today. He was educated on the risks/benefits prior to the procedure and provided written and verbal consent prior to treatment.    Objective          Static Posture     Head  Forward.    Shoulders  Rounded.    Thoracic Spine  Hyperkyphosis.    Tenderness     Additional Tenderness Details  Tenderness to palpation of right sided cervical paraspinals, rhomboids, upper trap,  and levator.    Neurological Testing     Additional Neurological Details  Sensation to light touch intact and equal bilaterally from C2-T1 dermatomal pattern.    Active Range of Motion   Cervical/Thoracic Spine   Cervical    Flexion: 50 degrees   Extension: 30 degrees with pain  Left lateral flexion: 30 degrees   Right lateral flexion: 28 degrees   Left rotation: 70 degrees   Right rotation: 70 degrees     Strength/Myotome Testing     Left Shoulder     Planes of Motion   Flexion: 4+   Extension: 5   Abduction: 4+   External rotation at 0°: 5   Internal rotation at 0°: 5     Isolated Muscles   Lower trapezius: 4-   Middle trapezius: 4-     Right Shoulder     Planes of Motion   Flexion: 4+   Extension: 5   Abduction: 4+   External rotation at 0°: 5    Internal rotation at 0°: 5     Isolated Muscles   Lower trapezius: 4   Middle trapezius: 4     Left Elbow   Flexion: 5    Right Elbow   Flexion: 5      See Exercise, Manual, and Modality Logs for complete treatment.     Assessment & Plan       Assessment  Assessment details: The patient was re-evaluated today and presents with some improvements in pain, cervical ROM, upper extremity strength, and self-rated function (NDI). Although improved, he continues to present with persistent intermittent pain in the left upper/mid thoracic region of his spine. He would benefit from continued skilled physical therapy to address remaining functional deficits and to allow the patient to return to his prior level of function.     The patient was educated on the risks/benefits of dry needling prior to undergoing treatment. He tolerated the treatment well with no immediate adverse effects. Treatment was successful in eliciting several local twitch responses in thoracic multifidi.     Goals  Plan Goals: CERVICAL PROBLEMS     1. The patient has limited ROM.                       LTG 1: 12 weeks:  The patient will demonstrate 70° of bilateral cervical spine rotation, 40° of bilateral cervical side bending, 45° of cervical flexion, and 45° of cervical extension in order to adequately monitor blind spots while driving and improve ability to perform activities of daily living.                          STATUS:  Progressing                2. The patient has complaints of pain.              LTG 2: 12 weeks:  The patient will report 1/10 pain in order to more easily tolerate activities of daily living and improve sleep quality.                          STATUS:  Progressing              STG 2a: 6 weeks:  The patient will report 3/10 pain.                          STATUS:  Met     3. The patient reports radicular symptoms in the left upper extremity.              LTG 3: 12 weeks:  The patient will report a decrease in radicular symptoms in  the left upper extremity by 75%.                          STATUS:  Progressing              STG 3a: 6 weeks:  The patient will report a decrease in radicular symptoms in the left upper extremity by 50%.                          STATUS:  Progressing     4. Carrying, Moving, and Handling Objects Functional Limitation                               LTG 4: 12 weeks:  The patient will demonstrate 10% limitation by achieving a score of 5/50 on the Neck Disability Index.                          STATUS:  Progressing              STG 4a: 6 weeks:  The patient will demonstrate 20% limitation by achieving a score of 10/50 on the Neck Disability Index.                            STATUS:  Progressing        Progress toward previous goals: Partially Met      Recommendations: Continue as planned  Timeframe: 1 month  Prognosis to achieve goals: good    PT Signature: Barry Thomas PT    Electronically signed 2023    [place] License: PT - [id number]       Timed:  Manual Therapy:    8     mins  73219;  Therapeutic Exercise:    4     mins  88159;     Neuromuscular Josh:    0    mins  56392;    Therapeutic Activity:     12     mins  52901;     Gait Trainin     mins  57299;     Aquatics                         0      mins  02763    Un-timed:  Mechanical Traction      0     mins  53246  Dry Needling     5     mins self-pay (Trial)  Electrical Stimulation:    0     mins  00398 ( );    Timed Treatment:   24   mins   Total Treatment:     29   mins

## 2023-12-06 ENCOUNTER — TREATMENT (OUTPATIENT)
Dept: PHYSICAL THERAPY | Facility: CLINIC | Age: 43
End: 2023-12-06
Payer: COMMERCIAL

## 2023-12-06 DIAGNOSIS — M43.6 STIFFNESS OF CERVICAL SPINE: ICD-10-CM

## 2023-12-06 DIAGNOSIS — M54.12 RADICULOPATHY, CERVICAL: Primary | ICD-10-CM

## 2023-12-06 NOTE — PROGRESS NOTES
Physical Therapy Daily Treatment Note                      [facility] [address] [facility] [place] [address]    Patient: George Hunter   : 1980  Diagnosis/ICD-10 Code:  Radiculopathy, cervical [M54.12]  Referring practitioner: MALCOM Farias  Date of Initial Visit: Type: THERAPY  Noted: 11/3/2023  Today's Date: 2023  Patient seen for 5 sessions           Subjective   The patient reported that his neck feels stiff today. He hasn't had muscle relaxers for 2 days due to running out. He has his cervical MRI on Friday and will follow up with his MD afterwards to discuss further plan of care.    Objective   See Exercise, Manual, and Modality Logs for complete treatment.     Assessment/Plan  The patient demonstrated good tolerance to all interventions today. He continues to present with cervical stiffness and left sided radicular symptoms. He has an MRI on Friday and will let us know afterwards if he plans to follow up with PT.       Timed:  Manual Therapy:    8     mins  51148;  Therapeutic Exercise:    10     mins  69591;     Neuromuscular Josh:   0    mins  11737;    Therapeutic Activity:     10     mins  77541;     Gait Trainin     mins  74405;     Aquatics                         0      mins  62119    Un-timed:  Mechanical Traction      0     mins  42867  Dry Needling     0     mins self-pay  Electrical Stimulation:    0     mins  40614 ( );      Timed Treatment:   28   mins   Total Treatment:     28   mins    Barry Thomas PT  Physical Therapist    Electronically signed 2023    [place] License: PT - [id number]

## 2023-12-08 ENCOUNTER — HOSPITAL ENCOUNTER (OUTPATIENT)
Dept: MRI IMAGING | Facility: HOSPITAL | Age: 43
Discharge: HOME | End: 2023-12-08
Admitting: NURSE PRACTITIONER
Payer: COMMERCIAL

## 2023-12-08 DIAGNOSIS — M54.12 CERVICAL RADICULOPATHY: ICD-10-CM

## 2023-12-08 PROCEDURE — 72141 MRI NECK SPINE W/O DYE: CPT

## 2023-12-09 ENCOUNTER — TELEMEDICINE (OUTPATIENT)
Dept: FAMILY MEDICINE CLINIC | Facility: TELEHEALTH | Age: 43
End: 2023-12-09
Payer: COMMERCIAL

## 2023-12-09 DIAGNOSIS — J06.9 UPPER RESPIRATORY TRACT INFECTION, UNSPECIFIED TYPE: ICD-10-CM

## 2023-12-09 DIAGNOSIS — J02.9 PHARYNGITIS, UNSPECIFIED ETIOLOGY: Primary | ICD-10-CM

## 2023-12-09 RX ORDER — DEXTROMETHORPHAN HYDROBROMIDE AND PROMETHAZINE HYDROCHLORIDE 15; 6.25 MG/5ML; MG/5ML
5 SYRUP ORAL 4 TIMES DAILY PRN
Qty: 118 ML | Refills: 0 | Status: SHIPPED | OUTPATIENT
Start: 2023-12-09

## 2023-12-09 RX ORDER — FLUTICASONE PROPIONATE 50 MCG
2 SPRAY, SUSPENSION (ML) NASAL DAILY
Qty: 16 ML | Refills: 0 | Status: SHIPPED | OUTPATIENT
Start: 2023-12-09

## 2023-12-09 RX ORDER — AZITHROMYCIN 250 MG/1
TABLET, FILM COATED ORAL
Qty: 6 TABLET | Refills: 0 | Status: SHIPPED | OUTPATIENT
Start: 2023-12-09

## 2023-12-09 NOTE — LETTER
December 9, 2023     Patient: George Hunter   YOB: 1980   Date of Visit: 12/9/2023       To Whom It May Concern:    It is my medical opinion that George Hunter may return to work on Tuesday 12/12/2023.           Sincerely,    MALCOM Morfin    CC:   No Recipients

## 2023-12-09 NOTE — PROGRESS NOTES
Subjective   Chief Complaint   Patient presents with    Sore Throat           Nasal Congestion       George Hunter is a 43 y.o. male.     History of Present Illness  Pt reports sudden onset of very sore throat, burning throat, congestion and drainage that started about 10 hours ago. Throat feels swollen. He feels like he may have a fever.  His son has been sick with similar respiratory symptoms.  Sore Throat   This is a new problem. The current episode started yesterday. The problem has been unchanged. Maximum temperature: tactile. Associated symptoms include congestion, neck pain, swollen glands and trouble swallowing. Pertinent negatives include no abdominal pain, coughing, diarrhea, drooling, ear discharge, ear pain, headaches, hoarse voice, shortness of breath, stridor or vomiting. He has tried cool liquids for the symptoms.        Allergies   Allergen Reactions    Penicillins Anaphylaxis     Childhood allergy      Codeine Itching       Past Medical History:   Diagnosis Date    Allergic     Allergies     Anxiety     Back injury     Broken bones     Deafness     Diabetes     Diabetes mellitus type I     Foot pain, bilateral 2018    Fracture     Hammertoe     Head injury     Hernia cerebri     Hyperlipidemia     Hypothyroidism     Neuropathy in diabetes 2009    Plantar fascial fibromatosis of both feet 2018    Toe pain, right        Past Surgical History:   Procedure Laterality Date    HERNIA REPAIR      TOENAIL EXCISION  2021       Social History     Socioeconomic History    Marital status:     Number of children: 1   Tobacco Use    Smoking status: Former     Packs/day: 0.50     Years: 10.00     Additional pack years: 0.00     Total pack years: 5.00     Types: Cigarettes, Cigars    Smokeless tobacco: Never   Vaping Use    Vaping Use: Never used   Substance and Sexual Activity    Alcohol use: Not Currently    Drug use: Never    Sexual activity: Yes     Partners: Female       Family History   Family history  unknown: Yes         Current Outpatient Medications:     azithromycin (Zithromax Z-Chas) 250 MG tablet, Take 2 tablets by mouth on day 1, then 1 tablet daily on days 2-5, Disp: 6 tablet, Rfl: 0    cetirizine-pseudoephedrine (ZyrTEC-D) 5-120 MG per 12 hr tablet, Take 1 tablet by mouth 2 (Two) Times a Day., Disp: 24 tablet, Rfl: 0    Continuous Blood Gluc Sensor (FreeStyle Nadine 3 Sensor) misc, USE 1 SENSOR EVERY 14 DAYS, Disp: 2 each, Rfl: 5    cyclobenzaprine (FLEXERIL) 10 MG tablet, Take 1 tablet by mouth 2 (Two) Times a Day As Needed for Muscle Spasms., Disp: 60 tablet, Rfl: 0    diclofenac (VOLTAREN) 75 MG EC tablet, Take 1 tablet by mouth 2 (Two) Times a Day., Disp: 180 tablet, Rfl: 0    Euthyrox 25 MCG tablet, Take 1 tablet by mouth Daily., Disp: , Rfl:     fluticasone (FLONASE) 50 MCG/ACT nasal spray, 2 sprays into the nostril(s) as directed by provider Daily., Disp: 16 mL, Rfl: 0    Insulin Glargine (BASAGLAR KWIKPEN) 100 UNIT/ML injection pen, Inject 22 Units under the skin into the appropriate area as directed 2 (Two) Times a Day., Disp: 15 mL, Rfl: 1    lisinopril (Zestril) 2.5 MG tablet, Take 1 tablet by mouth Daily., Disp: 30 tablet, Rfl: 3    NovoLOG FlexPen 100 UNIT/ML solution pen-injector sc pen, INJECT 30 UNITS SUBCUTANEOUSLY THREE TIMES DAILY AS DIRECTED WITH MEALS, Disp: 15 mL, Rfl: 1    omeprazole (priLOSEC) 40 MG capsule, TAKE 1 CAPSULE BY MOUTH ONCE DAILY BEFORE A MEAL, Disp: 90 capsule, Rfl: 0    promethazine-dextromethorphan (PROMETHAZINE-DM) 6.25-15 MG/5ML syrup, Take 5 mL by mouth 4 (Four) Times a Day As Needed for Cough., Disp: 118 mL, Rfl: 0    simvastatin (ZOCOR) 10 MG tablet, Take 1 tablet by mouth Every Night., Disp: 30 tablet, Rfl: 3      Review of Systems   Constitutional:  Positive for fatigue. Negative for chills and diaphoresis.   HENT:  Positive for congestion, postnasal drip, sore throat, swollen glands and trouble swallowing. Negative for drooling, ear discharge, ear pain,  hoarse voice and rhinorrhea.    Respiratory:  Negative for cough, chest tightness, shortness of breath, wheezing and stridor.    Gastrointestinal:  Positive for nausea. Negative for abdominal pain, diarrhea and vomiting.   Musculoskeletal:  Positive for myalgias and neck pain.   Neurological:  Negative for headache.        There were no vitals filed for this visit.    Objective   Physical Exam  Constitutional:       General: He is not in acute distress.     Appearance: Normal appearance. He is not ill-appearing, toxic-appearing or diaphoretic.   HENT:      Head: Normocephalic.      Nose: Congestion present.      Mouth/Throat:      Lips: Pink.      Mouth: Mucous membranes are moist.      Comments: Patient states he tried to look at his throat but the lighting was not good enough to visualize  Pulmonary:      Effort: Pulmonary effort is normal.   Lymphadenopathy:      Cervical: Cervical adenopathy (per pt) present.   Neurological:      Mental Status: He is alert and oriented to person, place, and time.          Procedures     Assessment & Plan   Diagnoses and all orders for this visit:    1. Pharyngitis, unspecified etiology (Primary)  -     azithromycin (Zithromax Z-Chas) 250 MG tablet; Take 2 tablets by mouth on day 1, then 1 tablet daily on days 2-5  Dispense: 6 tablet; Refill: 0    2. Upper respiratory tract infection, unspecified type  -     promethazine-dextromethorphan (PROMETHAZINE-DM) 6.25-15 MG/5ML syrup; Take 5 mL by mouth 4 (Four) Times a Day As Needed for Cough.  Dispense: 118 mL; Refill: 0  -     fluticasone (FLONASE) 50 MCG/ACT nasal spray; 2 sprays into the nostril(s) as directed by provider Daily.  Dispense: 16 mL; Refill: 0    Continue treating symptoms.  Salt water gargles as needed for throat discomfort.  Alternate tylenol and motrin for pain and/or fever, stay hydrated and rest.     If sore throat continues to be significant and your main complaint despite treatment above for few days you may take  the antibiotics for strep throat.  Change toothbrush after couple days on antibiotics.    If symptoms worsen or do not improve follow up with your PCP or visit your nearest Urgent Care Center or ER.        PLAN: Discussed dosing, side effects, recommended other symptomatic care.  Patient should follow up with primary care provider, Urgent Care or ER if symptoms worsen, fail to resolve or other symptoms need attention. Patient/family agree to the above.         MALCOM Morfin     The use of a video visit has been reviewed with the patient and verbal informed consent has been obtained. Myself and George Hunter participated in this visit. The patient is located at [address] [place] [address] [address] [facility]. I am located in [place], [place]. Mychart and Zoom were utilized.        This visit was performed via Telehealth.  This patient has been instructed to follow-up with their primary care provider if their symptoms worsen or the treatment provided does not resolve their illness.

## 2023-12-09 NOTE — PATIENT INSTRUCTIONS
Continue treating symptoms.  Salt water gargles as needed for throat discomfort.  Alternate tylenol and motrin for pain and/or fever, stay hydrated and rest.     If sore throat continues to be significant and your main complaint despite treatment above for few days you may take the antibiotics for strep throat.  Change toothbrush after couple days on antibiotics.    If symptoms worsen or do not improve follow up with your PCP or visit your nearest Urgent Care Center or ER.

## 2023-12-11 RX ORDER — INSULIN ASPART 100 [IU]/ML
INJECTION, SOLUTION INTRAVENOUS; SUBCUTANEOUS
Qty: 15 ML | Refills: 0 | Status: SHIPPED | OUTPATIENT
Start: 2023-12-11

## 2023-12-20 DIAGNOSIS — M54.12 CERVICAL RADICULOPATHY: ICD-10-CM

## 2023-12-20 RX ORDER — CYCLOBENZAPRINE HCL 10 MG
10 TABLET ORAL 2 TIMES DAILY PRN
Qty: 60 TABLET | Refills: 0 | Status: SHIPPED | OUTPATIENT
Start: 2023-12-20

## 2024-01-08 RX ORDER — INSULIN ASPART 100 [IU]/ML
INJECTION, SOLUTION INTRAVENOUS; SUBCUTANEOUS
Qty: 15 ML | Refills: 0 | Status: SHIPPED | OUTPATIENT
Start: 2024-01-08

## 2024-01-08 RX ORDER — INSULIN GLARGINE 100 [IU]/ML
INJECTION, SOLUTION SUBCUTANEOUS
Qty: 15 ML | Refills: 0 | Status: SHIPPED | OUTPATIENT
Start: 2024-01-08

## 2024-01-09 NOTE — PROGRESS NOTES
Chief Complaint  Diabetes (Follow up, med mgt, A1c eval)    Referred By: MALCOM Farias presents to [facility] for diabetes medication management    History of Present Illness    Visit type:  follow-up  Diabetes type:  Type 1  Current diabetes status/concerns/issues:  No concerns with his diabetes.  He has gained 26 pounds since the last appointment.  He attributes this to less activity and snacking while working from home recently  Other health concerns:  He has been having a lot of back issues; he was also treated with antibiotics for ear infection  Current Diabetes symptoms:    Polyuria: No   Polydipsia: No   Polyphagia: No   Blurred vision: No   Excessive fatigue: Yes he doesn't sleep well  Known Diabetes complications:  Neuropathy: None; Location: N/A  Renal: None  Eyes: None; Location: N/A  Amputation/Wounds: None  GI: None  Cardiovascular: None  ED: None  Other: None  Hypoglycemia:  Level 1 hypoglycemia (54 mg/dL - 70 mg/dL); Frequency - rare events  Hypoglycemia Symptoms:  sweating  Current diabetes treatment:   Basaglar 22 units twice a day and Humalog using a 1-10 carbohydrate ratio to 1-20 correction for glucose levels greater than 120 mg/dL.  He reports averaging around 7 to 10 units with most meals    Blood glucose device:  Meter  Blood glucose monitoring frequency:  2 -3  Blood glucose range/average:   130-150  Glucose Source: Patient Reported  Diet:   he is now snacking less but had difficulties with snacking over the holidays  Activity/Exercise:  None    Past Medical History:   Diagnosis Date    Allergic     Allergies     Anxiety     Back injury     Broken bones     Deafness     Diabetes     Diabetes mellitus type I     Foot pain, bilateral 2018    Fracture     Hammertoe     Head injury     Hernia cerebri     Hyperlipidemia     Hypothyroidism     Neuropathy in diabetes 2009    Plantar fascial fibromatosis of both feet 2018    Toe  pain, right      Past Surgical History:   Procedure Laterality Date    HERNIA REPAIR      TOENAIL EXCISION  2021     Family History   Family history unknown: Yes     Social History     Socioeconomic History    Marital status:     Number of children: 1   Tobacco Use    Smoking status: Former     Packs/day: 0.50     Years: 10.00     Additional pack years: 0.00     Total pack years: 5.00     Types: Cigarettes, Cigars    Smokeless tobacco: Never   Vaping Use    Vaping Use: Never used   Substance and Sexual Activity    Alcohol use: Not Currently    Drug use: Never    Sexual activity: Yes     Partners: Female     Allergies   Allergen Reactions    Penicillins Anaphylaxis     Childhood allergy      Codeine Itching       Current Outpatient Medications:     cetirizine-pseudoephedrine (ZyrTEC-D) 5-120 MG per 12 hr tablet, Take 1 tablet by mouth 2 (Two) Times a Day., Disp: 24 tablet, Rfl: 0    Continuous Blood Gluc Sensor (FreeStyle Nadine 3 Sensor) misc, 1 each by Other route Every 14 (Fourteen) Days., Disp: 2 each, Rfl: 5    cyclobenzaprine (FLEXERIL) 10 MG tablet, Take 1 tablet by mouth 2 (Two) Times a Day As Needed for Muscle Spasms., Disp: 60 tablet, Rfl: 0    diclofenac (VOLTAREN) 75 MG EC tablet, Take 1 tablet by mouth 2 (Two) Times a Day., Disp: 180 tablet, Rfl: 0    Euthyrox 25 MCG tablet, Take 1 tablet by mouth Daily., Disp: , Rfl:     fluticasone (FLONASE) 50 MCG/ACT nasal spray, 2 sprays into the nostril(s) as directed by provider Daily., Disp: 16 mL, Rfl: 0    Insulin Glargine (BASAGLAR KWIKPEN) 100 UNIT/ML injection pen, INJECT 40 UNITS SUBCUTANEOUSLY ONCE DAILY, Disp: 15 mL, Rfl: 0    lisinopril (Zestril) 2.5 MG tablet, Take 1 tablet by mouth Daily., Disp: 30 tablet, Rfl: 3    NovoLOG FlexPen 100 UNIT/ML solution pen-injector sc pen, INJECT 30 UNITS SUBCUTANEOUSLY THREE TIMES DAILY AS DIRECTED WITH MEALS, Disp: 15 mL, Rfl: 0    omeprazole (priLOSEC) 40 MG capsule, TAKE 1 CAPSULE BY MOUTH ONCE DAILY BEFORE A  "MEAL, Disp: 90 capsule, Rfl: 0    simvastatin (ZOCOR) 10 MG tablet, Take 1 tablet by mouth Every Night., Disp: 30 tablet, Rfl: 3    Objective     Vitals:    01/11/24 0734   BP: 133/96   BP Location: Right arm   Patient Position: Sitting   Cuff Size: Adult   Pulse: 104   SpO2: 97%   Weight: 104 kg (230 lb)   Height: 185.4 cm (73\")   PainSc: 0-No pain     Body mass index is 30.34 kg/m².    Physical Exam  Constitutional:       Appearance: Normal appearance. He is obese.      Comments: Obesity (BMI 30 - 39.9) Pt Current BMI = 30.34    HENT:      Head: Normocephalic and atraumatic.      Right Ear: External ear normal.      Left Ear: External ear normal.      Nose: Nose normal.   Eyes:      Extraocular Movements: Extraocular movements intact.      Conjunctiva/sclera: Conjunctivae normal.   Pulmonary:      Effort: Pulmonary effort is normal.   Musculoskeletal:         General: Normal range of motion.      Cervical back: Normal range of motion.   Skin:     General: Skin is warm and dry.   Neurological:      General: No focal deficit present.      Mental Status: He is alert and oriented to person, place, and time. Mental status is at baseline.   Psychiatric:         Mood and Affect: Mood normal.         Behavior: Behavior normal.         Thought Content: Thought content normal.         Judgment: Judgment normal.             Result Review :   The following data was reviewed by: MALCOM Silverman on 01/11/2024:    Most Recent A1C          1/11/2024    07:38   HGBA1C Most Recent   Hemoglobin A1C 7.6        A1C Last 3 Results          10/6/2023    09:03 1/11/2024    07:38   HGBA1C Last 3 Results   Hemoglobin A1C 9.4  7.6      A1c collected in the office today is 7.6%, indicating Uncontrolled Type I diabetes.  This result is down from the prior result of 9.4% collected on 10/6/23     Glucose   Date Value Ref Range Status   01/11/2024 76 70 - 99 mg/dL Final     Comment:     Serial Number: [id number]:  [id number] "     Point of care glucose in the office today is within normal limits for nonfasting glucose            Assessment: He has had improvement in his A1c but deja above target.  Over the holidays the patient had lots of problems with his back and had to go to physical therapy.  He had to work from home and noted that he was snacking much more frequently and far less active than his usual.  This is led to a significant weight gain of 26 pounds since his last appointment.  He is now back to his normal schedule with work.      Diagnoses and all orders for this visit:    1. Uncontrolled type 1 diabetes mellitus with hyperglycemia (Primary)  -     POC Glycosylated Hemoglobin (Hb A1C)  -     Continuous Blood Gluc Sensor (FreeStyle Nadine 3 Sensor) misc; 1 each by Other route Every 14 (Fourteen) Days.  Dispense: 2 each; Refill: 5    2. Obesity (BMI 30-39.9)    Other orders  -     POC Glucose        Plan: We made no changes to his treatment plan today as his A1c has improved.  The patient is strongly encouraged to monitor his dietary intake and his activity to help promote glucose control and weight loss.    The patient will monitor his blood glucose levels 3-4 times each day.  If he develops problematic hyperglycemia or hypoglycemia or adverse drug reactions, he will contact the office for further instructions.        Follow Up     Return in about 3 months (around 4/11/2024) for Medication Management.    Patient was given instructions and counseling regarding his condition or for health maintenance advice. Please see specific information pulled into the AVS if appropriate.     Indu Lloyd, APRN  01/11/2024      Dictated Utilizing Dragon Dictation.  Please note that portions of this note were completed with a voice recognition program.  Part of this note may be an electronic transcription/translation of spoken language to printed text using the Dragon Dictation System.

## 2024-01-11 ENCOUNTER — OFFICE VISIT (OUTPATIENT)
Dept: DIABETES SERVICES | Facility: HOSPITAL | Age: 44
End: 2024-01-11
Payer: COMMERCIAL

## 2024-01-11 VITALS
WEIGHT: 230 LBS | HEIGHT: 73 IN | DIASTOLIC BLOOD PRESSURE: 96 MMHG | HEART RATE: 104 BPM | SYSTOLIC BLOOD PRESSURE: 133 MMHG | OXYGEN SATURATION: 97 % | BODY MASS INDEX: 30.48 KG/M2

## 2024-01-11 DIAGNOSIS — E66.9 OBESITY (BMI 30-39.9): ICD-10-CM

## 2024-01-11 DIAGNOSIS — E10.65 UNCONTROLLED TYPE 1 DIABETES MELLITUS WITH HYPERGLYCEMIA: Primary | ICD-10-CM

## 2024-01-11 LAB
EXPIRATION DATE: ABNORMAL
GLUCOSE BLDC GLUCOMTR-MCNC: 76 MG/DL (ref 70–99)
HBA1C MFR BLD: 7.6 % (ref 4.5–5.7)
Lab: ABNORMAL

## 2024-01-11 PROCEDURE — G0463 HOSPITAL OUTPT CLINIC VISIT: HCPCS | Performed by: NURSE PRACTITIONER

## 2024-01-11 PROCEDURE — 99213 OFFICE O/P EST LOW 20 MIN: CPT | Performed by: NURSE PRACTITIONER

## 2024-01-11 PROCEDURE — 82948 REAGENT STRIP/BLOOD GLUCOSE: CPT | Performed by: NURSE PRACTITIONER

## 2024-01-11 RX ORDER — BLOOD-GLUCOSE SENSOR
1 EACH MISCELLANEOUS
Qty: 2 EACH | Refills: 5 | Status: SHIPPED | OUTPATIENT
Start: 2024-01-11

## 2024-01-17 ENCOUNTER — TELEMEDICINE (OUTPATIENT)
Dept: FAMILY MEDICINE CLINIC | Facility: TELEHEALTH | Age: 44
End: 2024-01-17
Payer: COMMERCIAL

## 2024-01-17 DIAGNOSIS — H92.01 RIGHT EAR PAIN: Primary | ICD-10-CM

## 2024-01-17 DIAGNOSIS — J06.9 UPPER RESPIRATORY TRACT INFECTION, UNSPECIFIED TYPE: ICD-10-CM

## 2024-01-17 RX ORDER — CLINDAMYCIN HYDROCHLORIDE 300 MG/1
300 CAPSULE ORAL 3 TIMES DAILY
Qty: 21 CAPSULE | Refills: 0 | Status: SHIPPED | OUTPATIENT
Start: 2024-01-17 | End: 2024-01-24

## 2024-01-17 RX ORDER — OSELTAMIVIR PHOSPHATE 75 MG/1
75 CAPSULE ORAL 2 TIMES DAILY
Qty: 10 CAPSULE | Refills: 0 | Status: SHIPPED | OUTPATIENT
Start: 2024-01-17 | End: 2024-01-22

## 2024-01-17 NOTE — PROGRESS NOTES
Subjective   Chief Complaint   Patient presents with    Nasal Congestion       George Hunter is a 43 y.o. male.     History of Present Illness  Patient is a 43-year-old male with history of type 1 diabetes, hyper lipidemia, and hypothyroidism who presents to the urgent care with complaint of nasal congestion that began last night.  Associated with fever max 101, sore throat, right ear pain, myalgias, postnasal drainage, and intermittent dry cough from the postnasal drainage.  Patient denies difficulty swallowing, drainage from the ears, shortness of breath, chest pain, and headache.  Patient is concerned as he has a history of recurrent ear infections.  Patient states his last ear infection was several months ago.  Patient denies any known contacts or exposures.  Patient states he did take an at home COVID test that was negative.       Allergies   Allergen Reactions    Penicillins Anaphylaxis     Childhood allergy      Codeine Itching       Past Medical History:   Diagnosis Date    Allergic     Allergies     Anxiety     Back injury     Broken bones     Deafness     Diabetes     Diabetes mellitus type I     Foot pain, bilateral 2018    Fracture     Hammertoe     Head injury     Hernia cerebri     Hyperlipidemia     Hypothyroidism     Neuropathy in diabetes 2009    Plantar fascial fibromatosis of both feet 2018    Toe pain, right        Past Surgical History:   Procedure Laterality Date    HERNIA REPAIR      TOENAIL EXCISION  2021       Social History     Socioeconomic History    Marital status:     Number of children: 1   Tobacco Use    Smoking status: Former     Packs/day: 0.50     Years: 10.00     Additional pack years: 0.00     Total pack years: 5.00     Types: Cigarettes, Cigars    Smokeless tobacco: Never   Vaping Use    Vaping Use: Never used   Substance and Sexual Activity    Alcohol use: Not Currently    Drug use: Never    Sexual activity: Yes     Partners: Female       Family History   Family history  unknown: Yes         Current Outpatient Medications:     cetirizine-pseudoephedrine (ZyrTEC-D) 5-120 MG per 12 hr tablet, Take 1 tablet by mouth 2 (Two) Times a Day., Disp: 24 tablet, Rfl: 0    clindamycin (CLEOCIN) 300 MG capsule, Take 1 capsule by mouth 3 (Three) Times a Day for 7 days., Disp: 21 capsule, Rfl: 0    Continuous Blood Gluc Sensor (FreeStyle Nadine 3 Sensor) misc, 1 each by Other route Every 14 (Fourteen) Days., Disp: 2 each, Rfl: 5    cyclobenzaprine (FLEXERIL) 10 MG tablet, Take 1 tablet by mouth 2 (Two) Times a Day As Needed for Muscle Spasms., Disp: 60 tablet, Rfl: 0    diclofenac (VOLTAREN) 75 MG EC tablet, Take 1 tablet by mouth 2 (Two) Times a Day., Disp: 180 tablet, Rfl: 0    Euthyrox 25 MCG tablet, Take 1 tablet by mouth Daily., Disp: , Rfl:     fluticasone (FLONASE) 50 MCG/ACT nasal spray, 2 sprays into the nostril(s) as directed by provider Daily., Disp: 16 mL, Rfl: 0    Insulin Glargine (BASAGLAR KWIKPEN) 100 UNIT/ML injection pen, INJECT 40 UNITS SUBCUTANEOUSLY ONCE DAILY, Disp: 15 mL, Rfl: 0    lisinopril (Zestril) 2.5 MG tablet, Take 1 tablet by mouth Daily., Disp: 30 tablet, Rfl: 3    NovoLOG FlexPen 100 UNIT/ML solution pen-injector sc pen, INJECT 30 UNITS SUBCUTANEOUSLY THREE TIMES DAILY AS DIRECTED WITH MEALS, Disp: 15 mL, Rfl: 0    omeprazole (priLOSEC) 40 MG capsule, TAKE 1 CAPSULE BY MOUTH ONCE DAILY BEFORE A MEAL, Disp: 90 capsule, Rfl: 0    oseltamivir (Tamiflu) 75 MG capsule, Take 1 capsule by mouth 2 (Two) Times a Day for 5 days., Disp: 10 capsule, Rfl: 0    simvastatin (ZOCOR) 10 MG tablet, Take 1 tablet by mouth Every Night., Disp: 30 tablet, Rfl: 3      Review of Systems   Constitutional:  Positive for fever.   HENT:  Positive for congestion, ear pain, postnasal drip and sore throat. Negative for trouble swallowing and voice change.    Eyes:  Negative for redness.   Respiratory:  Positive for cough. Negative for chest tightness, shortness of breath and wheezing.     Cardiovascular:  Negative for chest pain.   Neurological:  Negative for headache.        There were no vitals filed for this visit.    Objective   Physical Exam  Constitutional:       General: He is not in acute distress.     Appearance: Normal appearance. He is not ill-appearing or toxic-appearing.   HENT:      Head: Normocephalic and atraumatic.      Right Ear: External ear normal.      Left Ear: External ear normal.      Nose: Congestion present.      Mouth/Throat:      Mouth: Mucous membranes are moist.   Eyes:      Conjunctiva/sclera: Conjunctivae normal.   Pulmonary:      Effort: Pulmonary effort is normal.      Comments: Speaking without any signs of respiratory distress.  Musculoskeletal:      Cervical back: Normal range of motion and neck supple.   Skin:     General: Skin is dry.   Neurological:      General: No focal deficit present.      Mental Status: He is alert and oriented to person, place, and time.   Psychiatric:         Mood and Affect: Mood normal.         Behavior: Behavior normal.         Thought Content: Thought content normal.         Judgment: Judgment normal.          Procedures     Assessment & Plan   Diagnoses and all orders for this visit:    1. Right ear pain (Primary)    2. Upper respiratory tract infection, unspecified type    Other orders  -     clindamycin (CLEOCIN) 300 MG capsule; Take 1 capsule by mouth 3 (Three) Times a Day for 7 days.  Dispense: 21 capsule; Refill: 0  -     oseltamivir (Tamiflu) 75 MG capsule; Take 1 capsule by mouth 2 (Two) Times a Day for 5 days.  Dispense: 10 capsule; Refill: 0            MDM:     Advised the following: Encourage fluids.  Take medications as directed.  Advise OTC cough and cold medications.  Follow up with PCP if symptoms do not improve or worsen.  Advise motrin and tylenol for body aches and fever.  Go to the Emergency Department if shortness of air or chest pain develop.    Discussed treatment with Tamiflu as cannot test for flu due to  this being virtual urgent care.  Unable to diagnose with flu without current confirmation of flu test.  However there is concern of flu due to symptoms and will treat due to comorbidities.    Discussed dosing, side effects, recommended other symptomatic care.  Patient should follow up with primary care provider, Urgent Care or ER if symptoms worsen, fail to resolve or other symptoms need attention. Patient/family agree to the above.         Shikha Vyas PA-C     The use of a video visit has been reviewed with the patient and verbal informed consent has been obtained. Myself and George Hunter participated in this visit. The patient is located at [address] [place] [address] [address] [place] [address]. I am located in [place], [place]. Parse and Chatwala were utilized.        This visit was performed via Telehealth.  This patient has been instructed to follow-up with their primary care provider if their symptoms worsen or the treatment provided does not resolve their illness.

## 2024-01-17 NOTE — PATIENT INSTRUCTIONS
Encourage fluids.  Take medications as directed.  Advise OTC cough and cold medications.  Follow up with PCP if symptoms do not improve or worsen.  Advise motrin and tylenol for body aches and fever.  Go to the Emergency Department if shortness of air or chest pain develop.

## 2024-02-05 NOTE — PATIENT INSTRUCTIONS
----- Message from Hussein Edward III sent at 2/3/2024  9:34 AM EST -----  Regarding: Quviviq  Contact: [phone]  Could you let me know which pharmacy this was called into?   Push fluids, rest  Tylenol/ibuprofen for pain or fever>101  Saline nasal spray/irrigation, humidified air for sinus symptoms  Flonase, mucinex with a full glass of water for congestion  Do not suppress your cough unless it prevents you from resting  Follow up if symptoms worsen or do not improve in 1 week.

## 2024-02-19 RX ORDER — INSULIN GLARGINE 100 [IU]/ML
INJECTION, SOLUTION SUBCUTANEOUS
Qty: 15 ML | Refills: 0 | Status: SHIPPED | OUTPATIENT
Start: 2024-02-19

## 2024-03-11 RX ORDER — INSULIN ASPART 100 [IU]/ML
INJECTION, SOLUTION INTRAVENOUS; SUBCUTANEOUS
Qty: 15 ML | Refills: 0 | Status: SHIPPED | OUTPATIENT
Start: 2024-03-11

## 2024-03-12 ENCOUNTER — TELEPHONE (OUTPATIENT)
Dept: DIABETES SERVICES | Facility: HOSPITAL | Age: 44
End: 2024-03-12
Payer: COMMERCIAL

## 2024-03-12 RX ORDER — INSULIN GLARGINE 100 [IU]/ML
INJECTION, SOLUTION SUBCUTANEOUS
Qty: 15 ML | Refills: 0 | Status: SHIPPED | OUTPATIENT
Start: 2024-03-12 | End: 2024-03-15

## 2024-03-12 NOTE — TELEPHONE ENCOUNTER
George Hunter (Key: [id number])  Rx #: [id number]  Basaglar KwikPen 100UNIT/ML pen-injectors          IF DENIED, INS PREFERS        LANTUS SOLOS /ML [id number]  TRESIBA FLEX INJ 100UNIT [id number]  LANTUS /ML [id number]  TRESIBA INJ 100UNIT          SECOND PART OF PAPER PA PLACED IN PROVIDERS BOX TO COMPLETE AND SUBMIT 3-12-24          3-14-24 DENIED, PLEASE CHANGE IF ABLE

## 2024-03-15 ENCOUNTER — PATIENT MESSAGE (OUTPATIENT)
Dept: DIABETES SERVICES | Facility: HOSPITAL | Age: 44
End: 2024-03-15
Payer: COMMERCIAL

## 2024-03-15 RX ORDER — INSULIN GLARGINE 100 [IU]/ML
40 INJECTION, SOLUTION SUBCUTANEOUS DAILY
Qty: 36 ML | Refills: 1 | Status: SHIPPED | OUTPATIENT
Start: 2024-03-15 | End: 2024-09-11

## 2024-04-02 ENCOUNTER — TELEMEDICINE (OUTPATIENT)
Dept: FAMILY MEDICINE CLINIC | Facility: TELEHEALTH | Age: 44
End: 2024-04-02
Payer: COMMERCIAL

## 2024-04-02 DIAGNOSIS — H66.92 LEFT OTITIS MEDIA, UNSPECIFIED OTITIS MEDIA TYPE: Primary | ICD-10-CM

## 2024-04-02 DIAGNOSIS — H66.005 RECURRENT ACUTE SUPPURATIVE OTITIS MEDIA WITHOUT SPONTANEOUS RUPTURE OF LEFT TYMPANIC MEMBRANE: ICD-10-CM

## 2024-04-02 RX ORDER — PSEUDOEPHEDRINE HCL 30 MG
30 TABLET ORAL EVERY 4 HOURS PRN
Qty: 20 TABLET | Refills: 0 | Status: SHIPPED | OUTPATIENT
Start: 2024-04-02 | End: 2024-04-02 | Stop reason: SDUPTHER

## 2024-04-02 RX ORDER — AZITHROMYCIN 500 MG/1
500 TABLET, FILM COATED ORAL DAILY
Qty: 5 TABLET | Refills: 0 | Status: SHIPPED | OUTPATIENT
Start: 2024-04-02 | End: 2024-04-07

## 2024-04-02 RX ORDER — AZITHROMYCIN 500 MG/1
500 TABLET, FILM COATED ORAL DAILY
Qty: 5 TABLET | Refills: 0 | Status: SHIPPED | OUTPATIENT
Start: 2024-04-02 | End: 2024-04-02 | Stop reason: SDUPTHER

## 2024-04-02 RX ORDER — PSEUDOEPHEDRINE HCL 30 MG
30 TABLET ORAL EVERY 4 HOURS PRN
Qty: 20 TABLET | Refills: 0 | Status: SHIPPED | OUTPATIENT
Start: 2024-04-02 | End: 2024-04-07

## 2024-04-02 NOTE — PROGRESS NOTES
You have chosen to receive care through a telehealth visit.  Do you consent to use a video/audio connection for your medical care today? Yes     CHIEF COMPLAINT  Chief Complaint   Patient presents with    Earache         HPI  George Hunter is a 43 y.o. male  presents with complaint of left ear pain and sore throat.  He states that symptoms started last night.  He states that he gets ear infections often and he states that when he does there is a reddish discoloration and that's how he knows.    Review of Systems   HENT:  Positive for ear pain (left).        Past Medical History:   Diagnosis Date    Allergic     Allergies     Anxiety     Back injury     Broken bones     Deafness     Diabetes     Diabetes mellitus type I     Foot pain, bilateral 2018    Fracture     Hammertoe     Head injury     Hernia cerebri     Hyperlipidemia     Hypothyroidism     Neuropathy in diabetes 2009    Plantar fascial fibromatosis of both feet 2018    Toe pain, right        Family History   Family history unknown: Yes       Social History     Socioeconomic History    Marital status:     Number of children: 1   Tobacco Use    Smoking status: Former     Current packs/day: 0.50     Average packs/day: 0.5 packs/day for 10.0 years (5.0 ttl pk-yrs)     Types: Cigarettes, Cigars    Smokeless tobacco: Never   Vaping Use    Vaping status: Never Used   Substance and Sexual Activity    Alcohol use: Not Currently    Drug use: Never    Sexual activity: Yes     Partners: Female       George Hunter  reports that he has quit smoking. His smoking use included cigarettes and cigars. He has a 5 pack-year smoking history. He has never used smokeless tobacco.       There were no vitals taken for this visit.    PHYSICAL EXAM  Physical Exam   Constitutional: He appears well-developed and well-nourished.   HENT:   Head: Normocephalic.   Eyes: Pupils are equal, round, and reactive to light.   Pulmonary/Chest: Effort normal.   Musculoskeletal: Normal range  of motion.   Neurological: He is alert.   Psychiatric: He has a normal mood and affect.       Results for orders placed or performed in visit on 01/11/24   POC Glycosylated Hemoglobin (Hb A1C)    Specimen: Blood   Result Value Ref Range    Hemoglobin A1C 7.6 (A) 4.5 - 5.7 %    Lot Number [id number]     Expiration Date 09.24.25    POC Glucose    Specimen: Blood   Result Value Ref Range    Glucose 76 70 - 99 mg/dL       Diagnoses and all orders for this visit:    1. Left otitis media, unspecified otitis media type (Primary)    2. Recurrent acute suppurative otitis media without spontaneous rupture of left tympanic membrane    Other orders  -     Discontinue: azithromycin (Zithromax) 500 MG tablet; Take 1 tablet by mouth Daily for 5 days.  Dispense: 5 tablet; Refill: 0  -     Discontinue: pseudoephedrine (Sudafed) 30 MG tablet; Take 1 tablet by mouth Every 4 (Four) Hours As Needed for Congestion for up to 5 days.  Dispense: 20 tablet; Refill: 0          FOLLOW-UP  As discussed during visit with PCP/[facility] if no improvement or Urgent Care/Emergency Department if worsening of symptoms    Zithromax and Sudafed sent to [facility] in [facility] due to his pharmacy being closed.  Patient verbalizes understanding of medication dosage, comfort measures, instructions for treatment and follow-up.    MALCOM Vance  04/02/2024  19:35 EDT    The use of a video visit has been reviewed with the patient and verbal informed consent has been obtained. Myself and George Hunter participated in this visit. The patient is located in [address] [place] [address].    I am located in [place], [place]. Mychart and Twilio were utilized. I spent 10 minutes in the patient's chart for this visit.      Note Disclaimer: At [facility], we believe that sharing information builds trust and better   relationships. You are receiving this note because you recently visited [facility]. It is possible you   will see health information  before a provider has talked with you about it. This kind of information can   be easy to misunderstand. To help you fully understand what it means for your health, we urge you to   discuss this note with your provider.

## 2024-04-04 NOTE — PROGRESS NOTES
Chief Complaint  Diabetes (Follow up, med mgt, A1c eval, cgm eval, pt wants to talk about basaglar and it not working )    Referred By: MALCOM Farias presents to [facility] for diabetes medication management    History of Present Illness    Visit type:  follow-up  Diabetes type:  Type 1  Current diabetes status/concerns/issues:  Glucose levels are up and down  Other health concerns:  He had a recent ear infection requiring antibiotics  Current Diabetes symptoms:    Polyuria: No   Polydipsia: No   Polyphagia: No   Blurred vision: No   Excessive fatigue: No  Known Diabetes complications:  Neuropathy: None; Location: N/A  Renal: None  Eyes: None; Location: N/A  Amputation/Wounds: None  GI: None  Cardiovascular: None  ED: None  Other: None  Hypoglycemia:  Level 1 hypoglycemia (54 mg/dL - 70 mg/dL); Frequency - 7% per CGM, Level 2 (less than 54 mg/dL); Frequency - 3% per CGM, and Nocturnal - events are occurring between 12 AM and 6 AM.  He states he did not feel these lows and he typically does  Hypoglycemia Symptoms:  sweating  Current diabetes treatment:  Basaglar 22 units twice a day and Humalog using a 1-10 carbohydrate ratio to 1-20 correction for glucose levels greater than 120 mg/dL.  He reports averaging around 7 to 10 units with most meals    Blood glucose device:  [employer] CGM  Blood glucose monitoring frequency:  Continuous per CGM  Blood glucose range/average:  The 14-day sensor report shows an average glucose of 177 mg/dL, with 50% in target range ( mgdL), 24% in the high range (181-250 mg/dL), 16% in the very high range (>250 mg/dL), 7% in the low range (54-70 mg/dL) and 3% in the very low range (<54 mg/dL).  This is based on 5 days of sporadic data  Glucose Source: Device Reviewed  Diet:  Limits high carb/sweet foods, Avoids sugary drinks  Activity/Exercise:  None    Past Medical History:   Diagnosis Date    Allergic     Allergies      Anxiety     Back injury     Broken bones     Deafness     Diabetes     Diabetes mellitus type I     Foot pain, bilateral 2018    Fracture     Hammertoe     Head injury     Hernia cerebri     Hyperlipidemia     Hypothyroidism     Neuropathy in diabetes 2009    Plantar fascial fibromatosis of both feet 2018    Toe pain, right      Past Surgical History:   Procedure Laterality Date    HERNIA REPAIR      TOENAIL EXCISION  2021     Family History   Family history unknown: Yes     Social History     Socioeconomic History    Marital status:     Number of children: 1   Tobacco Use    Smoking status: Former     Current packs/day: 0.50     Average packs/day: 0.5 packs/day for 10.0 years (5.0 ttl pk-yrs)     Types: Cigarettes, Cigars    Smokeless tobacco: Never   Vaping Use    Vaping status: Never Used   Substance and Sexual Activity    Alcohol use: Not Currently    Drug use: Never    Sexual activity: Yes     Partners: Female     Allergies   Allergen Reactions    Penicillins Anaphylaxis     Childhood allergy      Codeine Itching       Current Outpatient Medications:     cetirizine-pseudoephedrine (ZyrTEC-D) 5-120 MG per 12 hr tablet, Take 1 tablet by mouth 2 (Two) Times a Day., Disp: 24 tablet, Rfl: 0    Continuous Blood Gluc Sensor (FreeStyle Nadine 3 Sensor) misc, 1 each by Other route Every 14 (Fourteen) Days., Disp: 2 each, Rfl: 5    cyclobenzaprine (FLEXERIL) 10 MG tablet, Take 1 tablet by mouth 2 (Two) Times a Day As Needed for Muscle Spasms., Disp: 60 tablet, Rfl: 0    diclofenac (VOLTAREN) 75 MG EC tablet, Take 1 tablet by mouth 2 (Two) Times a Day., Disp: 180 tablet, Rfl: 0    Euthyrox 25 MCG tablet, Take 1 tablet by mouth Daily., Disp: , Rfl:     lisinopril (Zestril) 2.5 MG tablet, Take 1 tablet by mouth Daily., Disp: 30 tablet, Rfl: 3    NovoLOG FlexPen 100 UNIT/ML solution pen-injector sc pen, INJECT 30 UNITS SUBCUTANEOUSLY THREE TIMES DAILY AS DIRECTED WITH MEALS, Disp: 15 mL, Rfl: 0    omeprazole  "(priLOSEC) 40 MG capsule, TAKE 1 CAPSULE BY MOUTH ONCE DAILY BEFORE A MEAL, Disp: 90 capsule, Rfl: 0    simvastatin (ZOCOR) 10 MG tablet, Take 1 tablet by mouth Every Night., Disp: 30 tablet, Rfl: 3    insulin degludec (Tresiba FlexTouch) 100 UNIT/ML solution pen-injector injection, Inject 44 Units under the skin into the appropriate area as directed Daily for 180 days., Disp: 39.6 mL, Rfl: 1    Objective     Vitals:    04/11/24 0734   BP: 125/90   BP Location: Right arm   Patient Position: Sitting   Cuff Size: Adult   Pulse: 97   SpO2: 100%   Weight: 104 kg (229 lb)   Height: 185.4 cm (73\")   PainSc:   5     Body mass index is 30.21 kg/m².    Physical Exam  Constitutional:       Appearance: Normal appearance. He is obese.      Comments: Obesity (BMI 30 - 39.9) Pt Current BMI = 30.21    HENT:      Head: Normocephalic and atraumatic.      Right Ear: External ear normal.      Left Ear: External ear normal.      Nose: Nose normal.   Eyes:      Extraocular Movements: Extraocular movements intact.      Conjunctiva/sclera: Conjunctivae normal.   Pulmonary:      Effort: Pulmonary effort is normal.   Musculoskeletal:         General: Normal range of motion.      Cervical back: Normal range of motion.   Skin:     General: Skin is warm and dry.   Neurological:      General: No focal deficit present.      Mental Status: He is alert and oriented to person, place, and time. Mental status is at baseline.   Psychiatric:         Mood and Affect: Mood normal.         Behavior: Behavior normal.         Thought Content: Thought content normal.         Judgment: Judgment normal.             Result Review :   The following data was reviewed by: MALCOM Silverman on 04/11/2024:    Most Recent A1C          4/11/2024    07:39   HGBA1C Most Recent   Hemoglobin A1C 7.8        A1C Last 3 Results          10/6/2023    09:03 1/11/2024    07:38 4/11/2024    07:39   HGBA1C Last 3 Results   Hemoglobin A1C 9.4  7.6  7.8      A1c collected " in the office today is 7.8%, indicating Uncontrolled Type I diabetes.  This result is up from the prior result of 7.6% collected on 1/11//24     Glucose   Date Value Ref Range Status   04/11/2024 240 (H) 70 - 99 mg/dL Final     Comment:     Serial Number: [id number]:  [id number]     Point of care glucose in the office today is  elevated at 240 mg/dL              Assessment: He has had a slight increase in his A1c.  He is now using a continuous glucose sensor.  Sensor was off for a period of 1 week during this last 14-day report.  The sensor report shows frequent episodes of nocturnal hypoglycemia however the patient states he typically feels his lows and is not experiencing any symptoms when these occur.  He states he does sleep on his side so these may represent false lows due to compression.  He states he has occasional low during the day but they are not causing any symptomatic issues.  He is having postprandial hyperglycemia which is most likely due to missed meal boluses.  He does admit to missing his meal boluses mostly at work.  He also feels like the Basaglar insulin is not as effective for him as the Lantus previously was.      Diagnoses and all orders for this visit:    1. Uncontrolled type 1 diabetes mellitus with hyperglycemia (Primary)  -     POC Glycosylated Hemoglobin (Hb A1C)  -     insulin degludec (Tresiba FlexTouch) 100 UNIT/ML solution pen-injector injection; Inject 44 Units under the skin into the appropriate area as directed Daily for 180 days.  Dispense: 39.6 mL; Refill: 1    2. Uses self-applied continuous glucose monitoring device    Other orders  -     POC Glucose        Plan: We will try switching him to Tresiba insulin taking 44 units once a day.  He is to focus on compliance with using his mealtime insulin before every meal to prevent the postprandial hyperglycemia.  If he has low glucose alerts on his CGM he is requested to do a fingerstick test to validate if these are false or  legitimate hypoglycemia events.    The patient will monitor his blood glucose levels using the CGM.  If he develops problematic hyperglycemia or hypoglycemia or adverse drug reactions, he will contact the office for further instructions.        Follow Up     Return in about 3 months (around 7/11/2024) for Medication Management, CGM Follow-up.    Patient was given instructions and counseling regarding his condition or for health maintenance advice. Please see specific information pulled into the AVS if appropriate.     MALCOM Silverman  04/11/2024      Dictated Utilizing Dragon Dictation.  Please note that portions of this note were completed with a voice recognition program.  Part of this note may be an electronic transcription/translation of spoken language to printed text using the Dragon Dictation System.

## 2024-04-10 RX ORDER — OMEPRAZOLE 40 MG/1
CAPSULE, DELAYED RELEASE ORAL
Qty: 90 CAPSULE | Refills: 0 | Status: SHIPPED | OUTPATIENT
Start: 2024-04-10

## 2024-04-11 ENCOUNTER — OFFICE VISIT (OUTPATIENT)
Dept: DIABETES SERVICES | Facility: HOSPITAL | Age: 44
End: 2024-04-11
Payer: COMMERCIAL

## 2024-04-11 VITALS
WEIGHT: 229 LBS | HEIGHT: 73 IN | DIASTOLIC BLOOD PRESSURE: 90 MMHG | BODY MASS INDEX: 30.35 KG/M2 | OXYGEN SATURATION: 100 % | HEART RATE: 97 BPM | SYSTOLIC BLOOD PRESSURE: 125 MMHG

## 2024-04-11 DIAGNOSIS — E10.65 UNCONTROLLED TYPE 1 DIABETES MELLITUS WITH HYPERGLYCEMIA: Primary | ICD-10-CM

## 2024-04-11 DIAGNOSIS — Z97.8 USES SELF-APPLIED CONTINUOUS GLUCOSE MONITORING DEVICE: ICD-10-CM

## 2024-04-11 LAB
EXPIRATION DATE: ABNORMAL
GLUCOSE BLDC GLUCOMTR-MCNC: 240 MG/DL (ref 70–99)
HBA1C MFR BLD: 7.8 % (ref 4.5–5.7)
Lab: ABNORMAL

## 2024-04-11 PROCEDURE — 82948 REAGENT STRIP/BLOOD GLUCOSE: CPT | Performed by: NURSE PRACTITIONER

## 2024-04-11 PROCEDURE — 95251 CONT GLUC MNTR ANALYSIS I&R: CPT | Performed by: NURSE PRACTITIONER

## 2024-04-11 PROCEDURE — 99214 OFFICE O/P EST MOD 30 MIN: CPT | Performed by: NURSE PRACTITIONER

## 2024-04-11 RX ORDER — INSULIN DEGLUDEC 100 U/ML
44 INJECTION, SOLUTION SUBCUTANEOUS DAILY
Qty: 39.6 ML | Refills: 1 | Status: SHIPPED | OUTPATIENT
Start: 2024-04-11 | End: 2024-10-08

## 2024-04-16 ENCOUNTER — OFFICE VISIT (OUTPATIENT)
Dept: FAMILY MEDICINE CLINIC | Facility: CLINIC | Age: 44
End: 2024-04-16
Payer: COMMERCIAL

## 2024-04-16 VITALS
SYSTOLIC BLOOD PRESSURE: 106 MMHG | TEMPERATURE: 96.6 F | BODY MASS INDEX: 29.77 KG/M2 | HEART RATE: 63 BPM | HEIGHT: 73 IN | OXYGEN SATURATION: 96 % | DIASTOLIC BLOOD PRESSURE: 60 MMHG | WEIGHT: 224.6 LBS

## 2024-04-16 DIAGNOSIS — M72.0 DUPUYTREN'S CONTRACTURE: ICD-10-CM

## 2024-04-16 DIAGNOSIS — H61.21 IMPACTED CERUMEN OF RIGHT EAR: Primary | ICD-10-CM

## 2024-04-16 DIAGNOSIS — I95.9 HYPOTENSION, UNSPECIFIED HYPOTENSION TYPE: ICD-10-CM

## 2024-04-16 DIAGNOSIS — R42 DIZZINESS: ICD-10-CM

## 2024-04-16 RX ORDER — MECLIZINE HYDROCHLORIDE 25 MG/1
25 TABLET ORAL 3 TIMES DAILY PRN
Qty: 30 TABLET | Refills: 0 | Status: SHIPPED | OUTPATIENT
Start: 2024-04-16

## 2024-04-16 NOTE — PROGRESS NOTES
"Chief Complaint  Dizziness    Subjective         George Hunter presents to [facility]  Patient presents to the send starts begins having nausea.  He states this has been going on for the past 2 to 3 days.  He does state that he has a history of vertigo.  Patient does state that he notices.  He was noted that his blood pressure was 106/60 upon arrival.  Patient was started on lisinopril diabetes.  I did ask him if he had a way of checking blood pressure at home and he denies this.  I did encourage him to get a monitor so he can check this on a routine basis at home.  Patient states that he has a nodule in his left palm that is tender to touch.  He denies the finger getting stuck in a flexed position at this time.  He request a referral to orthopedics for further evaluation and possible treatment.    Dizziness         Objective     Vitals:    04/16/24 0843   BP: 106/60   BP Location: Right arm   Patient Position: Sitting   Cuff Size: Adult   Pulse: 63   Temp: 96.6 °F (35.9 °C)   TempSrc: Temporal   SpO2: 96%   Weight: 102 kg (224 lb 9.6 oz)   Height: 185.4 cm (73\")      Body mass index is 29.63 kg/m².             Physical Exam  Vitals reviewed.   Constitutional:       Appearance: Normal appearance.   HENT:      Right Ear: Tympanic membrane and external ear normal. There is impacted cerumen.      Left Ear: Tympanic membrane, ear canal and external ear normal.      Nose: Nose normal.      Mouth/Throat:      Mouth: Mucous membranes are moist.      Pharynx: Oropharynx is clear.   Cardiovascular:      Rate and Rhythm: Normal rate and regular rhythm.      Pulses: Normal pulses.      Heart sounds: Normal heart sounds, S1 normal and S2 normal. No murmur heard.  Pulmonary:      Effort: Pulmonary effort is normal. No respiratory distress.      Breath sounds: Normal breath sounds.   Musculoskeletal:      Comments: Nodule noted to the left palm at the base of the 4th metacarpal    Skin:     " General: Skin is warm and dry.   Neurological:      Mental Status: He is alert and oriented to person, place, and time.   Psychiatric:         Attention and Perception: Attention normal.         Mood and Affect: Mood normal.         Behavior: Behavior normal.          Result Review :   The following data was reviewed by: MALCOM Farias on 04/16/2024:      Ear Cerumen Removal    Date/Time: 4/16/2024 9:00 AM    Performed by: Jak Colindres APRN  Authorized by: Jak Colindres APRN  Location details: right ear  Patient tolerance: patient tolerated the procedure well with no immediate complications  Comments: After irrigation the TM is without erythema or perforation  Procedure type: irrigation   Sedation:  Patient sedated: no            Assessment and Plan   Diagnoses and all orders for this visit:    1. Impacted cerumen of right ear (Primary)  -     Ear Cerumen Removal    2. Dupuytren's contracture  -     Ambulatory Referral to Orthopedic Surgery    3. Dizziness  -     meclizine (ANTIVERT) 25 MG tablet; Take 1 tablet by mouth 3 (Three) Times a Day As Needed for Dizziness.  Dispense: 30 tablet; Refill: 0    4. Hypotension, unspecified hypotension type    Did encourage patient to check blood pressure at home on a routine basis.  I explained if the blood pressure remains low that we will need to discuss utilizing lisinopril.  I also encouraged increasing fluid intake      Follow Up   Return if symptoms worsen or fail to improve.  Patient was given instructions and counseling regarding his condition or for health maintenance advice. Please see specific information pulled into the AVS if appropriate.

## 2024-04-16 NOTE — LETTER
April 16, 2024     Patient: George Hunter   YOB: 1980   Date of Visit: 4/16/2024       To Whom It May Concern:    It is my medical opinion that George Hunter may return to work in one days.  May return 4/17/24         Sincerely,        MALCOM Farias    CC: No Recipients

## 2024-05-09 ENCOUNTER — TELEMEDICINE (OUTPATIENT)
Dept: FAMILY MEDICINE CLINIC | Facility: TELEHEALTH | Age: 44
End: 2024-05-09
Payer: COMMERCIAL

## 2024-05-09 DIAGNOSIS — R10.9 ABDOMINAL PAIN, UNSPECIFIED ABDOMINAL LOCATION: Primary | ICD-10-CM

## 2024-05-09 DIAGNOSIS — R51.9 ACUTE NONINTRACTABLE HEADACHE, UNSPECIFIED HEADACHE TYPE: ICD-10-CM

## 2024-05-09 RX ORDER — FAMOTIDINE 20 MG/1
20 TABLET, FILM COATED ORAL 2 TIMES DAILY PRN
Qty: 20 TABLET | Refills: 0 | Status: SHIPPED | OUTPATIENT
Start: 2024-05-09

## 2024-06-05 ENCOUNTER — APPOINTMENT (OUTPATIENT)
Dept: CT IMAGING | Facility: HOSPITAL | Age: 44
End: 2024-06-05
Payer: COMMERCIAL

## 2024-06-05 ENCOUNTER — OFFICE VISIT (OUTPATIENT)
Dept: FAMILY MEDICINE CLINIC | Facility: CLINIC | Age: 44
End: 2024-06-05
Payer: COMMERCIAL

## 2024-06-05 ENCOUNTER — HOSPITAL ENCOUNTER (EMERGENCY)
Facility: HOSPITAL | Age: 44
Discharge: HOME | End: 2024-06-05
Attending: EMERGENCY MEDICINE | Admitting: EMERGENCY MEDICINE
Payer: COMMERCIAL

## 2024-06-05 VITALS
OXYGEN SATURATION: 94 % | HEIGHT: 73 IN | DIASTOLIC BLOOD PRESSURE: 80 MMHG | TEMPERATURE: 98.1 F | HEART RATE: 64 BPM | BODY MASS INDEX: 29.88 KG/M2 | SYSTOLIC BLOOD PRESSURE: 122 MMHG | WEIGHT: 225.5 LBS

## 2024-06-05 VITALS
TEMPERATURE: 98.2 F | HEART RATE: 74 BPM | OXYGEN SATURATION: 95 % | DIASTOLIC BLOOD PRESSURE: 90 MMHG | RESPIRATION RATE: 18 BRPM | BODY MASS INDEX: 29.66 KG/M2 | WEIGHT: 223.77 LBS | HEIGHT: 73 IN | SYSTOLIC BLOOD PRESSURE: 131 MMHG

## 2024-06-05 DIAGNOSIS — R10.84 GENERALIZED ABDOMINAL PAIN: Primary | ICD-10-CM

## 2024-06-05 DIAGNOSIS — J01.40 ACUTE NON-RECURRENT PANSINUSITIS: Primary | ICD-10-CM

## 2024-06-05 DIAGNOSIS — J01.90 ACUTE SINUSITIS, RECURRENCE NOT SPECIFIED, UNSPECIFIED LOCATION: ICD-10-CM

## 2024-06-05 LAB
ALBUMIN SERPL-MCNC: 4.3 G/DL (ref 3.5–5.2)
ALBUMIN/GLOB SERPL: 1.4 G/DL
ALP SERPL-CCNC: 105 U/L (ref 39–117)
ALT SERPL W P-5'-P-CCNC: 20 U/L (ref 1–41)
ANION GAP SERPL CALCULATED.3IONS-SCNC: 10.3 MMOL/L (ref 5–15)
AST SERPL-CCNC: 16 U/L (ref 1–40)
BASOPHILS # BLD AUTO: 0.05 10*3/MM3 (ref 0–0.2)
BASOPHILS NFR BLD AUTO: 0.7 % (ref 0–1.5)
BILIRUB SERPL-MCNC: 0.7 MG/DL (ref 0–1.2)
BILIRUB UR QL STRIP: NEGATIVE
BUN SERPL-MCNC: 12 MG/DL (ref 6–20)
BUN/CREAT SERPL: 13.5 (ref 7–25)
CALCIUM SPEC-SCNC: 9.2 MG/DL (ref 8.6–10.5)
CHLORIDE SERPL-SCNC: 100 MMOL/L (ref 98–107)
CLARITY UR: CLEAR
CO2 SERPL-SCNC: 27.7 MMOL/L (ref 22–29)
COLOR UR: YELLOW
CREAT SERPL-MCNC: 0.89 MG/DL (ref 0.76–1.27)
DEPRECATED RDW RBC AUTO: 37.2 FL (ref 37–54)
EGFRCR SERPLBLD CKD-EPI 2021: 108.4 ML/MIN/1.73
EOSINOPHIL # BLD AUTO: 0.12 10*3/MM3 (ref 0–0.4)
EOSINOPHIL NFR BLD AUTO: 1.7 % (ref 0.3–6.2)
ERYTHROCYTE [DISTWIDTH] IN BLOOD BY AUTOMATED COUNT: 11.9 % (ref 12.3–15.4)
GLOBULIN UR ELPH-MCNC: 3.1 GM/DL
GLUCOSE SERPL-MCNC: 99 MG/DL (ref 65–99)
GLUCOSE UR STRIP-MCNC: NEGATIVE MG/DL
HCT VFR BLD AUTO: 51.9 % (ref 37.5–51)
HGB BLD-MCNC: 17.9 G/DL (ref 13–17.7)
HGB UR QL STRIP.AUTO: NEGATIVE
HOLD SPECIMEN: NORMAL
HOLD SPECIMEN: NORMAL
IMM GRANULOCYTES # BLD AUTO: 0.02 10*3/MM3 (ref 0–0.05)
IMM GRANULOCYTES NFR BLD AUTO: 0.3 % (ref 0–0.5)
KETONES UR QL STRIP: NEGATIVE
LEUKOCYTE ESTERASE UR QL STRIP.AUTO: NEGATIVE
LIPASE SERPL-CCNC: 13 U/L (ref 13–60)
LYMPHOCYTES # BLD AUTO: 2.5 10*3/MM3 (ref 0.7–3.1)
LYMPHOCYTES NFR BLD AUTO: 34.6 % (ref 19.6–45.3)
MCH RBC QN AUTO: 29.5 PG (ref 26.6–33)
MCHC RBC AUTO-ENTMCNC: 34.5 G/DL (ref 31.5–35.7)
MCV RBC AUTO: 85.6 FL (ref 79–97)
MONOCYTES # BLD AUTO: 0.41 10*3/MM3 (ref 0.1–0.9)
MONOCYTES NFR BLD AUTO: 5.7 % (ref 5–12)
NEUTROPHILS NFR BLD AUTO: 4.13 10*3/MM3 (ref 1.7–7)
NEUTROPHILS NFR BLD AUTO: 57 % (ref 42.7–76)
NITRITE UR QL STRIP: NEGATIVE
NRBC BLD AUTO-RTO: 0 /100 WBC (ref 0–0.2)
PH UR STRIP.AUTO: 7 [PH] (ref 5–8)
PLATELET # BLD AUTO: 320 10*3/MM3 (ref 140–450)
PMV BLD AUTO: 10 FL (ref 6–12)
POTASSIUM SERPL-SCNC: 4.1 MMOL/L (ref 3.5–5.2)
PROT SERPL-MCNC: 7.4 G/DL (ref 6–8.5)
PROT UR QL STRIP: NEGATIVE
RBC # BLD AUTO: 6.06 10*6/MM3 (ref 4.14–5.8)
SODIUM SERPL-SCNC: 138 MMOL/L (ref 136–145)
SP GR UR STRIP: >1.03 (ref 1–1.03)
UROBILINOGEN UR QL STRIP: ABNORMAL
WBC NRBC COR # BLD AUTO: 7.23 10*3/MM3 (ref 3.4–10.8)
WHOLE BLOOD HOLD COAG: NORMAL
WHOLE BLOOD HOLD SPECIMEN: NORMAL

## 2024-06-05 PROCEDURE — 25510000001 IOPAMIDOL PER 1 ML: Performed by: EMERGENCY MEDICINE

## 2024-06-05 PROCEDURE — 96374 THER/PROPH/DIAG INJ IV PUSH: CPT

## 2024-06-05 PROCEDURE — 74177 CT ABD & PELVIS W/CONTRAST: CPT

## 2024-06-05 PROCEDURE — 25810000003 SODIUM CHLORIDE 0.9 % SOLUTION

## 2024-06-05 PROCEDURE — 80053 COMPREHEN METABOLIC PANEL: CPT | Performed by: EMERGENCY MEDICINE

## 2024-06-05 PROCEDURE — 25010000002 KETOROLAC TROMETHAMINE PER 15 MG

## 2024-06-05 PROCEDURE — 83690 ASSAY OF LIPASE: CPT | Performed by: EMERGENCY MEDICINE

## 2024-06-05 PROCEDURE — 99285 EMERGENCY DEPT VISIT HI MDM: CPT

## 2024-06-05 PROCEDURE — 96375 TX/PRO/DX INJ NEW DRUG ADDON: CPT

## 2024-06-05 PROCEDURE — 99213 OFFICE O/P EST LOW 20 MIN: CPT

## 2024-06-05 PROCEDURE — 25010000002 ONDANSETRON PER 1 MG

## 2024-06-05 PROCEDURE — 36415 COLL VENOUS BLD VENIPUNCTURE: CPT

## 2024-06-05 PROCEDURE — 85025 COMPLETE CBC W/AUTO DIFF WBC: CPT

## 2024-06-05 PROCEDURE — 81003 URINALYSIS AUTO W/O SCOPE: CPT | Performed by: EMERGENCY MEDICINE

## 2024-06-05 RX ORDER — PREDNISONE 20 MG/1
20 TABLET ORAL 2 TIMES DAILY
Qty: 10 TABLET | Refills: 0 | Status: SHIPPED | OUTPATIENT
Start: 2024-06-05

## 2024-06-05 RX ORDER — SODIUM CHLORIDE 0.9 % (FLUSH) 0.9 %
10 SYRINGE (ML) INJECTION AS NEEDED
Status: DISCONTINUED | OUTPATIENT
Start: 2024-06-05 | End: 2024-06-06 | Stop reason: HOSPADM

## 2024-06-05 RX ORDER — KETOROLAC TROMETHAMINE 30 MG/ML
30 INJECTION, SOLUTION INTRAMUSCULAR; INTRAVENOUS ONCE
Status: COMPLETED | OUTPATIENT
Start: 2024-06-05 | End: 2024-06-05

## 2024-06-05 RX ORDER — DOXYCYCLINE HYCLATE 100 MG/1
100 CAPSULE ORAL 2 TIMES DAILY
Qty: 14 CAPSULE | Refills: 0 | Status: SHIPPED | OUTPATIENT
Start: 2024-06-05

## 2024-06-05 RX ORDER — DICYCLOMINE HYDROCHLORIDE 10 MG/1
20 CAPSULE ORAL ONCE
Status: COMPLETED | OUTPATIENT
Start: 2024-06-05 | End: 2024-06-05

## 2024-06-05 RX ORDER — DICYCLOMINE HCL 20 MG
20 TABLET ORAL EVERY 8 HOURS PRN
Qty: 20 TABLET | Refills: 0 | Status: SHIPPED | OUTPATIENT
Start: 2024-06-05

## 2024-06-05 RX ORDER — ONDANSETRON 4 MG/1
4 TABLET, ORALLY DISINTEGRATING ORAL EVERY 8 HOURS PRN
Qty: 30 TABLET | Refills: 0 | Status: SHIPPED | OUTPATIENT
Start: 2024-06-05 | End: 2024-06-15

## 2024-06-05 RX ORDER — ONDANSETRON 2 MG/ML
4 INJECTION INTRAMUSCULAR; INTRAVENOUS ONCE
Status: COMPLETED | OUTPATIENT
Start: 2024-06-05 | End: 2024-06-05

## 2024-06-05 RX ORDER — GUAIFENESIN 600 MG/1
1200 TABLET, EXTENDED RELEASE ORAL 2 TIMES DAILY
Qty: 56 TABLET | Refills: 0 | Status: SHIPPED | OUTPATIENT
Start: 2024-06-05

## 2024-06-05 RX ORDER — MONTELUKAST SODIUM 10 MG/1
10 TABLET ORAL NIGHTLY
Qty: 30 TABLET | Refills: 0 | Status: SHIPPED | OUTPATIENT
Start: 2024-06-05

## 2024-06-05 RX ADMIN — DICYCLOMINE HYDROCHLORIDE 20 MG: 10 CAPSULE ORAL at 21:29

## 2024-06-05 RX ADMIN — KETOROLAC TROMETHAMINE 30 MG: 30 INJECTION, SOLUTION INTRAMUSCULAR; INTRAVENOUS at 19:57

## 2024-06-05 RX ADMIN — IOPAMIDOL 100 ML: 755 INJECTION, SOLUTION INTRAVENOUS at 20:16

## 2024-06-05 RX ADMIN — SODIUM CHLORIDE 1000 ML: 9 INJECTION, SOLUTION INTRAVENOUS at 19:56

## 2024-06-05 RX ADMIN — ONDANSETRON 4 MG: 2 INJECTION INTRAMUSCULAR; INTRAVENOUS at 19:57

## 2024-06-05 RX ADMIN — IOPAMIDOL 100 ML: 755 INJECTION, SOLUTION INTRAVENOUS at 21:11

## 2024-06-05 NOTE — PROGRESS NOTES
"George Hunter presents to [facility] who presents to the clinic for ear pain, ears feeling clogged, concern for infection.      History of Present Illness  This is a 43-year-old male who presents to the clinic with complaints of ear pain, ears feeling clogged, and concern for possible infection.    Patient states that his symptoms started a few days ago, states that he feels really congested, his sinuses are really tender, he typically gets sick multiple times a year due to allergies causing a sinus infection and this feels just exactly like that.  Patient states that both of his ears feel muffled, his left ear is causing some pain whereas his right ear does not really cause pain but has does have decreased hearing because he has some hearing loss in that right ear.  States he has tried several different medications for allergies, but the one that works best is Zyrtec-D, states that he is not getting any relief there and/or he is not getting any relief from over-the-counter Sudafed either.  Patient states he has not had fever, no shortness of breath or chest pain, some mild coughing.  Does feel like his stomach is upset because of all of the drainage that he is had from the possible sinus infection.  Denies any other symptoms.    The following portions of the patient's history were personally reviewed and updated as appropriate: allergies, current medications, past medical history, past surgical history, past family history, and past social history.       Objective   Vital Signs:   /80 (BP Location: Left arm, Patient Position: Sitting, Cuff Size: Adult)   Pulse 64   Temp 98.1 °F (36.7 °C)   Ht 185.4 cm (73\")   Wt 102 kg (225 lb 8 oz)   SpO2 94%   BMI 29.75 kg/m²     Body mass index is 29.75 kg/m².    All labs, imaging, test results, and specialty provider notes reviewed with patient.     Physical Exam  Vitals reviewed.   Constitutional:       Appearance: Normal " appearance.   HENT:      Right Ear: Tympanic membrane is erythematous and bulging.      Left Ear: Tympanic membrane is injected, erythematous and bulging.   Cardiovascular:      Rate and Rhythm: Normal rate and regular rhythm.      Pulses: Normal pulses.      Heart sounds: Normal heart sounds.   Pulmonary:      Effort: Pulmonary effort is normal.      Breath sounds: Normal breath sounds.   Neurological:      General: No focal deficit present.      Mental Status: He is alert and oriented to person, place, and time.                               Assessment and Plan:  Diagnoses and all orders for this visit:    1. Acute non-recurrent pansinusitis (Primary)    Other orders  -     doxycycline (VIBRAMYCIN) 100 MG capsule; Take 1 capsule by mouth 2 (Two) Times a Day.  Dispense: 14 capsule; Refill: 0  -     predniSONE (DELTASONE) 20 MG tablet; Take 1 tablet by mouth 2 (Two) Times a Day.  Dispense: 10 tablet; Refill: 0  -     guaiFENesin (Mucinex) 600 MG 12 hr tablet; Take 2 tablets by mouth 2 (Two) Times a Day.  Dispense: 56 tablet; Refill: 0  -     montelukast (Singulair) 10 MG tablet; Take 1 tablet by mouth Every Night.  Dispense: 30 tablet; Refill: 0    Will go ahead and treat patient sinus infection with doxycycline as he has had several rounds of a Z-Chas recently and I feel like it will be as effective to treat the current upper respiratory infection.  Will also give a course of steroids to help with the inflammation, drainage, provide patient with Mucinex to help with further decongestion.  Did also discussed with patient that we will start him on Singulair, he can take this in conjunction with his Zyrtec-D but at night, see if this will help prevent future sinus infections in the future.  Patient would likely benefit from allergy referral and possible allergy injections with how frequent he has sinus infections.      Follow Up:  No follow-ups on file.    Patient was given instructions and counseling regarding his  condition or for health maintenance advice. Please see specific information pulled into the AVS if appropriate.

## 2024-06-05 NOTE — ED PROVIDER NOTES
Time: 7:11 PM EDT  Date of encounter:  6/5/2024  Independent Historian/Clinical History and Information was obtained by:   Patient    History is limited by: N/A    Chief Complaint: Nausea, abdominal cramping      History of Present Illness:  Patient is a 43 y.o. year old male who presents to the emergency department for evaluation of nausea.  Patient states he has been having abdominal cramps for the last few days and then today he is experiencing nausea.  He also reports having a headache.  Was diagnosed with sinus infection today by his PCP.  He was prescribed antibiotics but has not started them yet.  Took Zofran at home with no resolve.  Denies vomiting.    HPI    Patient Care Team  Primary Care Provider: Jak Colindres APRN    Past Medical History:     Allergies   Allergen Reactions    Penicillins Anaphylaxis     Childhood allergy      Codeine Itching     Past Medical History:   Diagnosis Date    Allergic     Allergies     Anxiety     Back injury     Broken bones     Deafness     Diabetes     Diabetes mellitus type I     Foot pain, bilateral 2018    Fracture     Hammertoe     Head injury     Hernia cerebri     Hyperlipidemia     Hypothyroidism     Neuropathy in diabetes 2009    Plantar fascial fibromatosis of both feet 2018    Toe pain, right      Past Surgical History:   Procedure Laterality Date    HERNIA REPAIR      TOENAIL EXCISION  2021     Family History   Family history unknown: Yes       Home Medications:  Prior to Admission medications    Medication Sig Start Date End Date Taking? Authorizing Provider   cetirizine-pseudoephedrine (ZyrTEC-D) 5-120 MG per 12 hr tablet Take 1 tablet by mouth 2 (Two) Times a Day. 1/15/22   Annia Jackson MD   Continuous Blood Gluc Sensor (FreeStyle Nadine 3 Sensor) misc 1 each by Other route Every 14 (Fourteen) Days. 1/11/24   Indu Lloyd APRN   cyclobenzaprine (FLEXERIL) 10 MG tablet Take 1 tablet by mouth 2 (Two) Times a Day As Needed for Muscle Spasms. 12/20/23    Jak Colindres APRN   doxycycline (VIBRAMYCIN) 100 MG capsule Take 1 capsule by mouth 2 (Two) Times a Day. 6/5/24   Nia Rodas APRN   Euthyrox 25 MCG tablet Take 1 tablet by mouth Daily. 7/12/21   Provider, MD Rudolph   famotidine (Pepcid) 20 MG tablet Take 1 tablet by mouth 2 (Two) Times a Day As Needed for Heartburn. 5/9/24   Cristina Estrella APRN   guaiFENesin (Mucinex) 600 MG 12 hr tablet Take 2 tablets by mouth 2 (Two) Times a Day. 6/5/24   Nia Rodas APRN   insulin degludec (Tresiba FlexTouch) 100 UNIT/ML solution pen-injector injection Inject 44 Units under the skin into the appropriate area as directed Daily for 180 days. 4/11/24 10/8/24  Indu Lloyd APRN   montelukast (Singulair) 10 MG tablet Take 1 tablet by mouth Every Night. 6/5/24   Nia Rodas APRN   NovoLOG FlexPen 100 UNIT/ML solution pen-injector sc pen INJECT 30 UNITS SUBCUTANEOUSLY THREE TIMES DAILY AS DIRECTED WITH MEALS 3/11/24   Indu Lloyd APRN   omeprazole (priLOSEC) 40 MG capsule TAKE 1 CAPSULE BY MOUTH ONCE DAILY BEFORE A MEAL 4/10/24   Jak Colindres APRN   predniSONE (DELTASONE) 20 MG tablet Take 1 tablet by mouth 2 (Two) Times a Day. 6/5/24   Nia Rodas APRN   simvastatin (ZOCOR) 10 MG tablet Take 1 tablet by mouth Every Night. 11/11/21   Indu Lloyd APRN        Social History:   Social History     Tobacco Use    Smoking status: Former     Current packs/day: 0.50     Average packs/day: 0.5 packs/day for 10.0 years (5.0 ttl pk-yrs)     Types: Cigarettes, Cigars    Smokeless tobacco: Never   Vaping Use    Vaping status: Never Used   Substance Use Topics    Alcohol use: Not Currently    Drug use: Never         Review of Systems:  Review of Systems   HENT:  Positive for sinus pain.    Gastrointestinal:  Positive for nausea.   Neurological:  Positive for headaches.        Physical Exam:  /90   Pulse 74   Temp 98.2 °F (36.8 °C) (Oral)   Resp 18   Ht  "185.4 cm (73\")   Wt 101 kg (223 lb 12.3 oz)   SpO2 95%   BMI 29.52 kg/m²     Physical Exam  Vitals and nursing note reviewed.   Constitutional:       General: He is not in acute distress.     Appearance: Normal appearance. He is not toxic-appearing.   HENT:      Head: Normocephalic and atraumatic.      Right Ear: Tympanic membrane, ear canal and external ear normal.      Left Ear: Tympanic membrane, ear canal and external ear normal.      Nose: Nose normal.      Right Sinus: Maxillary sinus tenderness and frontal sinus tenderness present.      Left Sinus: Maxillary sinus tenderness and frontal sinus tenderness present.      Mouth/Throat:      Mouth: Mucous membranes are moist.   Eyes:      General: No scleral icterus.     Extraocular Movements: Extraocular movements intact.      Conjunctiva/sclera: Conjunctivae normal.   Cardiovascular:      Rate and Rhythm: Normal rate and regular rhythm.      Pulses:           Radial pulses are 2+ on the right side and 2+ on the left side.      Heart sounds: Normal heart sounds.   Pulmonary:      Effort: Pulmonary effort is normal. No respiratory distress.      Breath sounds: Normal breath sounds. No wheezing or rhonchi.   Abdominal:      General: Abdomen is flat. Bowel sounds are normal. There is no distension.      Palpations: Abdomen is soft.      Tenderness: There is generalized no abdominal tenderness. There is no guarding or rebound.   Musculoskeletal:         General: Normal range of motion.      Cervical back: Normal range of motion and neck supple.      Right lower leg: No edema.      Left lower leg: No edema.   Skin:     General: Skin is warm and dry.      Coloration: Skin is not cyanotic.   Neurological:      Mental Status: He is alert and oriented to person, place, and time. Mental status is at baseline.   Psychiatric:         Attention and Perception: Attention and perception normal.         Mood and Affect: Mood normal.          "       Procedures:  Procedures      Medical Decision Making:      Comorbidities that affect care:    Diabetes, Thyroid Disease    External Notes reviewed:    Previous Clinic Note: PCP visit today on 6/5/2024, was prescribed doxycycline for sinusitis      The following orders were placed and all results were independently analyzed by me:  Orders Placed This Encounter   Procedures    CT Abdomen Pelvis With Contrast    [place] Draw    Comprehensive Metabolic Panel    Lipase    Urinalysis With Microscopic If Indicated (No Culture) - Urine, Clean Catch    CBC Auto Differential    NPO Diet NPO Type: Strict NPO    Undress & Gown    Insert Peripheral IV    CBC & Differential    Green Top (Gel)    Lavender Top    Gold Top - SST    Light Blue Top       Medications Given in the Emergency Department:  Medications   sodium chloride 0.9 % flush 10 mL (has no administration in time range)   sodium chloride 0.9 % bolus 1,000 mL (0 mL Intravenous Stopped 6/5/24 2042)   ondansetron (ZOFRAN) injection 4 mg (4 mg Intravenous Given 6/5/24 1957)   ketorolac (TORADOL) injection 30 mg (30 mg Intravenous Given 6/5/24 1957)   iopamidol (ISOVUE-370) 76 % injection 100 mL (100 mL Intravenous Given 6/5/24 2016)   iopamidol (ISOVUE-370) 76 % injection 100 mL (100 mL Intravenous Given 6/5/24 2111)   dicyclomine (BENTYL) capsule 20 mg (20 mg Oral Given 6/5/24 2129)        ED Course:    ED Course as of 06/05/24 2321 Wed Jun 05, 2024   1911 -- PROVIDER IN TRIAGE NOTE ---    The patient was evaluated by me, MALCOM Solitario, in triage. Orders were placed and the patient is currently awaiting disposition.    [CB]   2058 CT Abdomen Pelvis With Contrast  Mild bladder wall thickening, otherwise no acute findings [CW]      ED Course User Index  [CB] Venus Fergusno APRN  [CW] Anat Cifuentes APRN       Labs:    Lab Results (last 24 hours)       Procedure Component Value Units Date/Time    CBC & Differential [[id number]]  (Abnormal) Collected:  06/05/24 1846    Specimen: Blood from Arm, Right Updated: 06/05/24 1919    Narrative:      The following orders were created for panel order CBC & Differential.  Procedure                               Abnormality         Status                     ---------                               -----------         ------                     CBC Auto Differential[[id number]]        Abnormal            Final result                 Please view results for these tests on the individual orders.    Comprehensive Metabolic Panel [[id number]] Collected: 06/05/24 1846    Specimen: Blood from Arm, Right Updated: 06/05/24 1941     Glucose 99 mg/dL      BUN 12 mg/dL      Creatinine 0.89 mg/dL      Sodium 138 mmol/L      Potassium 4.1 mmol/L      Chloride 100 mmol/L      CO2 27.7 mmol/L      Calcium 9.2 mg/dL      Total Protein 7.4 g/dL      Albumin 4.3 g/dL      ALT (SGPT) 20 U/L      AST (SGOT) 16 U/L      Alkaline Phosphatase 105 U/L      Total Bilirubin 0.7 mg/dL      Globulin 3.1 gm/dL      A/G Ratio 1.4 g/dL      BUN/Creatinine Ratio 13.5     Anion Gap 10.3 mmol/L      eGFR 108.4 mL/min/1.73     Narrative:      GFR Normal >60  Chronic Kidney Disease <60  Kidney Failure <15      Lipase [[id number]]  (Normal) Collected: 06/05/24 1846    Specimen: Blood from Arm, Right Updated: 06/05/24 1941     Lipase 13 U/L     CBC Auto Differential [[id number]]  (Abnormal) Collected: 06/05/24 1846    Specimen: Blood from Arm, Right Updated: 06/05/24 1919     WBC 7.23 10*3/mm3      RBC 6.06 10*6/mm3      Hemoglobin 17.9 g/dL      Hematocrit 51.9 %      MCV 85.6 fL      MCH 29.5 pg      MCHC 34.5 g/dL      RDW 11.9 %      RDW-SD 37.2 fl      MPV 10.0 fL      Platelets 320 10*3/mm3      Neutrophil % 57.0 %      Lymphocyte % 34.6 %      Monocyte % 5.7 %      Eosinophil % 1.7 %      Basophil % 0.7 %      Immature Grans % 0.3 %      Neutrophils, Absolute 4.13 10*3/mm3      Lymphocytes, Absolute 2.50 10*3/mm3      Monocytes, Absolute 0.41 10*3/mm3       Eosinophils, Absolute 0.12 10*3/mm3      Basophils, Absolute 0.05 10*3/mm3      Immature Grans, Absolute 0.02 10*3/mm3      nRBC 0.0 /100 WBC     Urinalysis With Microscopic If Indicated (No Culture) - Urine, Clean Catch [[id number]]  (Abnormal) Collected: 06/05/24 2102    Specimen: Urine, Clean Catch Updated: 06/05/24 2123     Color, UA Yellow     Appearance, UA Clear     pH, UA 7.0     Specific Gravity, UA >1.030     Glucose, UA Negative     Ketones, UA Negative     Bilirubin, UA Negative     Blood, UA Negative     Protein, UA Negative     Leuk Esterase, UA Negative     Nitrite, UA Negative     Urobilinogen, UA 0.2 E.U./dL    Narrative:      Urine microscopic not indicated.             Imaging:    CT Abdomen Pelvis With Contrast    Result Date: 6/5/2024  CT ABDOMEN PELVIS W CONTRAST Date of Exam: 6/5/2024 8:25 PM EDT Indication: lower abd pain. Comparison: CT abdomen pelvis dated 12/18/2021 Technique: Axial CT images were obtained of the abdomen and pelvis after the uneventful intravenous administration of iodinated contrast. Reconstructed coronal and sagittal images were also obtained. Automated exposure control and iterative construction methods were used. Findings: Lung Bases:  The visualized lung bases and lower mediastinal structures are unremarkable. There is a small hiatal hernia Liver:Liver is normal in size and CT density. No focal lesions. Biliary/Gallbladder: The gallbladder is without evidence of radiopaque stones. The biliary tree is nondilated. Spleen:Spleen is normal in size and CT density. Pancreas: Pancreas shows homogeneous density. There is no evidence of pancreatic mass or peripancreatic fluid. Kidneys: Kidneys are normal in size. There are no stones or hydronephrosis. Adrenals: Adrenal glands are unremarkable. Retroperitoneal/Lymph Nodes/Vasculature: No retroperitoneal adenopathy is identified by size criteria. Gastrointestinal/Mesentery: The bowel loops are non-dilated without definite wall  thickening or mass. The appendix appears within normal limits. No evidence of obstruction. No free air. Bladder: There is mild diffuse bladder wall thickening may represent cystitis. No intraluminal calcifications   Bony Structures:  Visualized bony structures are consistent with the patient's age.     Impression: Small hiatal hernia Mild diffuse bladder wall thickening could represent cystitis, please correlate clinically Otherwise essentially unremarkable CT of the abdomen and pelvis Electronically Signed: Tommy Dove MD  6/5/2024 8:45 PM EDT  Workstation ID: [id number]       Differential Diagnosis and Discussion:    Abdominal Pain: Based on the patient's signs and symptoms, I considered abdominal aortic aneurysm, small bowel obstruction, pancreatitis, acute cholecystitis, acute appendecitis, peptic ulcer disease, gastritis, colitis, endocrine disorders, irritable bowel syndrome and other differential diagnosis an etiology of the patient's abdominal pain.    All labs were reviewed and interpreted by me.  CT scan radiology impression was interpreted by me.    MDM  Number of Diagnoses or Management Options  Acute sinusitis, recurrence not specified, unspecified location  Generalized abdominal pain  Diagnosis management comments: Symptoms are consistent with sinusitis.  Advised the patient to hold off on starting antibiotics as his symptoms have only been ongoing for 3 days and he is afebrile.  Counseled on home self-care measures to include Ashby pot, decongestants.  CT scan abdomen pelvis is negative for acute intra-abdominal pathology.  The patient has a soft and benign abdominal exam. The patient is now resting comfortably and feels better after medications, is alert, and is in no distress. The patient is able to tolerate po intake in the ED. The patient´s labs were reviewed and hydration status was assessed. The patient has no signs of acute renal failure, sepsis, or dehydration that warrants admission to the  hospital. The vital signs have been stable. The patient's condition is stable and appropriate for discharge. The patient will pursue further outpatient evaluation with the primary care physician or designated physician. The patient and/or caregivers have expressed a clear and thorough understanding and agreed to follow-up as instructed.       Amount and/or Complexity of Data Reviewed  Clinical lab tests: ordered and reviewed  Tests in the radiology section of CPT®: ordered and reviewed  Tests in the medicine section of CPT®: reviewed and ordered  Decide to obtain previous medical records or to obtain history from someone other than the patient: yes    Risk of Complications, Morbidity, and/or Mortality  Presenting problems: minimal  Diagnostic procedures: minimal  Management options: minimal    Patient Progress  Patient progress: stable                 Patient Care Considerations:    ANTIBIOTICS: I considered prescribing antibiotics as an outpatient however no bacterial focus of infection was found.      Consultants/Shared Management Plan:        Social Determinants of Health:    Patient is independent, reliable, and has access to care.       Disposition and Care Coordination:    Discharged: The patient is suitable and stable for discharge with no need for consideration of admission.    I have explained discharge medications and the need for follow up with the patient/caretakers. This was also printed in the discharge instructions. Patient was discharged with the following medications and follow up:      Medication List        New Prescriptions      dicyclomine 20 MG tablet  Commonly known as: BENTYL  Take 1 tablet by mouth Every 8 (Eight) Hours As Needed (Abdominal cramping).     ondansetron ODT 4 MG disintegrating tablet  Commonly known as: ZOFRAN-ODT  Place 1 tablet on the tongue Every 8 (Eight) Hours As Needed for Nausea or Vomiting for up to 10 days.               Where to Get Your Medications        These  medications were sent to [facility] [address] - [place], [place] - [address] [facility] - [phone]  - [phone]  [address] [facility] [place] [address]      Phone: [phone]   dicyclomine 20 MG tablet  ondansetron ODT 4 MG disintegrating tablet      No follow-up provider specified.     Final diagnoses:   Generalized abdominal pain   Acute sinusitis, recurrence not specified, unspecified location        ED Disposition       ED Disposition   Discharge    Condition   Stable    Comment   --               This medical record created using voice recognition software.             Anat Cifuentes, APRN  06/05/24 7825

## 2024-06-06 NOTE — DISCHARGE INSTRUCTIONS
Your lab work and CT scan did not show any concerning findings.    For your sinusitis you can use a Martina pot to relieve congestion and headache.  Mucinex with a full glass of water may help as well.  Take Tylenol as needed for pain.  Start taking the antibiotic if your symptoms have been going on longer than 10 days or you develop a fever.    Be sure to follow-up with your PCP for further evaluation of symptoms, especially if you do not improve.    Return for worsening symptoms such as fever greater 100.5, uncontrollable vomiting, worsening abdominal pain, or any new concerns.

## 2024-06-25 ENCOUNTER — LAB (OUTPATIENT)
Dept: LAB | Facility: HOSPITAL | Age: 44
End: 2024-06-25
Payer: COMMERCIAL

## 2024-06-25 ENCOUNTER — OFFICE VISIT (OUTPATIENT)
Dept: FAMILY MEDICINE CLINIC | Facility: CLINIC | Age: 44
End: 2024-06-25
Payer: COMMERCIAL

## 2024-06-25 VITALS
HEIGHT: 73 IN | DIASTOLIC BLOOD PRESSURE: 70 MMHG | HEART RATE: 90 BPM | RESPIRATION RATE: 16 BRPM | TEMPERATURE: 96.3 F | OXYGEN SATURATION: 96 % | BODY MASS INDEX: 29.55 KG/M2 | WEIGHT: 223 LBS | SYSTOLIC BLOOD PRESSURE: 102 MMHG

## 2024-06-25 DIAGNOSIS — E10.65 TYPE 1 DIABETES MELLITUS WITH HYPERGLYCEMIA: ICD-10-CM

## 2024-06-25 DIAGNOSIS — R20.2 NUMBNESS AND TINGLING OF LEFT UPPER EXTREMITY: ICD-10-CM

## 2024-06-25 DIAGNOSIS — R20.2 NUMBNESS AND TINGLING OF LEFT UPPER EXTREMITY: Primary | ICD-10-CM

## 2024-06-25 DIAGNOSIS — R53.83 OTHER FATIGUE: ICD-10-CM

## 2024-06-25 DIAGNOSIS — E03.9 HYPOTHYROIDISM, UNSPECIFIED TYPE: ICD-10-CM

## 2024-06-25 DIAGNOSIS — R20.0 NUMBNESS AND TINGLING OF LEFT UPPER EXTREMITY: Primary | ICD-10-CM

## 2024-06-25 DIAGNOSIS — R20.0 NUMBNESS AND TINGLING OF LEFT UPPER EXTREMITY: ICD-10-CM

## 2024-06-25 LAB
ALBUMIN SERPL-MCNC: 4.4 G/DL (ref 3.5–5.2)
ALBUMIN/GLOB SERPL: 1.7 G/DL
ALP SERPL-CCNC: 99 U/L (ref 39–117)
ALT SERPL W P-5'-P-CCNC: 22 U/L (ref 1–41)
ANION GAP SERPL CALCULATED.3IONS-SCNC: 10.5 MMOL/L (ref 5–15)
AST SERPL-CCNC: 21 U/L (ref 1–40)
BASOPHILS # BLD AUTO: 0.05 10*3/MM3 (ref 0–0.2)
BASOPHILS NFR BLD AUTO: 0.8 % (ref 0–1.5)
BILIRUB SERPL-MCNC: 0.6 MG/DL (ref 0–1.2)
BUN SERPL-MCNC: 14 MG/DL (ref 6–20)
BUN/CREAT SERPL: 15.7 (ref 7–25)
CALCIUM SPEC-SCNC: 10 MG/DL (ref 8.6–10.5)
CHLORIDE SERPL-SCNC: 106 MMOL/L (ref 98–107)
CHOLEST SERPL-MCNC: 204 MG/DL (ref 0–200)
CO2 SERPL-SCNC: 23.5 MMOL/L (ref 22–29)
CREAT SERPL-MCNC: 0.89 MG/DL (ref 0.76–1.27)
DEPRECATED RDW RBC AUTO: 38.3 FL (ref 37–54)
EGFRCR SERPLBLD CKD-EPI 2021: 108.4 ML/MIN/1.73
EOSINOPHIL # BLD AUTO: 0.13 10*3/MM3 (ref 0–0.4)
EOSINOPHIL NFR BLD AUTO: 2.1 % (ref 0.3–6.2)
ERYTHROCYTE [DISTWIDTH] IN BLOOD BY AUTOMATED COUNT: 12.5 % (ref 12.3–15.4)
GLOBULIN UR ELPH-MCNC: 2.6 GM/DL
GLUCOSE SERPL-MCNC: 95 MG/DL (ref 65–99)
HBA1C MFR BLD: 7.4 % (ref 4.8–5.6)
HCT VFR BLD AUTO: 52.7 % (ref 37.5–51)
HDLC SERPL-MCNC: 42 MG/DL (ref 40–60)
HGB BLD-MCNC: 18.3 G/DL (ref 13–17.7)
IMM GRANULOCYTES # BLD AUTO: 0.01 10*3/MM3 (ref 0–0.05)
IMM GRANULOCYTES NFR BLD AUTO: 0.2 % (ref 0–0.5)
IRON 24H UR-MRATE: 108 MCG/DL (ref 59–158)
LDLC SERPL CALC-MCNC: 148 MG/DL (ref 0–100)
LDLC/HDLC SERPL: 3.48 {RATIO}
LYMPHOCYTES # BLD AUTO: 2.56 10*3/MM3 (ref 0.7–3.1)
LYMPHOCYTES NFR BLD AUTO: 41.4 % (ref 19.6–45.3)
MCH RBC QN AUTO: 30 PG (ref 26.6–33)
MCHC RBC AUTO-ENTMCNC: 34.7 G/DL (ref 31.5–35.7)
MCV RBC AUTO: 86.5 FL (ref 79–97)
MONOCYTES # BLD AUTO: 0.39 10*3/MM3 (ref 0.1–0.9)
MONOCYTES NFR BLD AUTO: 6.3 % (ref 5–12)
NEUTROPHILS NFR BLD AUTO: 3.04 10*3/MM3 (ref 1.7–7)
NEUTROPHILS NFR BLD AUTO: 49.2 % (ref 42.7–76)
NRBC BLD AUTO-RTO: 0 /100 WBC (ref 0–0.2)
PLATELET # BLD AUTO: 325 10*3/MM3 (ref 140–450)
PMV BLD AUTO: 10.4 FL (ref 6–12)
POTASSIUM SERPL-SCNC: 4.1 MMOL/L (ref 3.5–5.2)
PROT SERPL-MCNC: 7 G/DL (ref 6–8.5)
RBC # BLD AUTO: 6.09 10*6/MM3 (ref 4.14–5.8)
SODIUM SERPL-SCNC: 140 MMOL/L (ref 136–145)
TRIGL SERPL-MCNC: 79 MG/DL (ref 0–150)
TSH SERPL DL<=0.05 MIU/L-ACNC: 4.08 UIU/ML (ref 0.27–4.2)
VIT B12 BLD-MCNC: 340 PG/ML (ref 211–946)
VLDLC SERPL-MCNC: 14 MG/DL (ref 5–40)
WBC NRBC COR # BLD AUTO: 6.18 10*3/MM3 (ref 3.4–10.8)

## 2024-06-25 PROCEDURE — 82607 VITAMIN B-12: CPT

## 2024-06-25 PROCEDURE — 83036 HEMOGLOBIN GLYCOSYLATED A1C: CPT

## 2024-06-25 PROCEDURE — 36415 COLL VENOUS BLD VENIPUNCTURE: CPT

## 2024-06-25 PROCEDURE — 80061 LIPID PANEL: CPT

## 2024-06-25 PROCEDURE — 84402 ASSAY OF FREE TESTOSTERONE: CPT

## 2024-06-25 PROCEDURE — 82652 VIT D 1 25-DIHYDROXY: CPT

## 2024-06-25 PROCEDURE — 84403 ASSAY OF TOTAL TESTOSTERONE: CPT

## 2024-06-25 PROCEDURE — 99214 OFFICE O/P EST MOD 30 MIN: CPT | Performed by: STUDENT IN AN ORGANIZED HEALTH CARE EDUCATION/TRAINING PROGRAM

## 2024-06-25 PROCEDURE — 80050 GENERAL HEALTH PANEL: CPT

## 2024-06-25 PROCEDURE — 83540 ASSAY OF IRON: CPT

## 2024-06-25 NOTE — PROGRESS NOTES
"Chief Complaint  Fatigue and Shoulder Pain (Left shoulder pain)    Subjective         George Hunter is a 43 y.o. male who presents to [facility]    43 years old comes to the clinic today for an acute visit.    Complaining of left upper extremity numbness and tingling which has been going on for sometimes and worsening.  Patient does have chronic shoulder arthritis and pain but that has not changed recently.  Patient does not report any significant pain or weakness but does report numbness which is kind of more frequent since last few months.    Patient is also reporting few weeks active symptoms of fatigue and would like to do some blood work including B12 and testosterone.    Reports no lifestyle changes or any acute changes since last PCP visit.    12+ review of systems are unremarkable otherwise    Objective   Vital Signs:   Vitals:    06/25/24 1512   BP: 102/70   Pulse: 90   Resp: 16   Temp: 96.3 °F (35.7 °C)   SpO2: 96%   Weight: 101 kg (223 lb)   Height: 185.4 cm (73\")      Body mass index is 29.42 kg/m².   Wt Readings from Last 3 Encounters:   06/25/24 101 kg (223 lb)   06/05/24 101 kg (223 lb 12.3 oz)   06/05/24 102 kg (225 lb 8 oz)      BP Readings from Last 3 Encounters:   06/25/24 102/70   06/05/24 131/90   06/05/24 122/80        Patient Care Team:  Jak Colindres APRN as PCP - General (Nurse Practitioner)  Indu Lloyd APRN as Nurse Practitioner (Endocrinology)  Jayy Buitrago DPM as Consulting Physician (Podiatry)     Physical Exam  Vitals reviewed.   Constitutional:       Appearance: Normal appearance. He is well-developed.   HENT:      Head: Normocephalic and atraumatic.      Right Ear: External ear normal.      Left Ear: External ear normal.      Mouth/Throat:      Pharynx: No oropharyngeal exudate.   Eyes:      Conjunctiva/sclera: Conjunctivae normal.      Pupils: Pupils are equal, round, and reactive to light.   Cardiovascular:      Rate and " Rhythm: Normal rate and regular rhythm.      Heart sounds: No murmur heard.     No friction rub. No gallop.   Pulmonary:      Effort: Pulmonary effort is normal.      Breath sounds: Normal breath sounds. No wheezing or rhonchi.   Abdominal:      General: Bowel sounds are normal. There is no distension.      Palpations: Abdomen is soft.      Tenderness: There is no abdominal tenderness.   Skin:     General: Skin is warm and dry.   Neurological:      Mental Status: He is alert and oriented to person, place, and time.      Cranial Nerves: No cranial nerve deficit.   Psychiatric:         Mood and Affect: Mood and affect normal.         Behavior: Behavior normal.         Thought Content: Thought content normal.         Judgment: Judgment normal.                            Assessment and Plan   Diagnoses and all orders for this visit:    1. Numbness and tingling of left upper extremity (Primary)  Comments:  EMG study ordered, patient to follow-up with PCP if continues with symptoms or no improvement.  Orders:  -     EMG & Nerve Conduction Test; Future  -     CBC and Differential; Future  -     Comprehensive metabolic panel; Future  -     Iron; Future  -     Testosterone, free, total; Future  -     Vitamin B12; Future  -     Vitamin D 1,25 dihydroxy; Future  -     TSH Rfx On Abnormal To Free T4    2. Other fatigue  Comments:  Will get some blood work as requested by patient but this will require further assessment and evaluation if all the blood work are normal and symptoms persist  Orders:  -     CBC and Differential; Future  -     Comprehensive metabolic panel; Future  -     Iron; Future  -     Testosterone, free, total; Future  -     Vitamin B12; Future  -     Vitamin D 1,25 dihydroxy; Future  -     TSH Rfx On Abnormal To Free T4          Tobacco Use: Medium Risk (6/25/2024)    Patient History     Smoking Tobacco Use: Former     Smokeless Tobacco Use: Never     Passive Exposure: Not on file            Follow Up   Return  if symptoms worsen or fail to improve.  Patient was given instructions and counseling regarding his condition or for health maintenance advice. Please see specific information pulled into the AVS if appropriate.

## 2024-06-30 LAB
TESTOST FREE SERPL-MCNC: 6.7 PG/ML (ref 6.8–21.5)
TESTOST SERPL-MCNC: 462 NG/DL (ref 264–916)

## 2024-07-01 LAB — 1,25(OH)2D SERPL-MCNC: 25 PG/ML (ref 24.8–81.5)

## 2024-07-05 RX ORDER — OMEPRAZOLE 40 MG/1
CAPSULE, DELAYED RELEASE ORAL
Qty: 90 CAPSULE | Refills: 0 | Status: SHIPPED | OUTPATIENT
Start: 2024-07-05

## 2024-07-11 ENCOUNTER — APPOINTMENT (OUTPATIENT)
Dept: DIABETES SERVICES | Facility: HOSPITAL | Age: 44
End: 2024-07-11
Payer: COMMERCIAL

## 2024-07-11 ENCOUNTER — HOSPITAL ENCOUNTER (OUTPATIENT)
Facility: HOSPITAL | Age: 44
Discharge: HOME | End: 2024-07-11
Admitting: STUDENT IN AN ORGANIZED HEALTH CARE EDUCATION/TRAINING PROGRAM
Payer: COMMERCIAL

## 2024-07-11 DIAGNOSIS — R20.2 NUMBNESS AND TINGLING OF LEFT UPPER EXTREMITY: ICD-10-CM

## 2024-07-11 DIAGNOSIS — R20.0 NUMBNESS AND TINGLING OF LEFT UPPER EXTREMITY: ICD-10-CM

## 2024-07-11 PROCEDURE — 95909 NRV CNDJ TST 5-6 STUDIES: CPT

## 2024-07-11 PROCEDURE — 95886 MUSC TEST DONE W/N TEST COMP: CPT

## 2024-07-23 ENCOUNTER — OFFICE VISIT (OUTPATIENT)
Dept: FAMILY MEDICINE CLINIC | Facility: CLINIC | Age: 44
End: 2024-07-23
Payer: COMMERCIAL

## 2024-07-23 VITALS
BODY MASS INDEX: 30.3 KG/M2 | DIASTOLIC BLOOD PRESSURE: 80 MMHG | HEART RATE: 69 BPM | TEMPERATURE: 98.3 F | WEIGHT: 228.6 LBS | SYSTOLIC BLOOD PRESSURE: 130 MMHG | HEIGHT: 73 IN | OXYGEN SATURATION: 94 %

## 2024-07-23 DIAGNOSIS — R09.81 CONGESTION OF NASAL SINUS: ICD-10-CM

## 2024-07-23 DIAGNOSIS — J06.9 UPPER RESPIRATORY TRACT INFECTION, UNSPECIFIED TYPE: ICD-10-CM

## 2024-07-23 DIAGNOSIS — R05.1 ACUTE COUGH: Primary | ICD-10-CM

## 2024-07-23 LAB
EXPIRATION DATE: NORMAL
EXPIRATION DATE: NORMAL
FLUAV AG UPPER RESP QL IA.RAPID: NOT DETECTED
FLUBV AG UPPER RESP QL IA.RAPID: NOT DETECTED
INTERNAL CONTROL: NORMAL
INTERNAL CONTROL: NORMAL
Lab: 7892
Lab: NORMAL
SARS-COV-2 AG UPPER RESP QL IA.RAPID: NOT DETECTED
SARS-COV-2 AG UPPER RESP QL IA.RAPID: NOT DETECTED

## 2024-07-23 PROCEDURE — 99214 OFFICE O/P EST MOD 30 MIN: CPT | Performed by: NURSE PRACTITIONER

## 2024-07-23 PROCEDURE — 87428 SARSCOV & INF VIR A&B AG IA: CPT | Performed by: NURSE PRACTITIONER

## 2024-07-23 RX ORDER — AZITHROMYCIN 250 MG/1
TABLET, FILM COATED ORAL
Qty: 6 TABLET | Refills: 0 | Status: SHIPPED | OUTPATIENT
Start: 2024-07-23

## 2024-07-23 RX ORDER — DEXTROMETHORPHAN HYDROBROMIDE AND PROMETHAZINE HYDROCHLORIDE 15; 6.25 MG/5ML; MG/5ML
5 SYRUP ORAL 4 TIMES DAILY PRN
Qty: 118 ML | Refills: 0 | Status: SHIPPED | OUTPATIENT
Start: 2024-07-23

## 2024-07-23 NOTE — PROGRESS NOTES
"Chief Complaint  URI and Vomiting    Subjective         George Hunter presents to [facility]  Patient presents to the office today with complaints of cough congestion and drainage.  Patient states that his son had been sick recently.  He states that yesterday the symptoms started and progressively become worse.  He does state that he has been having chills.  He denies any fevers.  He states that he is up all night coughing.  He denies any other concerns or complaints at this time.    URI   Associated symptoms include vomiting.   Vomiting   Associated symptoms include URI.        Objective     Vitals:    07/23/24 1331   BP: 130/80   BP Location: Left arm   Patient Position: Sitting   Cuff Size: Adult   Pulse: 69   Temp: 98.3 °F (36.8 °C)   SpO2: 94%   Weight: 104 kg (228 lb 9.6 oz)   Height: 185.4 cm (73\")      Body mass index is 30.16 kg/m².             Physical Exam  Vitals reviewed.   Constitutional:       Appearance: Normal appearance.   HENT:      Right Ear: Tympanic membrane, ear canal and external ear normal.      Left Ear: Tympanic membrane, ear canal and external ear normal.      Nose: Congestion and rhinorrhea present.   Cardiovascular:      Rate and Rhythm: Normal rate and regular rhythm.      Pulses: Normal pulses.      Heart sounds: Normal heart sounds, S1 normal and S2 normal. No murmur heard.  Pulmonary:      Effort: Pulmonary effort is normal. No respiratory distress.      Breath sounds: Normal breath sounds.   Skin:     General: Skin is warm and dry.   Neurological:      Mental Status: He is alert and oriented to person, place, and time.   Psychiatric:         Attention and Perception: Attention normal.         Mood and Affect: Mood normal.         Behavior: Behavior normal.          Result Review :   The following data was reviewed by: MALCOM Farias on 07/23/2024:      Procedures    Assessment and Plan   Diagnoses and all orders for this visit:    1. Acute cough " (Primary)  -     POCT SARS-CoV-2 Antigen SLIME  -     POCT SARS-CoV-2 + Flu Antigen SLMIE    2. Congestion of nasal sinus  -     POCT SARS-CoV-2 Antigen SLIME  -     POCT SARS-CoV-2 + Flu Antigen SLIME    3. Upper respiratory tract infection, unspecified type  -     azithromycin (Zithromax Z-Chas) 250 MG tablet; Take 2 tablets by mouth on day 1, then 1 tablet daily on days 2-5  Dispense: 6 tablet; Refill: 0  -     promethazine-dextromethorphan (PROMETHAZINE-DM) 6.25-15 MG/5ML syrup; Take 5 mL by mouth 4 (Four) Times a Day As Needed for Cough.  Dispense: 118 mL; Refill: 0          Follow Up   Return if symptoms worsen or fail to improve.  Patient was given instructions and counseling regarding his condition or for health maintenance advice. Please see specific information pulled into the AVS if appropriate.

## 2024-07-23 NOTE — LETTER
July 23, 2024     Patient: George Hunter   YOB: 1980   Date of Visit: 7/23/2024       To Whom It May Concern:    It is my medical opinion that George Hunter may return to work on 07/24/2024.         Sincerely,        MALCOM Farias

## 2024-07-31 ENCOUNTER — APPOINTMENT (OUTPATIENT)
Dept: GENERAL RADIOLOGY | Facility: HOSPITAL | Age: 44
End: 2024-07-31
Payer: COMMERCIAL

## 2024-07-31 ENCOUNTER — HOSPITAL ENCOUNTER (EMERGENCY)
Facility: HOSPITAL | Age: 44
Discharge: HOME | End: 2024-07-31
Attending: EMERGENCY MEDICINE
Payer: COMMERCIAL

## 2024-07-31 VITALS
BODY MASS INDEX: 30.39 KG/M2 | HEIGHT: 73 IN | WEIGHT: 229.28 LBS | HEART RATE: 96 BPM | OXYGEN SATURATION: 96 % | TEMPERATURE: 98.8 F | RESPIRATION RATE: 16 BRPM | DIASTOLIC BLOOD PRESSURE: 95 MMHG | SYSTOLIC BLOOD PRESSURE: 135 MMHG

## 2024-07-31 DIAGNOSIS — J18.9 PNEUMONIA OF LEFT LOWER LOBE DUE TO INFECTIOUS ORGANISM: Primary | ICD-10-CM

## 2024-07-31 LAB
FLUAV SUBTYP SPEC NAA+PROBE: NOT DETECTED
FLUBV RNA ISLT QL NAA+PROBE: NOT DETECTED
RSV RNA NPH QL NAA+NON-PROBE: NOT DETECTED
S PYO AG THROAT QL: NEGATIVE
SARS-COV-2 RNA RESP QL NAA+PROBE: NOT DETECTED

## 2024-07-31 PROCEDURE — 87637 SARSCOV2&INF A&B&RSV AMP PRB: CPT | Performed by: EMERGENCY MEDICINE

## 2024-07-31 PROCEDURE — 71045 X-RAY EXAM CHEST 1 VIEW: CPT

## 2024-07-31 PROCEDURE — 87081 CULTURE SCREEN ONLY: CPT | Performed by: EMERGENCY MEDICINE

## 2024-07-31 PROCEDURE — 87880 STREP A ASSAY W/OPTIC: CPT | Performed by: EMERGENCY MEDICINE

## 2024-07-31 PROCEDURE — 99283 EMERGENCY DEPT VISIT LOW MDM: CPT

## 2024-07-31 RX ORDER — BENZONATATE 200 MG/1
200 CAPSULE ORAL 3 TIMES DAILY PRN
Qty: 20 CAPSULE | Refills: 0 | Status: SHIPPED | OUTPATIENT
Start: 2024-07-31

## 2024-07-31 RX ORDER — PREDNISONE 20 MG/1
60 TABLET ORAL DAILY
Qty: 15 TABLET | Refills: 0 | Status: SHIPPED | OUTPATIENT
Start: 2024-07-31 | End: 2024-08-05

## 2024-07-31 RX ORDER — DOXYCYCLINE 100 MG/1
100 CAPSULE ORAL 2 TIMES DAILY
Qty: 20 CAPSULE | Refills: 0 | Status: SHIPPED | OUTPATIENT
Start: 2024-07-31

## 2024-07-31 RX ORDER — DOXYCYCLINE 100 MG/1
100 CAPSULE ORAL ONCE
Status: COMPLETED | OUTPATIENT
Start: 2024-07-31 | End: 2024-07-31

## 2024-07-31 RX ADMIN — DOXYCYCLINE 100 MG: 100 CAPSULE ORAL at 20:35

## 2024-07-31 NOTE — ED PROVIDER NOTES
Time: 7:06 PM EDT  Date of encounter:  7/31/2024  Independent Historian/Clinical History and Information was obtained by:   Patient    History is limited by: N/A    Chief Complaint: Flu symptoms      History of Present Illness:  Patient is a 44 y.o. year old male who presents to the emergency department for evaluation of flulike symptoms.  Patient had a small cough for several weeks but nothing very major.  Over the past 2 days has developed severe body aches with subjective fever and persistent cough.  Cough is nonproductive.  No wheezing or shortness of breath.  No palpitations or leg swelling.    HPI    Patient Care Team  Primary Care Provider: Jak Colindres APRN    Past Medical History:     Allergies   Allergen Reactions    Penicillins Anaphylaxis     Childhood allergy      Codeine Itching     Past Medical History:   Diagnosis Date    Allergic     Allergies     Anxiety     Back injury     Broken bones     Deafness     Diabetes     Diabetes mellitus type I     Foot pain, bilateral 2018    Fracture     Hammertoe     Head injury     Hernia cerebri     Hyperlipidemia     Hypothyroidism     Neuropathy in diabetes 2009    Plantar fascial fibromatosis of both feet 2018    Toe pain, right      Past Surgical History:   Procedure Laterality Date    HERNIA REPAIR      TOENAIL EXCISION  2021     Family History   Family history unknown: Yes       Home Medications:  Prior to Admission medications    Medication Sig Start Date End Date Taking? Authorizing Provider   azithromycin (Zithromax Z-Chas) 250 MG tablet Take 2 tablets by mouth on day 1, then 1 tablet daily on days 2-5 7/23/24   Jak Colindres APRN   cetirizine-pseudoephedrine (ZyrTEC-D) 5-120 MG per 12 hr tablet Take 1 tablet by mouth 2 (Two) Times a Day. 1/15/22   Annia Jackson MD   Continuous Blood Gluc Sensor (FreeStyle Nadine 3 Sensor) misc 1 each by Other route Every 14 (Fourteen) Days. 1/11/24   Indu Lloyd APRN   cyclobenzaprine (FLEXERIL) 10 MG tablet  Take 1 tablet by mouth 2 (Two) Times a Day As Needed for Muscle Spasms. 12/20/23   Jak Colindres APRN   Euthyrox 25 MCG tablet Take 1 tablet by mouth Daily. 7/12/21   Provider, MD Rudolph   insulin degludec (Tresiba FlexTouch) 100 UNIT/ML solution pen-injector injection Inject 44 Units under the skin into the appropriate area as directed Daily for 180 days. 4/11/24 10/8/24  Indu Lloyd APRN   montelukast (Singulair) 10 MG tablet Take 1 tablet by mouth Every Night. 6/5/24   Nia Rodas APRN   NovoLOG FlexPen 100 UNIT/ML solution pen-injector sc pen INJECT 30 UNITS SUBCUTANEOUSLY THREE TIMES DAILY AS DIRECTED WITH MEALS 3/11/24   Indu Lloyd APRN   omeprazole (priLOSEC) 40 MG capsule TAKE 1 CAPSULE BY MOUTH ONCE DAILY BEFORE A MEAL 7/5/24   Jak Colindres APRN   promethazine-dextromethorphan (PROMETHAZINE-DM) 6.25-15 MG/5ML syrup Take 5 mL by mouth 4 (Four) Times a Day As Needed for Cough. 7/23/24   Jak Colindres APRN   simvastatin (ZOCOR) 10 MG tablet Take 1 tablet by mouth Every Night. 11/11/21   Indu Lloyd APRN        Social History:   Social History     Tobacco Use    Smoking status: Former     Average packs/day: 0.5 packs/day for 14.3 years (7.1 ttl pk-yrs)     Types: Cigarettes, Cigars     Start date: 7/23/1997     Passive exposure: Past    Smokeless tobacco: Never    Tobacco comments:     Quit 2011   Vaping Use    Vaping status: Never Used   Substance Use Topics    Alcohol use: Not Currently    Drug use: Never         Review of Systems:  Review of Systems   Constitutional:  Positive for fever (Subjective).   HENT:  Negative for ear pain, sinus pressure, sinus pain, sneezing and sore throat.    Respiratory:  Positive for cough. Negative for shortness of breath and wheezing.    Gastrointestinal:  Negative for abdominal pain, diarrhea, nausea and vomiting.   Genitourinary:  Negative for flank pain.   Musculoskeletal:  Negative for back pain.   Skin:  Negative for rash.  "  Neurological: Negative.    Hematological: Negative.    Psychiatric/Behavioral: Negative.     All other systems reviewed and are negative.       Physical Exam:  /95 (BP Location: Right arm, Patient Position: Sitting)   Pulse 96   Temp 98.8 °F (37.1 °C) (Oral)   Resp 16   Ht 185.4 cm (73\")   Wt 104 kg (229 lb 4.5 oz)   SpO2 96%   BMI 30.25 kg/m²     Physical Exam  Vitals and nursing note reviewed.   Constitutional:       Appearance: Normal appearance.   HENT:      Head: Atraumatic.      Nose: Nose normal.      Mouth/Throat:      Mouth: Mucous membranes are moist.   Eyes:      Conjunctiva/sclera: Conjunctivae normal.   Cardiovascular:      Rate and Rhythm: Normal rate and regular rhythm.      Heart sounds: Normal heart sounds.   Pulmonary:      Effort: Pulmonary effort is normal.      Breath sounds: Normal breath sounds.   Abdominal:      Palpations: Abdomen is soft.      Tenderness: There is no abdominal tenderness.   Musculoskeletal:         General: Normal range of motion.      Cervical back: Normal range of motion.      Right lower leg: No edema.      Left lower leg: No edema.   Skin:     General: Skin is warm and dry.      Findings: No rash.   Neurological:      Mental Status: He is alert and oriented to person, place, and time.   Psychiatric:         Mood and Affect: Mood normal.         Behavior: Behavior normal.                Medical Decision Making:      Comorbidities that affect care:    Diabetes, Thyroid Disease    External Notes reviewed:    Previous Clinic Note: Patient seen by PCP back in July on the 23rd for acute cough with sinus congestion was put on Phenergan DM      The following orders were placed and all results were independently analyzed by me:  Orders Placed This Encounter   Procedures    COVID-19, FLU A/B, RSV PCR 1 HR TAT - Swab, Nasopharynx    Rapid Strep A Screen - Swab, Throat    Beta Strep Culture, Throat - Swab, Throat    XR Chest 1 View       Medications Given in the " Emergency Department:  Medications   doxycycline (MONODOX) capsule 100 mg (100 mg Oral Given 7/31/24 2035)        ED Course:    ED Course as of 07/31/24 2040 Wed Jul 31, 2024 2016 XR Chest 1 View  Left lung pneumonia [DS]      ED Course User Index  [DS] Maria Luisa Cameron APRN       Labs:    Lab Results (last 24 hours)       Procedure Component Value Units Date/Time    COVID-19, FLU A/B, RSV PCR 1 HR TAT - Swab, Nasopharynx [[id number]]  (Normal) Collected: 07/31/24 1932    Specimen: Swab from Nasopharynx Updated: 07/31/24 2013     COVID19 Not Detected     Influenza A PCR Not Detected     Influenza B PCR Not Detected     RSV, PCR Not Detected    Narrative:      Fact sheet for providers: https://www.fda.gov/media/876869/download    Fact sheet for patients: https://www.fda.gov/media/628536/download    Test performed by PCR.    Rapid Strep A Screen - Swab, Throat [[id number]]  (Normal) Collected: 07/31/24 1932    Specimen: Swab from Throat Updated: 07/31/24 2002     Strep A Ag Negative    Beta Strep Culture, Throat - Swab, Throat [[id number]] Collected: 07/31/24 1932    Specimen: Swab from Throat Updated: 07/31/24 2002             Imaging:    XR Chest 1 View    Result Date: 7/31/2024  XR CHEST 1 VW Date of Exam: 7/31/2024 7:33 PM EDT Indication: fever/ cough Comparison: 6/1/2022 Findings: The heart and pulmonary vasculature are within normal limits. No evidence of pneumothorax or pleural effusion. New mild patchy airspace opacity in the left lung base.     Impression: New mild patchy airspace opacity in the left lung base possibly atelectasis or pneumonia. Electronically Signed: Kishore Delacruz MD  7/31/2024 8:13 PM EDT  Workstation ID: [id number]       Differential Diagnosis and Discussion:    Cough: Differential diagnosis includes but is not limited to pneumonia, acute bronchitis, upper respiratory infection, ACE inhibitor use, allergic reaction, epiglottitis, seasonal allergies, chemical irritants, exercise-induced asthma,  viral syndrome.    All labs were reviewed and interpreted by me.  All X-rays impressions were independently interpreted by me.    MDM  Number of Diagnoses or Management Options  Pneumonia of left lower lobe due to infectious organism  Diagnosis management comments: The patient presented with a productive cough. The patient is well-appearing and in no respiratory distress. The patient has a normal mental status and is neurologically intact. The patient appears well and is able to tolerate food for fluid by mouth and there's no significant dehydration. There is no respiratory distress and no signs of systemic toxicity. The history, exam, diagnostic testing and current condition do not demonstrate an infectious process such as meningitis, retropharyngeal abscess, epiglottitis, sepsis or other serious bacterial infection requiring further testing, treatment, consultation, or admission at this time. The patient has no additional oxygen requirement. The patient has a chest x-ray that is suggestive of pneumonia. The vital signs have been stable. The patient's condition is stable and appropriate for discharge. The patient was advised to return for worsening fever, respiratory distress, intractable vomiting, weakness, shortness of breath, chest pain, or altered mental status. The patient will pursue further outpatient evaluation with the primary care physician. The patient has expressed a clear and thorough understanding and agreed to follow-up as instructed.       Amount and/or Complexity of Data Reviewed  Clinical lab tests: reviewed and ordered  Tests in the radiology section of CPT®: reviewed and ordered  Tests in the medicine section of CPT®: ordered and reviewed    Risk of Complications, Morbidity, and/or Mortality  Presenting problems: low  Diagnostic procedures: low  Management options: low    Patient Progress  Patient progress: stable         Patient Care Considerations:    LABS: I considered ordering labs, however  patient does not appear septic      Consultants/Shared Management Plan:    None    Social Determinants of Health:    Patient is independent, reliable, and has access to care.       Disposition and Care Coordination:    Discharged: I considered escalation of care by admitting this patient to the hospital, however patient is not tachypneic tachycardic or hypoxic    I have explained the patient´s condition, diagnoses and treatment plan based on the information available to me at this time. I have answered questions and addressed any concerns. The patient has a good  understanding of the patient´s diagnosis, condition, and treatment plan as can be expected at this point. The vital signs have been stable. The patient´s condition is stable and appropriate for discharge from the emergency department.      The patient will pursue further outpatient evaluation with the primary care physician or other designated or consulting physician as outlined in the discharge instructions. They are agreeable to this plan of care and follow-up instructions have been explained in detail. The patient has received these instructions in written format and has expressed an understanding of the discharge instructions. The patient is aware that any significant change in condition or worsening of symptoms should prompt an immediate return to this or the closest emergency department or call to 911.  I have explained discharge medications and the need for follow up with the patient/caretakers. This was also printed in the discharge instructions. Patient was discharged with the following medications and follow up:      Medication List        New Prescriptions      benzonatate 200 MG capsule  Commonly known as: TESSALON  Take 1 capsule by mouth 3 (Three) Times a Day As Needed for Cough.     doxycycline 100 MG capsule  Commonly known as: MONODOX  Take 1 capsule by mouth 2 (Two) Times a Day.     predniSONE 20 MG tablet  Commonly known as:  DELTASONE  Take 3 tablets by mouth Daily for 5 days.               Where to Get Your Medications        These medications were sent to [facility] [address] - [place], [place] - [address] [facility] - [phone]  - [phone]  [address] [place] [address]      Phone: [phone]   benzonatate 200 MG capsule  doxycycline 100 MG capsule  predniSONE 20 MG tablet      Jak Colindres, APRN  [address] [facility] [address]  [facility] [address] [phone]    On 8/5/2024         Final diagnoses:   Pneumonia of left lower lobe due to infectious organism        ED Disposition       ED Disposition   Discharge    Condition   Stable    Comment   --               This medical record created using voice recognition software.             Maria Luisa Cameron APRN  07/31/24 2040

## 2024-07-31 NOTE — Clinical Note
[facility] EMERGENCY ROOM  [address] [place] [address]  Phone: [phone]  Fax: [phone]    George Hunter was seen and treated in our emergency department on 7/31/2024.  He may return to work on 08/05/2024.         Thank you for choosing [facility].    Uzair Bowser, DO

## 2024-08-01 NOTE — DISCHARGE INSTRUCTIONS
Drink plenty fluids.    Alternate Tylenol Motrin for any fever greater than 100.4.    Continue home medications including previous Phenergan DM prescribed by your doctor for cough.    Take medications as prescribed

## 2024-08-02 LAB — BACTERIA SPEC AEROBE CULT: NORMAL

## 2024-08-07 ENCOUNTER — OFFICE VISIT (OUTPATIENT)
Dept: FAMILY MEDICINE CLINIC | Facility: CLINIC | Age: 44
End: 2024-08-07
Payer: COMMERCIAL

## 2024-08-07 VITALS
SYSTOLIC BLOOD PRESSURE: 136 MMHG | HEART RATE: 98 BPM | BODY MASS INDEX: 29.55 KG/M2 | HEIGHT: 73 IN | OXYGEN SATURATION: 93 % | DIASTOLIC BLOOD PRESSURE: 88 MMHG | TEMPERATURE: 96.3 F | WEIGHT: 223 LBS

## 2024-08-07 DIAGNOSIS — N50.811 PAIN IN RIGHT TESTICLE: Primary | ICD-10-CM

## 2024-08-07 PROCEDURE — 99213 OFFICE O/P EST LOW 20 MIN: CPT | Performed by: NURSE PRACTITIONER

## 2024-08-07 NOTE — PROGRESS NOTES
"Chief Complaint  Groin Pain    Subjective         George Hunter presents to [facility]  Presents to the office today with complaints of right groin pain, right testicle pain.  He states on Sunday he was pulling on a door that was jammed.  He states the door handle broke causing him to fall back.  He states that he did land on his tailbone.  Patient does state that he was seen in urgent care for this and x-rays were completed.  These were unremarkable.  He states that on Monday the following day he began having swelling of the right testicle and tenderness to the right testicle.  He rates it a 7 out of 10 at this time.  He denies any pain with urination.    Groin Pain         Objective     Vitals:    08/07/24 1414   BP: 136/88   BP Location: Right arm   Patient Position: Sitting   Cuff Size: Adult   Pulse: 98   Temp: 96.3 °F (35.7 °C)   TempSrc: Temporal   SpO2: 93%   Weight: 101 kg (223 lb)   Height: 185.4 cm (73\")      Body mass index is 29.42 kg/m².             Physical Exam  Vitals reviewed.   Constitutional:       Appearance: Normal appearance.   Cardiovascular:      Rate and Rhythm: Normal rate and regular rhythm.      Pulses: Normal pulses.      Heart sounds: Normal heart sounds, S1 normal and S2 normal. No murmur heard.  Pulmonary:      Effort: Pulmonary effort is normal. No respiratory distress.      Breath sounds: Normal breath sounds.   Genitourinary:     Testes:         Right: Tenderness and swelling present.   Skin:     General: Skin is warm and dry.   Neurological:      Mental Status: He is alert and oriented to person, place, and time.   Psychiatric:         Attention and Perception: Attention normal.         Mood and Affect: Mood normal.         Behavior: Behavior normal.          Result Review :   The following data was reviewed by: MACLOM Farias on 08/07/2024:      Procedures    Assessment and Plan   Diagnoses and all orders for this visit:    1. Pain in right " testicle (Primary)  -     US Scrotum & Testicles; Future    I did discuss getting an ultrasound of the right testicle to further evaluate.  Patient verbalized understanding.       Follow Up   Return if symptoms worsen or fail to improve.  Patient was given instructions and counseling regarding his condition or for health maintenance advice. Please see specific information pulled into the AVS if appropriate.

## 2024-08-13 DIAGNOSIS — N45.1 EPIDIDYMITIS: Primary | ICD-10-CM

## 2024-08-13 RX ORDER — LEVOFLOXACIN 500 MG/1
500 TABLET, FILM COATED ORAL DAILY
Qty: 10 TABLET | Refills: 0 | Status: SHIPPED | OUTPATIENT
Start: 2024-08-13 | End: 2024-08-23

## 2024-08-30 ENCOUNTER — TELEPHONE (OUTPATIENT)
Dept: DIABETES SERVICES | Facility: HOSPITAL | Age: 44
End: 2024-08-30
Payer: COMMERCIAL

## 2024-08-30 NOTE — TELEPHONE ENCOUNTER
Provider: SUZANNE BECKER       Caller: SUZY CHRISTOPHER    Relationship to Patient: SELF    Reason for Call: PATIENT WORKS IN [place], [place] AND WOULD LIKE TO KNOW IF HE COULD GET A REFERRAL TO  A ENDO IN [place]. PLEASE CALL PATIENT ONCE REFERRAL HAS BEEN SENT

## 2024-09-20 ENCOUNTER — DOCUMENTATION (OUTPATIENT)
Dept: PHYSICAL THERAPY | Facility: CLINIC | Age: 44
End: 2024-09-20
Payer: COMMERCIAL

## 2024-09-27 ENCOUNTER — LAB (OUTPATIENT)
Dept: LAB | Facility: HOSPITAL | Age: 44
End: 2024-09-27
Payer: COMMERCIAL

## 2024-09-27 ENCOUNTER — OFFICE VISIT (OUTPATIENT)
Dept: FAMILY MEDICINE CLINIC | Facility: CLINIC | Age: 44
End: 2024-09-27
Payer: COMMERCIAL

## 2024-09-27 ENCOUNTER — HOSPITAL ENCOUNTER (OUTPATIENT)
Dept: GENERAL RADIOLOGY | Facility: HOSPITAL | Age: 44
Discharge: HOME | End: 2024-09-27
Payer: COMMERCIAL

## 2024-09-27 DIAGNOSIS — J30.9 ALLERGIC RHINITIS, UNSPECIFIED SEASONALITY, UNSPECIFIED TRIGGER: ICD-10-CM

## 2024-09-27 DIAGNOSIS — Z23 NEED FOR INFLUENZA VACCINATION: Primary | ICD-10-CM

## 2024-09-27 DIAGNOSIS — M25.511 ACUTE PAIN OF RIGHT SHOULDER: ICD-10-CM

## 2024-09-27 DIAGNOSIS — E10.65 TYPE 1 DIABETES MELLITUS WITH HYPERGLYCEMIA: ICD-10-CM

## 2024-09-27 DIAGNOSIS — D75.1 POLYCYTHEMIA: ICD-10-CM

## 2024-09-27 LAB
DEPRECATED RDW RBC AUTO: 40.1 FL (ref 37–54)
ERYTHROCYTE [DISTWIDTH] IN BLOOD BY AUTOMATED COUNT: 12.4 % (ref 12.3–15.4)
HCT VFR BLD AUTO: 46.7 % (ref 37.5–51)
HGB BLD-MCNC: 15.8 G/DL (ref 13–17.7)
MCH RBC QN AUTO: 29.9 PG (ref 26.6–33)
MCHC RBC AUTO-ENTMCNC: 33.8 G/DL (ref 31.5–35.7)
MCV RBC AUTO: 88.3 FL (ref 79–97)
PLATELET # BLD AUTO: 279 10*3/MM3 (ref 140–450)
PMV BLD AUTO: 10.5 FL (ref 6–12)
RBC # BLD AUTO: 5.29 10*6/MM3 (ref 4.14–5.8)
WBC NRBC COR # BLD AUTO: 6.4 10*3/MM3 (ref 3.4–10.8)

## 2024-09-27 PROCEDURE — 36415 COLL VENOUS BLD VENIPUNCTURE: CPT

## 2024-09-27 PROCEDURE — 90471 IMMUNIZATION ADMIN: CPT | Performed by: NURSE PRACTITIONER

## 2024-09-27 PROCEDURE — 85027 COMPLETE CBC AUTOMATED: CPT

## 2024-09-27 PROCEDURE — 99214 OFFICE O/P EST MOD 30 MIN: CPT | Performed by: NURSE PRACTITIONER

## 2024-09-27 PROCEDURE — 73030 X-RAY EXAM OF SHOULDER: CPT

## 2024-09-27 PROCEDURE — 90656 IIV3 VACC NO PRSV 0.5 ML IM: CPT | Performed by: NURSE PRACTITIONER

## 2024-09-27 RX ORDER — CHLORCYCLIZINE HYDROCHLORIDE AND PSEUDOEPHEDRINE HYDROCHLORIDE 25; 60 MG/1; MG/1
1 TABLET ORAL 3 TIMES DAILY PRN
Qty: 42 TABLET | Refills: 0 | Status: SHIPPED | OUTPATIENT
Start: 2024-09-27

## 2024-10-07 RX ORDER — OMEPRAZOLE 40 MG/1
CAPSULE, DELAYED RELEASE ORAL
Qty: 90 CAPSULE | Refills: 0 | Status: SHIPPED | OUTPATIENT
Start: 2024-10-07

## 2024-10-25 ENCOUNTER — OFFICE VISIT (OUTPATIENT)
Dept: PODIATRY | Facility: CLINIC | Age: 44
End: 2024-10-25
Payer: COMMERCIAL

## 2024-10-25 VITALS
WEIGHT: 221 LBS | SYSTOLIC BLOOD PRESSURE: 107 MMHG | OXYGEN SATURATION: 95 % | DIASTOLIC BLOOD PRESSURE: 74 MMHG | BODY MASS INDEX: 29.16 KG/M2 | HEART RATE: 76 BPM | TEMPERATURE: 98.3 F

## 2024-10-25 DIAGNOSIS — E11.9 TYPE 2 DIABETES MELLITUS WITHOUT COMPLICATION, WITH LONG-TERM CURRENT USE OF INSULIN: Primary | ICD-10-CM

## 2024-10-25 DIAGNOSIS — Z79.4 TYPE 2 DIABETES MELLITUS WITHOUT COMPLICATION, WITH LONG-TERM CURRENT USE OF INSULIN: Primary | ICD-10-CM

## 2024-10-25 DIAGNOSIS — E11.8 DM FEET: ICD-10-CM

## 2024-10-25 NOTE — PROGRESS NOTES
[facility] - PODIATRY    Today's Date: 10/25/24    Patient Name: George Hunter  MRN: [id number]  CSN: [id number]  PCP: Jak Colindres APRN, Last PCP Visit: 27 September 2024  Referring Provider: No ref. provider found    SUBJECTIVE     Chief Complaint   Patient presents with    Left Foot - Follow-up, Diabetes    Right Foot - Follow-up, Diabetes          HPI: George Hunter, a 44 y.o.male, presents to clinic for a diabetic foot evaluation.    New, Established, New Problem: Established    Duration:  2000    Onset:  insidious    Nature:  IDDM    Stable, worsening, improving:  stable    Patient controlling diabetes via:  insulin    Patient denies any fevers, chills, nausea, vomiting, shortness of breath, nor any other constitutional signs nor symptoms.    Medical changes:  no changes.    Patient states their most recent blood glucose reading was  unsure.    I have reviewed/confirmed previously documented HPI with no changes.   Past Medical History:   Diagnosis Date    Allergic     Allergies     Anxiety     Back injury     Broken bones     Deafness     Diabetes     Diabetes mellitus type I     Foot pain, bilateral 2018    Fracture     Hammertoe     Head injury     Hernia cerebri     Hyperlipidemia     Hypothyroidism     Neuropathy in diabetes 2009    Plantar fascial fibromatosis of both feet 2018    Toe pain, right      Past Surgical History:   Procedure Laterality Date    COLONOSCOPY  2017    HERNIA REPAIR      TOENAIL EXCISION  2021     Family History   Family history unknown: Yes     Social History     Socioeconomic History    Marital status:     Number of children: 1   Tobacco Use    Smoking status: Former     Average packs/day: 0.5 packs/day for 14.3 years (7.1 ttl pk-yrs)     Types: Cigarettes, Cigars     Start date: 7/23/1997     Passive exposure: Past    Smokeless tobacco: Never    Tobacco comments:     Quit 2011   Vaping Use    Vaping status: Never Used   Substance and Sexual Activity     Alcohol use: Not Currently    Drug use: Never    Sexual activity: Yes     Partners: Female     Allergies   Allergen Reactions    Penicillins Anaphylaxis     Childhood allergy      Codeine Itching     Current Outpatient Medications   Medication Sig Dispense Refill    acetaminophen (TYLENOL) 500 MG tablet Take 2 tablets by mouth 3 (Three) Times a Day As Needed (pain). 50 tablet 0    Continuous Blood Gluc Sensor (FreeStyle Nadine 3 Sensor) misc 1 each by Other route Every 14 (Fourteen) Days. 2 each 5    cyclobenzaprine (FLEXERIL) 10 MG tablet Take 1 tablet by mouth 2 (Two) Times a Day As Needed for Muscle Spasms. 60 tablet 0    Euthyrox 25 MCG tablet Take 1 tablet by mouth Daily.      montelukast (Singulair) 10 MG tablet Take 1 tablet by mouth Every Night. 30 tablet 0    NovoLOG FlexPen 100 UNIT/ML solution pen-injector sc pen INJECT 30 UNITS SUBCUTANEOUSLY THREE TIMES DAILY AS DIRECTED WITH MEALS 15 mL 0    omeprazole (priLOSEC) 40 MG capsule TAKE 1 CAPSULE BY MOUTH ONCE DAILY BEFORE A MEAL 90 capsule 0    simvastatin (ZOCOR) 10 MG tablet Take 1 tablet by mouth Every Night. 30 tablet 3     No current facility-administered medications for this visit.     Review of Systems   Constitutional: Negative.    All other systems reviewed and are negative.      OBJECTIVE     Vitals:    10/25/24 0737   BP: 107/74   Pulse: 76   Temp: 98.3 °F (36.8 °C)   SpO2: 95%         Body mass index is 29.16 kg/m².    Lab Results   Component Value Date    HGBA1C 7.40 (H) 06/25/2024       Lab Results   Component Value Date    GLUCOSE 95 06/25/2024    CALCIUM 10.0 06/25/2024     06/25/2024    K 4.1 06/25/2024    CO2 23.5 06/25/2024     06/25/2024    BUN 14 06/25/2024    CREATININE 0.89 06/25/2024    EGFRIFNONA 94 02/01/2022    BCR 15.7 06/25/2024    ANIONGAP 10.5 06/25/2024       Patient seen in no apparent distress.      PHYSICAL EXAM:     Foot/Ankle Exam    GENERAL  Diabetic foot exam performed    Appearance:  appears stated  age  Orientation:  AAOx3  Affect:  appropriate  Gait:  unimpaired  Assistance:  independent  Right shoe gear: casual shoe  Left shoe gear: casual shoe    VASCULAR     Right Foot Vascularity   Normal vascular exam    Dorsalis pedis:  2+  Posterior tibial:  2+  Skin temperature:  warm  Edema grading:  None  CFT:  < 3 seconds  Pedal hair growth:  Present  Varicosities:  none     Left Foot Vascularity   Normal vascular exam    Dorsalis pedis:  2+  Posterior tibial:  2+  Skin temperature:  warm  Edema grading:  None  CFT:  < 3 seconds  Pedal hair growth:  Present  Varicosities:  none     NEUROLOGIC     Right Foot Neurologic   Normal sensation    Light touch sensation: normal  Vibratory sensation: normal  Hot/Cold sensation: normal  Protective Sensation using [place] Monofilament:   Sites intact: 10  Sites tested: 10     Left Foot Neurologic   Normal sensation    Light touch sensation: normal  Vibratory sensation: normal  Hot/Cold sensation:  normal  Protective Sensation using [place] Monofilament:   Sites intact: 10  Sites tested: 10    MUSCULOSKELETAL     Right Foot Musculoskeletal   Arch:  Pes cavus     Left Foot Musculoskeletal   Arch:  Pes cavus    MUSCLE STRENGTH     Right Foot Muscle Strength   Foot dorsiflexion:  4  Foot plantar flexion:  4  Foot inversion:  4  Foot eversion:  4     Left Foot Muscle Strength   Foot dorsiflexion:  4  Foot plantar flexion:  4  Foot inversion:  4  Foot eversion:  4    RANGE OF MOTION     Right Foot Range of Motion   Foot and ankle ROM within normal limits       Left Foot Range of Motion   Foot and ankle ROM within normal limits      DERMATOLOGIC      Right Foot Dermatologic   Skin  Right foot skin is intact.      Left Foot Dermatologic   Skin  Left foot skin is intact.     Diabetic Foot Exam Performed and Monofilament Test Performed    I have reexamined the patient the results are consistent with the previously documented exam.    ASSESSMENT/PLAN     Diagnoses and  all orders for this visit:    1. Type 2 diabetes mellitus without complication, with long-term current use of insulin (Primary)    2. DM feet      Comprehensive lower extremity examination and evaluation was performed.    Discussed findings and treatment plan including risks, benefits, and treatment options with patient in detail. Patient agreed with treatment plan.    Diabetic foot exam performed and documented this date, compliant with CQM required standards. Detail of findings as noted in physical exam.  Lower extremity Neurologic exam for diabetic patient performed and documented this date, compliant with PQRS required standards. Detail of findings as noted in physical exam.  Advised patient importance of good routine lower extremity hygiene. Advised patient importance of evaluating for intact skin and pain free nail borders.  Advised patient to use mirror to evaluate plantar/ soles of feet for better visualization. Advised patient monitor and phone office to be seen if any cracking to skin, open lesions, painful nail borders or if nails become elongated prior to next visit. Advised patient importance of daily cleansing of lower extremities, followed by good skin cream to maintain normal hydration of skin. Also advised patient importance of close daily monitoring of blood sugar. Advised to regulate diet and medications to maintain control of blood sugar in optimal range. Contact primary care provider if difficulties maintaining blood sugar levels.  Advised Patient of presence of Diabetes Mellitus condition.  Advised Patient risk of progression and worsening or improvement, then return of condition.  Will monitor condition for any change in future. Treat with most appropriate treatment pending status of condition.  Counseled and advised patient extensively on nature and ramifications of diabetes. Standard instructions given to patient for good diabetic foot care and maintenance. Advised importance of careful  monitoring to avoid break down and complications secondary to diabetes. Advised patient importance of strict maintenance of blood sugar control. Advised patient of possible ominous results from neglect of condition, i.e.: amputation/ loss of digits, feet and legs, or even death.  Patient states understands counseling, will monitor closely, continue good hygiene and routine diabetic foot care. Patient will contact office is questions or problems.      An After Visit Summary was printed and given to the patient at discharge, including (if requested) any available informative/educational handouts regarding diagnosis, treatment, or medications. All questions were answered to patient/family satisfaction. Should symptoms fail to improve or worsen they agree to call or return to clinic or to go to the Emergency Department. Discussed the importance of following up with any needed screening tests/labs/specialist appointments and any requested follow-up recommended by me today. Importance of maintaining follow-up discussed and patient accepts that missed appointments can delay diagnosis and potentially lead to worsening of conditions.    Return in about 1 year (around 10/25/2025) for DFE., or sooner if acute issues arise.    I have dictated this note utilizing Dragon Dictation.  Please note that portions of this note were completed with a voice recognition program.  Part of this note may be an electronic transcription/translation of spoken language to printed text using the Dragon Dictation System.      This document has been electronically signed by Jayy Buitrago DPM on October 25, 2024 07:49 EDT

## 2024-10-27 DIAGNOSIS — E10.65 UNCONTROLLED TYPE 1 DIABETES MELLITUS WITH HYPERGLYCEMIA: ICD-10-CM

## 2024-10-28 RX ORDER — INSULIN DEGLUDEC 100 U/ML
INJECTION, SOLUTION SUBCUTANEOUS
Qty: 45 ML | Refills: 0 | OUTPATIENT
Start: 2024-10-28

## 2024-11-16 NOTE — PROGRESS NOTES
"Chief Complaint  Sore Throat and Cough    Subjective          George Hunter presents to [facility]  Sent to the office today with complaints of sore throat, cough and chest congestion.  Patient denies any fevers at this time.  He denies any shortness of breath, nausea vomiting or diarrhea.  He denies any changes in his taste or smell.  Patient does state that he received a Covid swab which was negative.      Objective   Vital Signs:   /70 (BP Location: Right arm, Patient Position: Sitting, Cuff Size: Large Adult)   Pulse 75   Temp 98.3 °F (36.8 °C) (Infrared)   Resp 16   Ht 73 cm (28.74\")   Wt 94.8 kg (209 lb)   SpO2 (!) 89%   .90 kg/m²     Physical Exam  Vitals reviewed.   Constitutional:       Appearance: Normal appearance.   HENT:      Right Ear: Tympanic membrane, ear canal and external ear normal.      Left Ear: Tympanic membrane, ear canal and external ear normal.      Mouth/Throat:      Mouth: Mucous membranes are moist.      Pharynx: Posterior oropharyngeal erythema present.   Cardiovascular:      Rate and Rhythm: Normal rate and regular rhythm.      Heart sounds: Normal heart sounds, S1 normal and S2 normal. No murmur heard.     Pulmonary:      Effort: Pulmonary effort is normal. No respiratory distress.      Breath sounds: Normal breath sounds.   Skin:     General: Skin is warm and dry.   Neurological:      Mental Status: He is alert and oriented to person, place, and time.   Psychiatric:         Attention and Perception: Attention normal.         Mood and Affect: Mood normal.         Behavior: Behavior normal.        Result Review :                Assessment and Plan    Diagnoses and all orders for this visit:    1. Upper respiratory tract infection, unspecified type (Primary)  -     azithromycin (Zithromax Z-Chas) 250 MG tablet; Take 2 tablets by mouth on day 1, then 1 tablet daily on days 2-5  Dispense: 6 tablet; Refill: 0        Follow Up   Return if " symptoms worsen or fail to improve.  Patient was given instructions and counseling regarding his condition or for health maintenance advice. Please see specific information pulled into the AVS if appropriate.        [Hyperlipidemia] : hyperlipidemia [Hypertension] : hypertension [Other: ____] : [unfilled]

## 2024-11-19 DIAGNOSIS — E10.65 TYPE 1 DIABETES MELLITUS WITH HYPERGLYCEMIA: Primary | ICD-10-CM

## 2024-11-19 DIAGNOSIS — E10.65 UNCONTROLLED TYPE 1 DIABETES MELLITUS WITH HYPERGLYCEMIA: ICD-10-CM

## 2024-11-19 RX ORDER — INSULIN ASPART 100 [IU]/ML
30 INJECTION, SOLUTION INTRAVENOUS; SUBCUTANEOUS
Qty: 15 ML | Refills: 0 | Status: SHIPPED | OUTPATIENT
Start: 2024-11-19

## 2024-11-19 RX ORDER — INSULIN DEGLUDEC 200 U/ML
44 INJECTION, SOLUTION SUBCUTANEOUS DAILY
Qty: 3 ML | Refills: 1 | Status: SHIPPED | OUTPATIENT
Start: 2024-11-19

## 2024-11-19 RX ORDER — BLOOD-GLUCOSE SENSOR
1 EACH MISCELLANEOUS
Qty: 2 EACH | Refills: 5 | Status: SHIPPED | OUTPATIENT
Start: 2024-11-19

## 2024-11-20 DIAGNOSIS — E10.65 TYPE 1 DIABETES MELLITUS WITH HYPERGLYCEMIA: Primary | ICD-10-CM

## 2024-11-20 RX ORDER — BLOOD-GLUCOSE SENSOR
EACH MISCELLANEOUS
Qty: 6 EACH | Refills: 2 | Status: SHIPPED | OUTPATIENT
Start: 2024-11-20

## 2024-11-20 NOTE — TELEPHONE ENCOUNTER
[place] [address] [place] [address] [address] [facility] [place] - [phone]  - [phone]        Pharmacy representative name: IVÁN    Pharmacy representative phone number: [phone]     What medication are you calling in regards to: FREESTYLE REJI 3 SENSORS    What question does the pharmacy have: CALLER STATES THAT THE REJI 3 SENSORS HAVE BEEN DISCONTINUED. CALLER NEEDING PRESCRIPTION SWITCHED TO FREESTYLE REJI 3 PLUS    Who is the provider that prescribed the medication: CLOVER SILVA

## 2024-11-25 ENCOUNTER — TELEPHONE (OUTPATIENT)
Dept: FAMILY MEDICINE CLINIC | Facility: CLINIC | Age: 44
End: 2024-11-25
Payer: COMMERCIAL

## 2024-11-25 NOTE — TELEPHONE ENCOUNTER
[place] [address] [place] [address] [address] [facility] - [phone]  - [phone]    Relationship to patient: Pharmacy    Best call back number: [phone]    PHARMACIST IS REQUESTING A CALL BACK REGARDING DIRECTIONS ON MEIDACTION Continuous Glucose Sensor (FreeStyle Nadine 3 Plus Sensor)

## 2024-12-10 ENCOUNTER — OFFICE VISIT (OUTPATIENT)
Dept: FAMILY MEDICINE CLINIC | Facility: CLINIC | Age: 44
End: 2024-12-10
Payer: COMMERCIAL

## 2024-12-10 VITALS
TEMPERATURE: 96.2 F | HEIGHT: 73 IN | OXYGEN SATURATION: 96 % | HEART RATE: 101 BPM | DIASTOLIC BLOOD PRESSURE: 94 MMHG | WEIGHT: 231.6 LBS | BODY MASS INDEX: 30.69 KG/M2 | SYSTOLIC BLOOD PRESSURE: 146 MMHG

## 2024-12-10 DIAGNOSIS — J01.00 ACUTE NON-RECURRENT MAXILLARY SINUSITIS: Primary | ICD-10-CM

## 2024-12-10 DIAGNOSIS — J06.9 UPPER RESPIRATORY TRACT INFECTION, UNSPECIFIED TYPE: ICD-10-CM

## 2024-12-10 PROCEDURE — 99213 OFFICE O/P EST LOW 20 MIN: CPT | Performed by: NURSE PRACTITIONER

## 2024-12-10 RX ORDER — CLINDAMYCIN HYDROCHLORIDE 300 MG/1
CAPSULE ORAL
COMMUNITY
Start: 2024-11-30

## 2024-12-10 RX ORDER — CHLORCYCLIZINE HYDROCHLORIDE AND PSEUDOEPHEDRINE HYDROCHLORIDE 25; 60 MG/1; MG/1
1 TABLET ORAL EVERY 8 HOURS PRN
Qty: 42 TABLET | Refills: 0 | Status: SHIPPED | OUTPATIENT
Start: 2024-12-10

## 2024-12-10 NOTE — PROGRESS NOTES
"Chief Complaint  Sinusitis    Subjective         George Hunter presents to [facility]  Patient presents to the office with complaints of sinus congestion, headaches, ear fullness.  Patient states symptoms have been present for couple days.  He is currently taking clindamycin for a tooth infection.  I did explain to the patient that the clindamycin would help with his upper respiratory symptoms.  I did discuss starting Stahist.  He denies any fevers at this time.  He denies any nausea vomiting or diarrhea.  He denies any general body aches    Sinusitis         Objective     Vitals:    12/10/24 1107   BP: 146/94   BP Location: Right arm   Patient Position: Sitting   Cuff Size: Adult   Pulse: 101   Temp: 96.2 °F (35.7 °C)   TempSrc: Temporal   SpO2: 96%   Weight: 105 kg (231 lb 9.6 oz)   Height: 185.4 cm (73\")      Body mass index is 30.56 kg/m².            Physical Exam  Constitutional:       Appearance: Normal appearance.   HENT:      Right Ear: Tympanic membrane, ear canal and external ear normal.      Left Ear: Tympanic membrane, ear canal and external ear normal.      Nose: Congestion and rhinorrhea present.      Right Sinus: Maxillary sinus tenderness present.      Left Sinus: Maxillary sinus tenderness present.   Cardiovascular:      Rate and Rhythm: Normal rate and regular rhythm.      Pulses: Normal pulses.      Heart sounds: Normal heart sounds, S1 normal and S2 normal. No murmur heard.  Pulmonary:      Effort: Pulmonary effort is normal. No respiratory distress.      Breath sounds: Normal breath sounds.   Skin:     General: Skin is warm and dry.   Neurological:      Mental Status: He is alert and oriented to person, place, and time.   Psychiatric:         Attention and Perception: Attention normal.         Mood and Affect: Mood normal.         Behavior: Behavior normal.          Result Review :   The following data was reviewed by: MALCOM Farias on " 12/10/2024:      Procedures    Assessment and Plan   Diagnoses and all orders for this visit:    1. Acute non-recurrent maxillary sinusitis (Primary)  -     Chlorcyclizine-Pseudoephed (Stahist AD) 25-60 MG tablet; Take 1 tablet by mouth Every 8 (Eight) Hours As Needed (congestion).  Dispense: 42 tablet; Refill: 0    2. Upper respiratory tract infection, unspecified type  -     Chlorcyclizine-Pseudoephed (Stahist AD) 25-60 MG tablet; Take 1 tablet by mouth Every 8 (Eight) Hours As Needed (congestion).  Dispense: 42 tablet; Refill: 0          Follow Up   Return if symptoms worsen or fail to improve.  Patient was given instructions and counseling regarding his condition or for health maintenance advice. Please see specific information pulled into the AVS if appropriate.

## 2024-12-17 ENCOUNTER — TELEMEDICINE (OUTPATIENT)
Dept: FAMILY MEDICINE CLINIC | Facility: TELEHEALTH | Age: 44
End: 2024-12-17
Payer: COMMERCIAL

## 2024-12-17 VITALS — HEART RATE: 84 BPM | TEMPERATURE: 99 F

## 2024-12-17 DIAGNOSIS — J01.40 ACUTE PANSINUSITIS, RECURRENCE NOT SPECIFIED: Primary | ICD-10-CM

## 2024-12-17 DIAGNOSIS — J40 BRONCHITIS: ICD-10-CM

## 2024-12-17 PROCEDURE — 99213 OFFICE O/P EST LOW 20 MIN: CPT | Performed by: NURSE PRACTITIONER

## 2024-12-17 RX ORDER — DOXYCYCLINE 100 MG/1
100 CAPSULE ORAL 2 TIMES DAILY
Qty: 20 CAPSULE | Refills: 0 | Status: SHIPPED | OUTPATIENT
Start: 2024-12-17 | End: 2024-12-27

## 2024-12-17 RX ORDER — PREDNISONE 10 MG/1
TABLET ORAL
Qty: 21 TABLET | Refills: 0 | Status: SHIPPED | OUTPATIENT
Start: 2024-12-17

## 2024-12-17 NOTE — PATIENT INSTRUCTIONS
Sinus Infection, Adult  A sinus infection, also called sinusitis, is inflammation of your sinuses. Sinuses are hollow spaces in the bones around your face. Your sinuses are located:  Around your eyes.  In the middle of your forehead.  Behind your nose.  In your cheekbones.  Mucus normally drains out of your sinuses. When your nasal tissues become inflamed or swollen, mucus can become trapped or blocked. This allows bacteria, viruses, and fungi to grow, which leads to infection. Most infections of the sinuses are caused by a virus.  A sinus infection can develop quickly. It can last for up to 4 weeks (acute) or for more than 12 weeks (chronic). A sinus infection often develops after a cold.  What are the causes?  This condition is caused by anything that creates swelling in the sinuses or stops mucus from draining. This includes:  Allergies.  Asthma.  Infection from bacteria or viruses.  Deformities or blockages in your nose or sinuses.  Abnormal growths in the nose (nasal polyps).  Pollutants, such as chemicals or irritants in the air.  Infection from fungi. This is rare.  What increases the risk?  You are more likely to develop this condition if you:  Have a weak body defense system (immune system).  Do a lot of swimming or diving.  Overuse nasal sprays.  Smoke.  What are the signs or symptoms?  The main symptoms of this condition are pain and a feeling of pressure around the affected sinuses. Other symptoms include:  Stuffy nose or congestion that makes it difficult to breathe through your nose.  Thick yellow or greenish drainage from your nose.  Tenderness, swelling, and warmth over the affected sinuses.  A cough that may get worse at night.  Decreased sense of smell and taste.  Extra mucus that collects in the throat or the back of the nose (postnasal drip) causing a sore throat or bad breath.  Tiredness (fatigue).  Fever.  How is this diagnosed?  This condition is diagnosed based on:  Your symptoms.  Your  medical history.  A physical exam.  Tests to find out if your condition is acute or chronic. This may include:  Checking your nose for nasal polyps.  Viewing your sinuses using a device that has a light (endoscope).  Testing for allergies or bacteria.  Imaging tests, such as an MRI or CT scan.  In rare cases, a bone biopsy may be done to rule out more serious types of fungal sinus disease.  How is this treated?  Treatment for a sinus infection depends on the cause and whether your condition is chronic or acute.  If caused by a virus, your symptoms should go away on their own within 10 days. You may be given medicines to relieve symptoms. They include:  Medicines that shrink swollen nasal passages (decongestants).  A spray that eases inflammation of the nostrils (topical intranasal corticosteroids).  Rinses that help get rid of thick mucus in your nose (nasal saline washes).  Medicines that treat allergies (antihistamines).  Over-the-counter pain relievers.  If caused by bacteria, your health care provider may recommend waiting to see if your symptoms improve. Most bacterial infections will get better without antibiotic medicine. You may be given antibiotics if you have:  A severe infection.  A weak immune system.  If caused by narrow nasal passages or nasal polyps, surgery may be needed.  Follow these instructions at home:  Medicines  Take, use, or apply over-the-counter and prescription medicines only as told by your health care provider. These may include nasal sprays.  If you were prescribed an antibiotic medicine, take it as told by your health care provider. Do not stop taking the antibiotic even if you start to feel better.  Hydrate and humidify    Drink enough fluid to keep your urine pale yellow. Staying hydrated will help to thin your mucus.  Use a cool mist humidifier to keep the humidity level in your home above 50%.  Inhale steam for 10-15 minutes, 3-4 times a day, or as told by your health care  provider. You can do this in the bathroom while a hot shower is running.  Limit your exposure to cool or dry air.  Rest  Rest as much as possible.  Sleep with your head raised (elevated).  Make sure you get enough sleep each night.  General instructions    Apply a warm, moist washcloth to your face 3-4 times a day or as told by your health care provider. This will help with discomfort.  Use nasal saline washes as often as told by your health care provider.  Wash your hands often with soap and water to reduce your exposure to germs. If soap and water are not available, use hand .  Do not smoke. Avoid being around people who are smoking (secondhand smoke).  Keep all follow-up visits. This is important.  Contact a health care provider if:  You have a fever.  Your symptoms get worse.  Your symptoms do not improve within 10 days.  Get help right away if:  You have a severe headache.  You have persistent vomiting.  You have severe pain or swelling around your face or eyes.  You have vision problems.  You develop confusion.  Your neck is stiff.  You have trouble breathing.  These symptoms may be an emergency. Get help right away. Call 911.  Do not wait to see if the symptoms will go away.  Do not drive yourself to the hospital.  Summary  A sinus infection is soreness and inflammation of your sinuses. Sinuses are hollow spaces in the bones around your face.  This condition is caused by nasal tissues that become inflamed or swollen. The swelling traps or blocks the flow of mucus. This allows bacteria, viruses, and fungi to grow, which leads to infection.  If you were prescribed an antibiotic medicine, take it as told by your health care provider. Do not stop taking the antibiotic even if you start to feel better.  Keep all follow-up visits. This is important.  This information is not intended to replace advice given to you by your health care provider. Make sure you discuss any questions you have with your health  care provider.  Document Revised: 11/22/2022 Document Reviewed: 11/22/2022  [employer] Patient Education © 2024 [employer]  Acute Bronchitis, Adult    Acute bronchitis is sudden inflammation of the main airways (bronchi) that come off the windpipe (trachea) in the lungs. The swelling causes the airways to get smaller and make more mucus than normal. This can make it hard to breathe and can cause coughing or noisy breathing (wheezing).  Acute bronchitis may last several weeks. The cough may last longer. Allergies, asthma, and exposure to smoke may make the condition worse.  What are the causes?  This condition can be caused by germs and by substances that irritate the lungs, including:  Cold and flu viruses. The most common cause of this condition is the virus that causes the common cold.  Bacteria. This is less common.  Breathing in substances that irritate the lungs, including:  Smoke from cigarettes and other forms of tobacco.  Dust and pollen.  Fumes from household cleaning products, gases, or burned fuel.  Indoor or outdoor air pollution.  What increases the risk?  The following factors may make you more likely to develop this condition:  A weak body's defense system, also called the immune system.  A condition that affects your lungs and breathing, such as asthma.  What are the signs or symptoms?  Common symptoms of this condition include:  Coughing. This may bring up clear, yellow, or green mucus from your lungs (sputum).  Wheezing.  Runny or stuffy nose.  Having too much mucus in your lungs (chest congestion).  Shortness of breath.  Aches and pains, including sore throat or chest.  How is this diagnosed?  This condition is usually diagnosed based on:  Your symptoms and medical history.  A physical exam.  You may also have other tests, including tests to rule out other conditions, such as pneumonia. These tests include:  A test of lung function.  Test of a mucus sample to look for the presence of  bacteria.  Tests to check the oxygen level in your blood.  Blood tests.  Chest X-ray.  How is this treated?  Most cases of acute bronchitis clear up over time without treatment. Your health care provider may recommend:  Drinking more fluids to help thin your mucus so it is easier to cough up.  Taking inhaled medicine (inhaler) to improve air flow in and out of your lungs.  Using a vaporizer or a humidifier. These are machines that add water to the air to help you breathe better.  Taking a medicine that thins mucus and clears congestion (expectorant).  Taking a medicine that prevents or stops coughing (cough suppressant).  It is not common to take an antibiotic medicine for this condition.  Follow these instructions at home:    Take over-the-counter and prescription medicines only as told by your health care provider.  Use an inhaler, vaporizer, or humidifier as told by your health care provider.  Take two teaspoons (10 mL) of honey at bedtime to lessen coughing at night.  Drink enough fluid to keep your urine pale yellow.  Do not use any products that contain nicotine or tobacco. These products include cigarettes, chewing tobacco, and vaping devices, such as e-cigarettes. If you need help quitting, ask your health care provider.  Get plenty of rest.  Return to your normal activities as told by your health care provider. Ask your health care provider what activities are safe for you.  Keep all follow-up visits. This is important.  How is this prevented?  To lower your risk of getting this condition again:  Wash your hands often with soap and water for at least 20 seconds. If soap and water are not available, use hand .  Avoid contact with people who have cold symptoms.  Try not to touch your mouth, nose, or eyes with your hands.  Avoid breathing in smoke or chemical fumes. Breathing smoke or chemical fumes will make your condition worse.  Get the flu shot every year.  Contact a health care provider if:  Your  symptoms do not improve after 2 weeks.  You have trouble coughing up the mucus.  Your cough keeps you awake at night.  You have a fever.  Get help right away if you:  Cough up blood.  Feel pain in your chest.  Have severe shortness of breath.  Faint or keep feeling like you are going to faint.  Have a severe headache.  Have a fever or chills that get worse.  These symptoms may represent a serious problem that is an emergency. Do not wait to see if the symptoms will go away. Get medical help right away. Call your local emergency services (911 in the [place] Do not drive yourself to the hospital.  Summary  Acute bronchitis is inflammation of the main airways (bronchi) that come off the windpipe (trachea) in the lungs. The swelling causes the airways to get smaller and make more mucus than normal.  Drinking more fluids can help thin your mucus so it is easier to cough up.  Take over-the-counter and prescription medicines only as told by your health care provider.  Do not use any products that contain nicotine or tobacco. These products include cigarettes, chewing tobacco, and vaping devices, such as e-cigarettes. If you need help quitting, ask your health care provider.  Contact a health care provider if your symptoms do not improve after 2 weeks.  This information is not intended to replace advice given to you by your health care provider. Make sure you discuss any questions you have with your health care provider.  Document Revised: 03/30/2023 Document Reviewed: 04/20/2022  [employer] Patient Education © 2024 [employer]

## 2024-12-17 NOTE — PROGRESS NOTES
You have chosen to receive care through a telehealth visit.  Do you consent to use a video/audio connection for your medical care today? Yes     Patient or patient representative verbalized consent for the use of Ambient Listening during the visit with  MALCOM Carroll for chart documentation. 12/17/2024  08:50 EST    CHIEF COMPLAINT  No chief complaint on file.        HPI  George Hunter is a 44 y.o. male  presents with complaint of    History of Present Illness  The patient is a 44-year-old male presenting via virtual visit with cough and congestion.    He experienced severe congestion and pain last week, which he attributes to a sinus infection. He sought medical attention from his otolaryngologist and was prescribed an additional medication to alleviate his symptoms, but the pharmacy did not receive the prescription. His son recently had a similar illness, and his wife also fell ill over the weekend. As of last night, he began experiencing a cough, postnasal drainage, and chest congestion. He reports no wheezing or shortness of breath and has not expectorated any sputum. His nasal discharge is watery in consistency. He reports no fever but mentions a slight earache. He is allergic to PENICILLIN. He was previously on clindamycin for a dental issue, which was getting infected. He has a routine dental appointment scheduled for next month. His cough is not severe enough to require medication, but it worsens when he is in a supine position. He has Bromfed at home.    During his last doctor's visit, he was informed that his blood pressure was slightly elevated, which is unusual for him as he typically has normal blood pressure readings.    ALLERGIES  The patient is allergic to PENICILLINS.    MEDICATIONS  Past: clindamycin       Review of Systems  See HPI    Past Medical History:   Diagnosis Date    Allergic     Allergies     Anxiety     Back injury     Broken bones     Deafness     Diabetes     Diabetes mellitus  type I     Foot pain, bilateral 2018    Fracture     Hammertoe     Head injury     Hernia cerebri     Hyperlipidemia     Hypothyroidism     Neuropathy in diabetes 2009    Plantar fascial fibromatosis of both feet 2018    Toe pain, right        Family History   Family history unknown: Yes       Social History     Socioeconomic History    Marital status:     Number of children: 1   Tobacco Use    Smoking status: Former     Average packs/day: 0.5 packs/day for 14.3 years (7.1 ttl pk-yrs)     Types: Cigarettes, Cigars     Start date: 7/23/1997     Passive exposure: Past    Smokeless tobacco: Never    Tobacco comments:     Quit 2011   Vaping Use    Vaping status: Never Used   Substance and Sexual Activity    Alcohol use: Not Currently    Drug use: Never    Sexual activity: Yes     Partners: Female       George Hunter  reports that he has quit smoking. His smoking use included cigarettes and cigars. He started smoking about 27 years ago. He has a 7.1 pack-year smoking history. He has been exposed to tobacco smoke. He has never used smokeless tobacco.            Pulse 84   Temp 99 °F (37.2 °C)     PHYSICAL EXAM  Physical Exam   Constitutional: He is oriented to person, place, and time. He appears well-developed and well-nourished. He does not have a sickly appearance. He does not appear ill.   HENT:   Head: Normocephalic and atraumatic.   Bilat TMs are intact, flat, dull, no effusion, no erythema   Pulmonary/Chest: Effort normal.  No respiratory distress.  Neurological: He is alert and oriented to person, place, and time.         Diagnoses and all orders for this visit:    1. Acute pansinusitis, recurrence not specified (Primary)  -     doxycycline (MONODOX) 100 MG capsule; Take 1 capsule by mouth 2 (Two) Times a Day for 10 days.  Dispense: 20 capsule; Refill: 0    2. Bronchitis  -     predniSONE (DELTASONE) 10 MG (21) dose pack; Use as directed on package  Dispense: 21 tablet; Refill: 0    --take medications as  prescribed  --increase fluids, rest as needed, tylenol or ibuprofen for pain  --f/u in 5-7 days if no improvement      Assessment & Plan  1. Sinusitis.  He reports experiencing significant congestion and pain last week, which he attributes to a sinus infection. He was previously on clindamycin for a dental issue and has since completed that course. He was unable to obtain the prescribed medication from his ENT due to a pharmacy issue. Doxycycline 100 mg will be prescribed, to be taken twice daily for 10 days. Prednisone will also be prescribed, to be taken as directed on the package. Prescriptions will be sent to [email or web address] in [place].    2. Bronchitis.  He reports that his congestion has progressed to his chest, and he has started coughing as of last night. There is no wheezing or shortness of breath, and he has not coughed up any phlegm yet. He has a low-grade fever and slight ear pain, with some fluid but no redness in the ears. He is advised to use Bromfed for nighttime cough relief if needed. Doxycycline 100 mg will be prescribed, to be taken twice daily for 10 days. Prednisone will also be prescribed, to be taken as directed on the package. Prescriptions will be sent to [email or web address] in [place].    3. Elevated blood pressure.  He reports that his blood pressure was noted to be slightly elevated during a recent doctor's visit, which is unusual for him. This is likely due to his current illness.         FOLLOW-UP  As discussed during visit with PCP/[facility] if no improvement or Urgent Care/Emergency Department if worsening of symptoms    Patient verbalizes understanding of medication dosage, comfort measures, instructions for treatment and follow-up.    Lori Reddy, APRN  12/17/2024  08:50 EST    Mode of Visit: Video  Location of patient: -OTHER-: [facility]  Location of provider: +HOME+  You have chosen to receive care through a telehealth visit.  The patient has  signed the video visit consent form.  The visit included audio and video interaction. No technical issues occurred during this visit.      Note Disclaimer: At [facility], we believe that sharing information builds trust and better   relationships. You are receiving this note because you recently visited [facility]. It is possible you   will see health information before a provider has talked with you about it. This kind of information can   be easy to misunderstand. To help you fully understand what it means for your health, we urge you to   discuss this note with your provider.

## 2024-12-23 DIAGNOSIS — E10.65 TYPE 1 DIABETES MELLITUS WITH HYPERGLYCEMIA: ICD-10-CM

## 2024-12-23 RX ORDER — INSULIN DEGLUDEC 200 U/ML
44 INJECTION, SOLUTION SUBCUTANEOUS DAILY
Qty: 9 ML | Refills: 0 | Status: SHIPPED | OUTPATIENT
Start: 2024-12-23

## 2024-12-27 ENCOUNTER — LAB (OUTPATIENT)
Dept: LAB | Facility: HOSPITAL | Age: 44
End: 2024-12-27
Payer: COMMERCIAL

## 2024-12-27 ENCOUNTER — OFFICE VISIT (OUTPATIENT)
Dept: FAMILY MEDICINE CLINIC | Facility: CLINIC | Age: 44
End: 2024-12-27
Payer: COMMERCIAL

## 2024-12-27 VITALS
DIASTOLIC BLOOD PRESSURE: 82 MMHG | SYSTOLIC BLOOD PRESSURE: 124 MMHG | HEIGHT: 73 IN | BODY MASS INDEX: 30.99 KG/M2 | OXYGEN SATURATION: 96 % | TEMPERATURE: 96.5 F | HEART RATE: 99 BPM | WEIGHT: 233.8 LBS

## 2024-12-27 DIAGNOSIS — E10.65 TYPE 1 DIABETES MELLITUS WITH HYPERGLYCEMIA: Primary | ICD-10-CM

## 2024-12-27 DIAGNOSIS — E10.65 TYPE 1 DIABETES MELLITUS WITH HYPERGLYCEMIA: ICD-10-CM

## 2024-12-27 DIAGNOSIS — E03.9 HYPOTHYROIDISM, UNSPECIFIED TYPE: ICD-10-CM

## 2024-12-27 LAB
ALBUMIN SERPL-MCNC: 3.9 G/DL (ref 3.5–5.2)
ALBUMIN UR-MCNC: <1.2 MG/DL
ALBUMIN/GLOB SERPL: 1.2 G/DL
ALP SERPL-CCNC: 114 U/L (ref 39–117)
ALT SERPL W P-5'-P-CCNC: 34 U/L (ref 1–41)
ANION GAP SERPL CALCULATED.3IONS-SCNC: 10.8 MMOL/L (ref 5–15)
AST SERPL-CCNC: 27 U/L (ref 1–40)
BILIRUB SERPL-MCNC: 0.6 MG/DL (ref 0–1.2)
BUN SERPL-MCNC: 12 MG/DL (ref 6–20)
BUN/CREAT SERPL: 12.6 (ref 7–25)
CALCIUM SPEC-SCNC: 9 MG/DL (ref 8.6–10.5)
CHLORIDE SERPL-SCNC: 100 MMOL/L (ref 98–107)
CHOLEST SERPL-MCNC: 206 MG/DL (ref 0–200)
CO2 SERPL-SCNC: 26.2 MMOL/L (ref 22–29)
CREAT SERPL-MCNC: 0.95 MG/DL (ref 0.76–1.27)
CREAT UR-MCNC: 163.6 MG/DL
DEPRECATED RDW RBC AUTO: 38.7 FL (ref 37–54)
EGFRCR SERPLBLD CKD-EPI 2021: 100.6 ML/MIN/1.73
ERYTHROCYTE [DISTWIDTH] IN BLOOD BY AUTOMATED COUNT: 12.2 % (ref 12.3–15.4)
GLOBULIN UR ELPH-MCNC: 3.2 GM/DL
GLUCOSE SERPL-MCNC: 353 MG/DL (ref 65–99)
HBA1C MFR BLD: 7.6 % (ref 4.8–5.6)
HCT VFR BLD AUTO: 47.6 % (ref 37.5–51)
HDLC SERPL-MCNC: 38 MG/DL (ref 40–60)
HGB BLD-MCNC: 16.2 G/DL (ref 13–17.7)
LDLC SERPL CALC-MCNC: 148 MG/DL (ref 0–100)
LDLC/HDLC SERPL: 3.85 {RATIO}
MCH RBC QN AUTO: 29.8 PG (ref 26.6–33)
MCHC RBC AUTO-ENTMCNC: 34 G/DL (ref 31.5–35.7)
MCV RBC AUTO: 87.5 FL (ref 79–97)
MICROALBUMIN/CREAT UR: NORMAL MG/G{CREAT}
PLATELET # BLD AUTO: 308 10*3/MM3 (ref 140–450)
PMV BLD AUTO: 10.3 FL (ref 6–12)
POTASSIUM SERPL-SCNC: 4.1 MMOL/L (ref 3.5–5.2)
PROT SERPL-MCNC: 7.1 G/DL (ref 6–8.5)
RBC # BLD AUTO: 5.44 10*6/MM3 (ref 4.14–5.8)
SODIUM SERPL-SCNC: 137 MMOL/L (ref 136–145)
TRIGL SERPL-MCNC: 108 MG/DL (ref 0–150)
TSH SERPL DL<=0.05 MIU/L-ACNC: 6.68 UIU/ML (ref 0.27–4.2)
VLDLC SERPL-MCNC: 20 MG/DL (ref 5–40)
WBC NRBC COR # BLD AUTO: 6.47 10*3/MM3 (ref 3.4–10.8)

## 2024-12-27 PROCEDURE — 82570 ASSAY OF URINE CREATININE: CPT | Performed by: NURSE PRACTITIONER

## 2024-12-27 PROCEDURE — 82043 UR ALBUMIN QUANTITATIVE: CPT | Performed by: NURSE PRACTITIONER

## 2024-12-27 PROCEDURE — 36415 COLL VENOUS BLD VENIPUNCTURE: CPT

## 2024-12-27 PROCEDURE — 99213 OFFICE O/P EST LOW 20 MIN: CPT | Performed by: NURSE PRACTITIONER

## 2024-12-27 PROCEDURE — 83036 HEMOGLOBIN GLYCOSYLATED A1C: CPT

## 2024-12-27 PROCEDURE — 80050 GENERAL HEALTH PANEL: CPT

## 2024-12-27 PROCEDURE — 80061 LIPID PANEL: CPT

## 2024-12-27 NOTE — PROGRESS NOTES
"Chief Complaint  Diabetes (3 month follow up)    Subjective         George Hunter presents to [facility]  Presents to the office for a follow-up regarding his diabetes and hypothyroidism.  Patient is still waiting to see endocrinology in [facility].  Patient does state that he is running out of the insulin early.  I did ask him to check his pens to make sure that the pharmacy is giving him the U200  concentration.  I explained that he has number of pens are based on this calculation.  She is due for routine lab work and he states he will go to the lab when he leaves here.  Denies any other concerns at this time.    Diabetes       Objective     Vitals:    12/27/24 0715   BP: 124/82   BP Location: Right arm   Patient Position: Sitting   Cuff Size: Adult   Pulse: 99   Temp: 96.5 °F (35.8 °C)   TempSrc: Temporal   SpO2: 96%   Weight: 106 kg (233 lb 12.8 oz)   Height: 185.4 cm (73\")      Body mass index is 30.85 kg/m².    BMI is >= 30 and <35. (Class 1 Obesity). The following options were offered after discussion;: nutrition counseling/recommendations        Physical Exam  Vitals reviewed.   Constitutional:       Appearance: Normal appearance.   HENT:      Right Ear: Tympanic membrane, ear canal and external ear normal.      Left Ear: Tympanic membrane, ear canal and external ear normal.   Cardiovascular:      Rate and Rhythm: Normal rate and regular rhythm.      Pulses: Normal pulses.      Heart sounds: Normal heart sounds, S1 normal and S2 normal. No murmur heard.  Pulmonary:      Effort: Pulmonary effort is normal. No respiratory distress.      Breath sounds: Normal breath sounds.   Skin:     General: Skin is warm and dry.   Neurological:      Mental Status: He is alert and oriented to person, place, and time.   Psychiatric:         Attention and Perception: Attention normal.         Mood and Affect: Mood normal.         Behavior: Behavior normal.        Result Review :   The " following data was reviewed by: MALCOM Farias on 12/27/2024:      Procedures    Assessment and Plan   Diagnoses and all orders for this visit:    1. Type 1 diabetes mellitus with hyperglycemia (Primary)  -     CBC (No Diff); Future  -     Comprehensive Metabolic Panel; Future  -     Hemoglobin A1c; Future  -     Lipid Panel; Future  -     Microalbumin / Creatinine Urine Ratio - Urine, Clean Catch; Future  -     TSH; Future    2. Hypothyroidism, unspecified type  -     TSH; Future          Follow Up   Return in about 6 months (around 6/27/2025) for Recheck.  Patient was given instructions and counseling regarding his condition or for health maintenance advice. Please see specific information pulled into the AVS if appropriate.

## 2025-01-17 ENCOUNTER — HOSPITAL ENCOUNTER (EMERGENCY)
Facility: HOSPITAL | Age: 45
Discharge: HOME | End: 2025-01-17
Attending: EMERGENCY MEDICINE
Payer: COMMERCIAL

## 2025-01-17 ENCOUNTER — APPOINTMENT (OUTPATIENT)
Dept: GENERAL RADIOLOGY | Facility: HOSPITAL | Age: 45
End: 2025-01-17
Payer: COMMERCIAL

## 2025-01-17 VITALS
WEIGHT: 232.37 LBS | OXYGEN SATURATION: 92 % | DIASTOLIC BLOOD PRESSURE: 74 MMHG | RESPIRATION RATE: 16 BRPM | SYSTOLIC BLOOD PRESSURE: 107 MMHG | HEART RATE: 72 BPM | TEMPERATURE: 98.1 F | HEIGHT: 73 IN | BODY MASS INDEX: 30.8 KG/M2

## 2025-01-17 DIAGNOSIS — R11.2 NAUSEA AND VOMITING, UNSPECIFIED VOMITING TYPE: Primary | ICD-10-CM

## 2025-01-17 DIAGNOSIS — E16.2 HYPOGLYCEMIA: ICD-10-CM

## 2025-01-17 DIAGNOSIS — R51.9 NONINTRACTABLE HEADACHE, UNSPECIFIED CHRONICITY PATTERN, UNSPECIFIED HEADACHE TYPE: ICD-10-CM

## 2025-01-17 LAB
ALBUMIN SERPL-MCNC: 4.1 G/DL (ref 3.5–5.2)
ALBUMIN/GLOB SERPL: 1.3 G/DL
ALP SERPL-CCNC: 101 U/L (ref 39–117)
ALT SERPL W P-5'-P-CCNC: 29 U/L (ref 1–41)
ANION GAP SERPL CALCULATED.3IONS-SCNC: 9.3 MMOL/L (ref 5–15)
AST SERPL-CCNC: 26 U/L (ref 1–40)
BACTERIA UR QL AUTO: NORMAL /HPF
BASOPHILS # BLD AUTO: 0.04 10*3/MM3 (ref 0–0.2)
BASOPHILS NFR BLD AUTO: 0.5 % (ref 0–1.5)
BILIRUB SERPL-MCNC: 0.7 MG/DL (ref 0–1.2)
BILIRUB UR QL STRIP: NEGATIVE
BUN SERPL-MCNC: 11 MG/DL (ref 6–20)
BUN/CREAT SERPL: 11.7 (ref 7–25)
CALCIUM SPEC-SCNC: 9 MG/DL (ref 8.6–10.5)
CHLORIDE SERPL-SCNC: 103 MMOL/L (ref 98–107)
CLARITY UR: CLEAR
CO2 SERPL-SCNC: 28.7 MMOL/L (ref 22–29)
COLOR UR: YELLOW
CREAT SERPL-MCNC: 0.94 MG/DL (ref 0.76–1.27)
DEPRECATED RDW RBC AUTO: 39.3 FL (ref 37–54)
EGFRCR SERPLBLD CKD-EPI 2021: 101.9 ML/MIN/1.73
EOSINOPHIL # BLD AUTO: 0.08 10*3/MM3 (ref 0–0.4)
EOSINOPHIL NFR BLD AUTO: 1.1 % (ref 0.3–6.2)
ERYTHROCYTE [DISTWIDTH] IN BLOOD BY AUTOMATED COUNT: 12.5 % (ref 12.3–15.4)
GLOBULIN UR ELPH-MCNC: 3.2 GM/DL
GLUCOSE BLDC GLUCOMTR-MCNC: 211 MG/DL (ref 70–99)
GLUCOSE BLDC GLUCOMTR-MCNC: 45 MG/DL (ref 70–99)
GLUCOSE SERPL-MCNC: 51 MG/DL (ref 65–99)
GLUCOSE UR STRIP-MCNC: NEGATIVE MG/DL
HCT VFR BLD AUTO: 50.1 % (ref 37.5–51)
HGB BLD-MCNC: 16.9 G/DL (ref 13–17.7)
HGB UR QL STRIP.AUTO: NEGATIVE
HOLD SPECIMEN: NORMAL
HYALINE CASTS UR QL AUTO: NORMAL /LPF
IMM GRANULOCYTES # BLD AUTO: 0.02 10*3/MM3 (ref 0–0.05)
IMM GRANULOCYTES NFR BLD AUTO: 0.3 % (ref 0–0.5)
KETONES UR QL STRIP: ABNORMAL
LEUKOCYTE ESTERASE UR QL STRIP.AUTO: NEGATIVE
LIPASE SERPL-CCNC: 13 U/L (ref 13–60)
LYMPHOCYTES # BLD AUTO: 1.59 10*3/MM3 (ref 0.7–3.1)
LYMPHOCYTES NFR BLD AUTO: 21.2 % (ref 19.6–45.3)
MCH RBC QN AUTO: 29.2 PG (ref 26.6–33)
MCHC RBC AUTO-ENTMCNC: 33.7 G/DL (ref 31.5–35.7)
MCV RBC AUTO: 86.5 FL (ref 79–97)
MONOCYTES # BLD AUTO: 0.45 10*3/MM3 (ref 0.1–0.9)
MONOCYTES NFR BLD AUTO: 6 % (ref 5–12)
NEUTROPHILS NFR BLD AUTO: 5.33 10*3/MM3 (ref 1.7–7)
NEUTROPHILS NFR BLD AUTO: 70.9 % (ref 42.7–76)
NITRITE UR QL STRIP: NEGATIVE
NRBC BLD AUTO-RTO: 0 /100 WBC (ref 0–0.2)
PH UR STRIP.AUTO: 7 [PH] (ref 5–8)
PLATELET # BLD AUTO: 284 10*3/MM3 (ref 140–450)
PMV BLD AUTO: 9.5 FL (ref 6–12)
POTASSIUM SERPL-SCNC: 4 MMOL/L (ref 3.5–5.2)
PROT SERPL-MCNC: 7.3 G/DL (ref 6–8.5)
PROT UR QL STRIP: ABNORMAL
RBC # BLD AUTO: 5.79 10*6/MM3 (ref 4.14–5.8)
RBC # UR STRIP: NORMAL /HPF
REF LAB TEST METHOD: NORMAL
SODIUM SERPL-SCNC: 141 MMOL/L (ref 136–145)
SP GR UR STRIP: 1.02 (ref 1–1.03)
SQUAMOUS #/AREA URNS HPF: NORMAL /HPF
UROBILINOGEN UR QL STRIP: ABNORMAL
WBC # UR STRIP: NORMAL /HPF
WBC NRBC COR # BLD AUTO: 7.51 10*3/MM3 (ref 3.4–10.8)
WHOLE BLOOD HOLD COAG: NORMAL
WHOLE BLOOD HOLD SPECIMEN: NORMAL

## 2025-01-17 PROCEDURE — 83690 ASSAY OF LIPASE: CPT | Performed by: EMERGENCY MEDICINE

## 2025-01-17 PROCEDURE — 99283 EMERGENCY DEPT VISIT LOW MDM: CPT

## 2025-01-17 PROCEDURE — 96374 THER/PROPH/DIAG INJ IV PUSH: CPT

## 2025-01-17 PROCEDURE — 80053 COMPREHEN METABOLIC PANEL: CPT | Performed by: EMERGENCY MEDICINE

## 2025-01-17 PROCEDURE — 82948 REAGENT STRIP/BLOOD GLUCOSE: CPT

## 2025-01-17 PROCEDURE — 25010000002 ONDANSETRON PER 1 MG

## 2025-01-17 PROCEDURE — 25010000002 KETOROLAC TROMETHAMINE PER 15 MG

## 2025-01-17 PROCEDURE — 25010000002 METOCLOPRAMIDE PER 10 MG

## 2025-01-17 PROCEDURE — 81001 URINALYSIS AUTO W/SCOPE: CPT | Performed by: EMERGENCY MEDICINE

## 2025-01-17 PROCEDURE — 25010000002 DIPHENHYDRAMINE PER 50 MG

## 2025-01-17 PROCEDURE — 96375 TX/PRO/DX INJ NEW DRUG ADDON: CPT

## 2025-01-17 PROCEDURE — 85025 COMPLETE CBC W/AUTO DIFF WBC: CPT | Performed by: EMERGENCY MEDICINE

## 2025-01-17 PROCEDURE — 71045 X-RAY EXAM CHEST 1 VIEW: CPT

## 2025-01-17 RX ORDER — KETOROLAC TROMETHAMINE 30 MG/ML
30 INJECTION, SOLUTION INTRAMUSCULAR; INTRAVENOUS ONCE
Status: COMPLETED | OUTPATIENT
Start: 2025-01-17 | End: 2025-01-17

## 2025-01-17 RX ORDER — ONDANSETRON 2 MG/ML
4 INJECTION INTRAMUSCULAR; INTRAVENOUS ONCE
Status: COMPLETED | OUTPATIENT
Start: 2025-01-17 | End: 2025-01-17

## 2025-01-17 RX ORDER — SODIUM CHLORIDE 0.9 % (FLUSH) 0.9 %
10 SYRINGE (ML) INJECTION AS NEEDED
Status: DISCONTINUED | OUTPATIENT
Start: 2025-01-17 | End: 2025-01-17 | Stop reason: HOSPADM

## 2025-01-17 RX ORDER — DIPHENHYDRAMINE HYDROCHLORIDE 50 MG/ML
25 INJECTION INTRAMUSCULAR; INTRAVENOUS ONCE
Status: COMPLETED | OUTPATIENT
Start: 2025-01-17 | End: 2025-01-17

## 2025-01-17 RX ORDER — METOCLOPRAMIDE HYDROCHLORIDE 5 MG/ML
10 INJECTION INTRAMUSCULAR; INTRAVENOUS ONCE
Status: COMPLETED | OUTPATIENT
Start: 2025-01-17 | End: 2025-01-17

## 2025-01-17 RX ORDER — DICYCLOMINE HCL 20 MG
20 TABLET ORAL EVERY 6 HOURS
Qty: 20 TABLET | Refills: 0 | Status: SHIPPED | OUTPATIENT
Start: 2025-01-17

## 2025-01-17 RX ORDER — ONDANSETRON 4 MG/1
4 TABLET, ORALLY DISINTEGRATING ORAL 4 TIMES DAILY PRN
Qty: 20 TABLET | Refills: 0 | Status: SHIPPED | OUTPATIENT
Start: 2025-01-17

## 2025-01-17 RX ADMIN — DIPHENHYDRAMINE HYDROCHLORIDE 25 MG: 50 INJECTION, SOLUTION INTRAMUSCULAR; INTRAVENOUS at 13:39

## 2025-01-17 RX ADMIN — KETOROLAC TROMETHAMINE 30 MG: 30 INJECTION, SOLUTION INTRAMUSCULAR; INTRAVENOUS at 13:37

## 2025-01-17 RX ADMIN — ONDANSETRON 4 MG: 2 INJECTION INTRAMUSCULAR; INTRAVENOUS at 12:42

## 2025-01-17 RX ADMIN — METOCLOPRAMIDE 10 MG: 5 INJECTION, SOLUTION INTRAMUSCULAR; INTRAVENOUS at 13:38

## 2025-01-27 RX ORDER — OMEPRAZOLE 40 MG/1
CAPSULE, DELAYED RELEASE ORAL
Qty: 90 CAPSULE | Refills: 0 | Status: SHIPPED | OUTPATIENT
Start: 2025-01-27

## 2025-02-06 ENCOUNTER — TELEPHONE (OUTPATIENT)
Dept: FAMILY MEDICINE CLINIC | Facility: CLINIC | Age: 45
End: 2025-02-06
Payer: COMMERCIAL

## 2025-02-06 NOTE — TELEPHONE ENCOUNTER
Omeprazole 40MG dr capsules  status: PA Response - ApprovedCreated: February 3rd, 2025 [id number]: February 6th, 2025

## 2025-02-10 ENCOUNTER — OFFICE VISIT (OUTPATIENT)
Dept: FAMILY MEDICINE CLINIC | Facility: CLINIC | Age: 45
End: 2025-02-10
Payer: COMMERCIAL

## 2025-02-10 VITALS
HEIGHT: 73 IN | DIASTOLIC BLOOD PRESSURE: 86 MMHG | TEMPERATURE: 96.5 F | WEIGHT: 235 LBS | BODY MASS INDEX: 31.14 KG/M2 | OXYGEN SATURATION: 95 % | SYSTOLIC BLOOD PRESSURE: 136 MMHG | HEART RATE: 78 BPM

## 2025-02-10 DIAGNOSIS — E78.5 HYPERLIPIDEMIA, UNSPECIFIED HYPERLIPIDEMIA TYPE: ICD-10-CM

## 2025-02-10 DIAGNOSIS — M25.511 ACUTE PAIN OF RIGHT SHOULDER: Primary | ICD-10-CM

## 2025-02-10 DIAGNOSIS — H10.9 CONJUNCTIVITIS OF RIGHT EYE, UNSPECIFIED CONJUNCTIVITIS TYPE: ICD-10-CM

## 2025-02-10 DIAGNOSIS — I10 PRIMARY HYPERTENSION: ICD-10-CM

## 2025-02-10 PROCEDURE — 99214 OFFICE O/P EST MOD 30 MIN: CPT | Performed by: NURSE PRACTITIONER

## 2025-02-10 RX ORDER — POLYMYXIN B SULFATE AND TRIMETHOPRIM 1; 10000 MG/ML; [USP'U]/ML
1 SOLUTION OPHTHALMIC EVERY 4 HOURS
Qty: 10 ML | Refills: 0 | Status: SHIPPED | OUTPATIENT
Start: 2025-02-10

## 2025-02-10 RX ORDER — SIMVASTATIN 10 MG
10 TABLET ORAL NIGHTLY
Qty: 30 TABLET | Refills: 3 | Status: SHIPPED | OUTPATIENT
Start: 2025-02-10

## 2025-02-10 RX ORDER — LISINOPRIL 2.5 MG/1
2.5 TABLET ORAL DAILY
Qty: 90 TABLET | Refills: 1 | Status: SHIPPED | OUTPATIENT
Start: 2025-02-10

## 2025-02-10 RX ORDER — LISINOPRIL 2.5 MG/1
2.5 TABLET ORAL DAILY
COMMUNITY
End: 2025-02-10 | Stop reason: SDUPTHER

## 2025-02-10 NOTE — PROGRESS NOTES
"Chief Complaint  Eye Drainage    Subjective         George Hunter presents to [facility]  Presents to the office with complaints of right eye drainage.  He states that the area is itching and been draining since this morning.  He states that the symptoms and it originally started this past weekend.  He denies any fevers.  He does state that he has been taking allergy medication with no improvement.  Does state that his vision has been blurry secondary to the drainage.  Patient states that he needs refills on his other medications as well.  He states that his right shoulder continues to cause pain.  He states that he never heard from physical therapy and like to have this referral again.  He states that his range of motion has decreased secondary to the pain         Objective     Vitals:    02/10/25 0951   BP: 136/86   BP Location: Right arm   Patient Position: Sitting   Cuff Size: Adult   Pulse: 78   Temp: 96.5 °F (35.8 °C)   TempSrc: Temporal   SpO2: 95%   Weight: 107 kg (235 lb)   Height: 185.4 cm (73\")      Body mass index is 31 kg/m².             Physical Exam  Vitals reviewed.   Constitutional:       Appearance: Normal appearance.   Eyes:      General: Vision grossly intact.      Conjunctiva/sclera:      Right eye: Right conjunctiva is injected. Exudate present.   Cardiovascular:      Rate and Rhythm: Normal rate and regular rhythm.      Pulses: Normal pulses.      Heart sounds: Normal heart sounds, S1 normal and S2 normal. No murmur heard.  Pulmonary:      Effort: Pulmonary effort is normal. No respiratory distress.      Breath sounds: Normal breath sounds.   Skin:     General: Skin is warm and dry.   Neurological:      Mental Status: He is alert and oriented to person, place, and time.   Psychiatric:         Attention and Perception: Attention normal.         Mood and Affect: Mood normal.         Behavior: Behavior normal.          Result Review :   The following data was " reviewed by: MALCOM Farias on 02/10/2025:      Procedures    Assessment and Plan   Diagnoses and all orders for this visit:    1. Acute pain of right shoulder (Primary)  -     Ambulatory Referral to Physical Therapy for Evaluation & Treatment    2. Conjunctivitis of right eye, unspecified conjunctivitis type  -     trimethoprim-polymyxin b (POLYTRIM) 91084-4.1 UNIT/ML-% ophthalmic solution; Apply 1 drop to eye(s) as directed by provider Every 4 (Four) Hours.  Dispense: 10 mL; Refill: 0    3. Hyperlipidemia, unspecified hyperlipidemia type  -     simvastatin (ZOCOR) 10 MG tablet; Take 1 tablet by mouth Every Night.  Dispense: 30 tablet; Refill: 3    4. Primary hypertension  -     lisinopril (PRINIVIL,ZESTRIL) 2.5 MG tablet; Take 1 tablet by mouth Daily.  Dispense: 90 tablet; Refill: 1          Follow Up   Return if symptoms worsen or fail to improve.  Patient was given instructions and counseling regarding his condition or for health maintenance advice. Please see specific information pulled into the AVS if appropriate.

## 2025-02-10 NOTE — LETTER
February 10, 2025     Patient: George Hunter   YOB: 1980   Date of Visit: 2/10/2025       To Whom It May Concern:    It is my medical opinion that George Hunter may return to work tomorrow.         Sincerely,        MALCOM Farias    CC: No Recipients

## 2025-02-24 ENCOUNTER — TELEMEDICINE (OUTPATIENT)
Dept: FAMILY MEDICINE CLINIC | Facility: TELEHEALTH | Age: 45
End: 2025-02-24
Payer: COMMERCIAL

## 2025-02-24 DIAGNOSIS — J01.00 ACUTE MAXILLARY SINUSITIS, RECURRENCE NOT SPECIFIED: Primary | ICD-10-CM

## 2025-02-24 PROCEDURE — 99213 OFFICE O/P EST LOW 20 MIN: CPT | Performed by: NURSE PRACTITIONER

## 2025-02-24 RX ORDER — DOXYCYCLINE 100 MG/1
100 CAPSULE ORAL 2 TIMES DAILY
Qty: 14 CAPSULE | Refills: 0 | Status: SHIPPED | OUTPATIENT
Start: 2025-02-24 | End: 2025-03-03

## 2025-02-24 RX ORDER — FLUTICASONE PROPIONATE 50 MCG
2 SPRAY, SUSPENSION (ML) NASAL DAILY
COMMUNITY

## 2025-02-24 NOTE — PROGRESS NOTES
CHIEF COMPLAINT  Chief Complaint   Patient presents with    Sinusitis         HPI  George Hunter is a 44 y.o. male  presents with complaint of sinusitis. He has had this in the past. He has had this since last week and now having the pain he usually gets when he has sinus infection. Doxycycline works well.     Review of Systems   Constitutional:  Positive for fatigue. Negative for chills, diaphoresis and fever.   HENT:  Positive for congestion, postnasal drip, sinus pressure and sinus pain. Negative for rhinorrhea, sneezing and sore throat.    Respiratory:  Negative for cough.    Gastrointestinal:  Negative for diarrhea, nausea and vomiting.   Musculoskeletal:  Negative for myalgias.   Neurological:  Positive for headaches.       Past Medical History:   Diagnosis Date    Allergic     Allergies     Anxiety     Back injury     Broken bones     Deafness     Diabetes     Diabetes mellitus type I     Foot pain, bilateral 2018    Fracture     Hammertoe     Head injury     Hernia cerebri     Hyperlipidemia     Hypothyroidism     Neuropathy in diabetes 2009    Plantar fascial fibromatosis of both feet 2018    Toe pain, right        Family History   Family history unknown: Yes       Social History     Socioeconomic History    Marital status:     Number of children: 1   Tobacco Use    Smoking status: Former     Average packs/day: 0.5 packs/day for 14.3 years (7.1 ttl pk-yrs)     Types: Cigarettes, Cigars     Start date: 7/23/1997     Passive exposure: Past    Smokeless tobacco: Never    Tobacco comments:     Quit 2011   Vaping Use    Vaping status: Never Used   Substance and Sexual Activity    Alcohol use: Not Currently    Drug use: Never    Sexual activity: Yes     Partners: Female         There were no vitals taken for this visit.    PHYSICAL EXAM  Physical Exam   Constitutional: He is oriented to person, place, and time. He appears well-developed and well-nourished. He does not have a sickly appearance. He does not  appear ill. No distress.   HENT:   Head: Normocephalic and atraumatic.   Nose: Right sinus exhibits maxillary sinus tenderness. Right sinus exhibits no frontal sinus tenderness. Left sinus exhibits maxillary sinus tenderness. Left sinus exhibits no frontal sinus tenderness.   Eyes: EOM are normal.   Neck: Neck normal appearance.  Pulmonary/Chest: Effort normal.  No respiratory distress.  Neurological: He is alert and oriented to person, place, and time.   Skin: Skin is dry.   Psychiatric: He has a normal mood and affect.           Diagnoses and all orders for this visit:    1. Acute maxillary sinusitis, recurrence not specified (Primary)    Other orders  -     doxycycline (VIBRAMYCIN) 100 MG capsule; Take 1 capsule by mouth 2 (Two) Times a Day for 7 days.  Dispense: 14 capsule; Refill: 0        Mode of visit: Video   Myself and George Hunter participated in this visit. The patient is located in [address] [place] [address]. I am located in [place], [place]. [email or web address] IMN were utilized.   You have chosen to receive care through a telehealth visit.     Does the patient consent to use a video/audio connection for your medical care today? Yes       Note Disclaimer: At [facility], we believe that sharing information builds trust and better   relationships. You are receiving this note because you recently visited [facility]. It is possible you   will see health information before a provider has talked with you about it. This kind of information can   be easy to misunderstand. To help you fully understand what it means for your health, we urge you to   discuss this note with your provider.    Sona Pagan, MALCOM  02/24/2025  14:51 EST

## 2025-02-24 NOTE — LETTER
February 24, 2025     Patient: George Hunter   YOB: 1980   Date of Visit: 2/24/2025       To Whom It May Concern:    It is my medical opinion that George Hunter may return to work Tuesday 2/25/2025.         Sincerely,    MALCOM Aylaa     URGENT CARE VIDEO VISIT PROVIDER    CC: No Recipients

## 2025-02-27 DIAGNOSIS — E10.65 TYPE 1 DIABETES MELLITUS WITH HYPERGLYCEMIA: ICD-10-CM

## 2025-02-27 RX ORDER — INSULIN DEGLUDEC 200 U/ML
INJECTION, SOLUTION SUBCUTANEOUS
Qty: 9 ML | Refills: 0 | Status: SHIPPED | OUTPATIENT
Start: 2025-02-27

## 2025-03-06 ENCOUNTER — TREATMENT (OUTPATIENT)
Dept: PHYSICAL THERAPY | Facility: CLINIC | Age: 45
End: 2025-03-06
Payer: COMMERCIAL

## 2025-03-06 DIAGNOSIS — M25.511 CHRONIC RIGHT SHOULDER PAIN: Primary | ICD-10-CM

## 2025-03-06 DIAGNOSIS — M62.81 MUSCLE WEAKNESS OF RIGHT ARM: ICD-10-CM

## 2025-03-06 DIAGNOSIS — G89.29 CHRONIC RIGHT SHOULDER PAIN: Primary | ICD-10-CM

## 2025-03-06 DIAGNOSIS — M25.611 DECREASED ROM OF RIGHT SHOULDER: ICD-10-CM

## 2025-03-06 NOTE — PROGRESS NOTES
Physical Therapy Initial Evaluation and Plan of Care      [facility]: [address] [place] [address]      Patient: George Hunter   : 1980  Diagnosis/ICD-10 Code:  Chronic right shoulder pain [M25.511, G89.29]  Referring practitioner: MALCOM Farias  Date of Initial Visit: 3/6/2025  Encounter Date:  3/6/2025  Patient seen for 1 sessions           Subjective Questionnaire: QuickDASH: 29      Subjective   George Hunter reports to physical therapy w/ complaints of R shoulder pain. 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 Pt reports they hurt their shoulder at that time. Pt reports this past summer, and they noticed after the season, they have been having decreased ROM and strength. Pt reports they cannot lay on their side when they are sleeping. Pt reports when they go to reach, they have pain limiting them from moving their R shoulder further. Pt reports their overall shoulder feels weaker. Pt reports no real numbness or tingling in their R shoulder. Pt indicates the pain is on the anterior part of their shoulder.       Pt occupation: desk work     Current Pain: 1-2/10  Best Pain: 1-2/10  Worst Pain: 10/10  Quality of pain: sharp, feels like something is pinching or tearing.     Past Medical Hx:   Past Medical History:   Diagnosis Date    Allergic     Allergies     Anxiety     Back injury     Broken bones     Deafness     Diabetes     Diabetes mellitus type I     Foot pain, bilateral 2018    Fracture     Hammertoe     Head injury     Hernia cerebri     Hyperlipidemia     Hypothyroidism     Neuropathy in diabetes 2009    Plantar fascial fibromatosis of both feet 2018    Toe pain, right        Past Surgical History:   Procedure Laterality Date    COLONOSCOPY  2017    HERNIA REPAIR      TOENAIL EXCISION       Xray:   Impression: 2024 (time of original complaint of injury)     1. Soft tissue swelling suggesting a contusion overlying the upper arm.  2. No acute osseous  abnormality noted  Electronically Signed: Edgardo Fulton MD    Objective          Cervical/Thoracic Screen   Cervical range of motion within normal limits with the following exceptions: Pt treats ROM limitations w/ chiro. Pt has no familiar symptoms w/ this.     Active Range of Motion   Left Shoulder   Flexion: 145 degrees   Abduction: 125 degrees   External rotation BTH: T3   Internal rotation BTB: T12     Right Shoulder   Flexion: 106 degrees with pain  Abduction: 90 degrees with pain  External rotation BTH: C2 with pain  Internal rotation BTB: L5 with pain    Passive Range of Motion     Right Shoulder   Flexion: 90 degrees with pain  Abduction: 90 degrees with pain  External rotation 45°: 45 degrees   Internal rotation 45°: 45 degrees     Additional Passive Range of Motion Details  Muscle guarding at top of motion.     Strength/Myotome Testing     Left Shoulder     Planes of Motion   Flexion: 5   Abduction: 5   External rotation at 0°: 5   Internal rotation at 0°: 5     Isolated Muscles   Biceps: 5   Triceps: 5     Right Shoulder     Planes of Motion   Flexion: 4   Abduction: 4+   External rotation at 0°: 4   Internal rotation at 0°: 4+     Isolated Muscles   Biceps: 4   Triceps: 5     Additional Strength Details  Most pain w/ biceps testing     Tests   Cervical     Right   Positive active compression ([place]).     Additional Tests Details  Pain limiting ROM. Pain at rest.       See Exercise, Manual, and Modality Logs for complete treatment.       Assessment & Plan       Assessment  Impairments: abnormal or restricted ROM, activity intolerance, impaired physical strength and pain with function   Functional limitations: carrying objects, lifting, sleeping, pulling, pushing, reaching behind back, reaching overhead and unable to perform repetitive tasks   Assessment details: Pt reports to therapy w/ complaints of R shoulder pain. Pt presents w/ decreased ROM, decreased strength, and pain. Pt symptoms are  consistent w/ labrum pathology. Pt has increased perceived deficits described by QuickDASH. Pt has been educated on their HEP as well as shoulder anatomy regarding their pain. Pt has currently been limited in performing reaching and lifting tasks due to increased shoulder pain. It is recommended pt has further workup on their R shoulder due to chronicity of pain and presentation today. Pt also has B shoulder masses, which may need further work up. 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       Prognosis: good    Goals  Plan Goals: SHOULDER  PROBLEMS:     1. The patient has limited ROM of the R shoulder.    LTG 1: 12 weeks:  The patient will demonstrate 145 degrees of R shoulder flexion, 145 degrees of shoulder abduction, to T3 with overhead external rotation, and to T12 with behind their back internal rotation to allow the patient to perform all dressing tasks without restrictions.    STATUS:  New   STG 1a: 6 weeks:  The patient will demonstrate 120 degrees of R shoulder flexion, 120 degrees of shoulder abduction, to C5 with overhead external rotation, and to L4 with behind their back internal rotation to allow the patient to perform all dressing tasks without restrictions.    STATUS:  New   TREATMENT: Manual therapy, therapeutic exercise, home exercise instruction, and modalities as needed to include: electrical stimulation, moist heat, and ice.    2. The patient has limited strength of the R shoulder.   LTG 2: 12 weeks:  The patient will demonstrate 5 /5 strength for R shoulder flexion, abduction, external rotation, and internal rotation in order to demonstrate improved shoulder stability.    STATUS:  New   STG 2a: 6 weeks:  The patient will demonstrate 4+ /5 strength for R shoulder flexion, abduction, external rotation, and internal  rotation.    STATUS:  New   STG2b:  6 weeks:  The patient will be independent with home exercises.     STATUS:  New   TREATMENT: Manual therapy, therapeutic exercise, home exercise instruction, and modalities as needed to include: electrical stimulation, moist heat, and ice.     3. The patient complains of pain to the R shoulder.   LTG 3:12 weeks:  The patient will report a pain rating of 0 /10 or better in order to improve sleep quality and tolerance to performance of activities of daily living.    STATUS:  New   STG 3a: 6 weeks:  The patient will report a pain rating of 2 /10 or better.     STATUS:  New   TREATMENT: Manual therapy, therapeutic exercise, home exercise instruction, and modalities as needed to include: electrical stimulation, moist heat, and ice.    4. Carrying, Moving, and Handling Objects Functional Limitation     LTG 4: 12 weeks:  The patient will demonstrate 5 % limitation by achieving a score of 13 on the QuickDASH.    STATUS:  New   STG 4a: 6 weeks:  The patient will demonstrate 20 % limitation by achieving a score of 20 on the QuickDASH.      STATUS:  New   TREATMENT:  Manual therapy, therapeutic exercise, home exercise instruction, and modalities as needed to include: moist heat and electrical stimulation.            PLAN:  Therapy options: will receive skilled therapy services  Planned modality interventions: Cryotherapy and Heat  Planned therapy interventions:ADL retraining, soft tissue mobilization, strengthening, stretching, therapeutic activities, manual therapy, joint mobilization, home exercise program/patient education, functional ROM exercises, flexibility, body mechanics training, postural training, and neuromuscular re-education  Frequency: 2x per week  Duration in weeks: 12  Treatment plan discussed with: patient      Visit Diagnoses:    ICD-10-CM ICD-9-CM   1. Chronic right shoulder pain  M25.511 719.41    G89.29 338.29   2. Decreased ROM of right shoulder  M25.611 719.51   3.  Muscle weakness of right arm  M62.81 728.87       History # of Personal Factors and/or Comorbidities: MODERATE (1-2)  Examination of Body System(s): # of elements: LOW (1-2)  Clinical Presentation: STABLE   Clinical Decision Making: LOW       Timed:         Manual Therapy:    0     mins  23333;     Therapeutic Exercise:    25     mins  16572;     Neuromuscular Josh:    0    mins  18648;    Therapeutic Activity:     0     mins  42499;     Gait Trainin     mins  74105;     Ultrasound:     0     mins  46154;    Ionto                               0    mins   82089  Self Care                       0     mins   02214  Canalith Repos    0     mins 87146      Un-Timed:  Electrical Stimulation:    0     mins  79985 ( );  Dry Needling     0     mins self-pay  Traction     0     mins 37697  Low Eval     15     Mins  64672  Mod Eval     0     Mins  14454  High Eval                       0     Mins  24788  Re-Eval                           0    mins  27041    Timed Treatment:   25   mins   Total Treatment:     40   mins    PT SIGNATURE: Mauricio Valentine PT, DPT    Electronically signed  3/6/2025    [place] License: PT - [id number]    Initial Certification  Certification Period: 3/6/2025 thru 6/3/2025  I certify that the therapy services are furnished while this patient is under my care.  The services outlined above are required by this patient, and will be reviewed every 90 days.     PHYSICIAN: Jak Colindres APRN  NPI: [id number]      DATE:     Please sign and return via fax to [phone]. Thank you, [facility].

## 2025-03-10 NOTE — PROGRESS NOTES
Chief complaint/Reason for consult: T1DM    Consult requested by MALCOM Farias     HPI: Mr. Hunter is a 44yoM with T1DM. hyperlipidemia and hypothyroidism who comes for consultation of diabetes.      # [id number], Uncontrolled due to hyperglycemia  without complications  - Diagnosed: 2000 when he was admitted to the hospital with DKA   - Current regimen includes: Tresiba (U-200) 44u daily and Novolog 1:10 CHO with ISF 20 (corrects over 120mg/dl)   - Compliance with medications is fair  - HbA1c: 7.60% done 12/27/2024  - Had a CGM but there were fill issues from his pharmacy so has been off it a while   - Checks FSBG 4 times per day   - Reports fasting glucose is typically mid 100s and post prandial glucoses are more variable   - Hypoglycemia occurs rarely; typically if he takes too much insulin   - Patient has symptoms with lows   - Hyperglycemia occurs worse with stress and poor diet   - Patient denies neuropathy   - Patient denies gastroparesis   - Patient reports rotating injection sites   - Patient without known ASCVD   - taking ACEi/ARB; blood pressure today 144/86    DM Health Maintenance:  Ophtho: 3/2024; retinal screening was negative   Monofilament / Foot exam: due   Lipids/Statin: taking a statin with last FLP showing  done 12/27/2025   ZENAIDA: negative done 12/27/2025   TSH: 6.680 done 12/27/2025; takes euthyrox 25mcg daily   Aspirin: not taking    Past medical history, past surgical history, family history and social history reviewed within this encounter.     Review of Systems   Constitutional:  Positive for fatigue. Negative for activity change.   HENT:  Positive for sinus pressure.    Eyes:  Negative for visual disturbance.   Respiratory:  Negative for cough.    Cardiovascular:  Negative for chest pain.   Gastrointestinal:  Negative for abdominal pain.   Endocrine: Negative for cold intolerance and heat intolerance.   Genitourinary:  Negative for dysuria.   Musculoskeletal:  Negative for gait  "problem.   Skin:  Negative for rash.   Neurological:  Negative for numbness.   Psychiatric/Behavioral:  Negative for agitation.         /86 (BP Location: Left arm, Patient Position: Sitting, Cuff Size: Adult)   Pulse 87   Ht 185.4 cm (73\")   Wt 103 kg (228 lb)   SpO2 96%   BMI 30.08 kg/m²      Physical Exam  Vitals reviewed.   Constitutional:       General: He is not in acute distress.     Appearance: He is obese.   HENT:      Head: Normocephalic.      Nose: Nose normal.   Eyes:      Conjunctiva/sclera: Conjunctivae normal.   Cardiovascular:      Rate and Rhythm: Normal rate.      Pulses: Normal pulses.           Dorsalis pedis pulses are 2+ on the right side and 2+ on the left side.   Pulmonary:      Effort: Pulmonary effort is normal.   Abdominal:      Tenderness: There is no guarding.   Musculoskeletal:         General: No deformity.      Right foot: Prominent metatarsal heads present. No bunion.      Left foot: Prominent metatarsal heads present. No bunion.        Feet:    Feet:      Right foot:      Protective Sensation: 8 sites tested.  8 sites sensed.      Skin integrity: Skin integrity normal.      Toenail Condition: Right toenails are abnormally thick.      Left foot:      Protective Sensation: 8 sites tested.  8 sites sensed.      Skin integrity: Skin integrity normal.      Toenail Condition: Left toenails are normal.      Comments: Prominent metatarsal heads bilaterally with hammertoes, thickened right great toenail, normal sensation to monofilament bilaterally  Skin:     General: Skin is warm and dry.   Neurological:      Mental Status: He is alert and oriented to person, place, and time.   Psychiatric:         Mood and Affect: Mood normal.        Labs and images reviewed as noted in the HPI    Assessment and plan:    Diagnoses and all orders for this visit:    1. Type 1 diabetes mellitus with hyperglycemia (Primary)  Assessment & Plan:  -Uncontrolled due to hyperglycemia  -No known " complications  -Continue Tresiba (U-200) 44u daily and Novolog 1:10 CHO with ISF 20   -Resume CGM  -Will bring back in a couple weeks to review CGM, repeat HbA1c and make adjustments at that time  -Increase lisinopril dose; blood pressure today 144/86    DM Health Maintenance:  Ophtho: 3/2024; retinal screening was negative   Monofilament / Foot exam: Completed today as noted above  Lipids/Statin: Continue statin with last FLP showing  done 12/27/2025   ZENAIDA: negative done 12/27/2025   TSH: 6.680 done 12/27/2025; takes euthyrox 25mcg daily   Aspirin: not taking      Orders:  -     CBC (No Diff)  -     Microalbumin / Creatinine Urine Ratio -  -     Continuous Glucose Sensor (FreeStyle Nadine 3 Plus Sensor); Use Every 15 (Fifteen) Days.  Dispense: 6 each; Refill: 3  -     Insulin Degludec FlexTouch 200 UNIT/ML solution pen-injector; Inject 44 Units under the skin into the appropriate area as directed Daily. Titrate for a max daily dose of 50u  Dispense: 30 mL; Refill: 3  -     insulin aspart (NovoLOG FlexPen) 100 UNIT/ML solution pen-injector sc pen; Inject 1:10 CHO and 1:20 > 130mg/dl; max daily dose 50u  Dispense: 45 mL; Refill: 3  -     Insulin Pen Needle 32G X 4 MM misc; Use with insulin as direct up to 5 times per day  Dispense: 400 each; Refill: 1  -     C-Peptide    2. Pure hypercholesterolemia  Assessment & Plan:  -Uncontrolled  -Check fasting lipid panel today  -Tolerating simvastatin so we will increase the dose as needed to control lipids    Orders:  -     Lipid Panel    3. Acquired hypothyroidism  Assessment & Plan:  -Check TSH, FT4 and TPO antibodies  -Currently on very low dose of Euthyrox, will adjust dose as needed; if levels are normal we may consider stopping it    Orders:  -     TSH  -     T4, Free  -     Thyroid Peroxidase Antibody    4. Essential hypertension  Assessment & Plan:  -Uncontrolled, increase lisinopril to 5 mg daily    Orders:  -     Comprehensive Metabolic Panel  -     lisinopril  (PRINIVIL,ZESTRIL) 5 MG tablet; Take 1 tablet by mouth Daily.  Dispense: 90 tablet; Refill: 2    5. Primary hypertension         Return in about 3 weeks (around 4/2/2025) for T1DM.     Please note that portions of this note may have been completed with a voice recognition program. Efforts were made to edit the dictations, but occasionally words are mistranscribed.     Electronically signed by: Kyle S Rosenstein, MD   Addendum  Labs 3/12/2025  CMP remarkable for serum glucose of 65 mg/dL  C-peptide <0.1, consistent with T1DM  TSH 3.150, FT4 0.99, TPO antibodies negative; can continue Euthyrox 25 mcg daily for now, will discuss stopping it and monitoring labs at follow-up  Total cholesterol 175, HDL 46,  and triglycerides 52, will increase simvastatin to 20 mg daily  Urinemicroalbumin negative  [facility]

## 2025-03-12 ENCOUNTER — OFFICE VISIT (OUTPATIENT)
Dept: ENDOCRINOLOGY | Facility: CLINIC | Age: 45
End: 2025-03-12
Payer: COMMERCIAL

## 2025-03-12 ENCOUNTER — TELEPHONE (OUTPATIENT)
Dept: ENDOCRINOLOGY | Facility: CLINIC | Age: 45
End: 2025-03-12
Payer: COMMERCIAL

## 2025-03-12 VITALS
OXYGEN SATURATION: 96 % | BODY MASS INDEX: 30.22 KG/M2 | WEIGHT: 228 LBS | SYSTOLIC BLOOD PRESSURE: 144 MMHG | HEIGHT: 73 IN | DIASTOLIC BLOOD PRESSURE: 86 MMHG | HEART RATE: 87 BPM

## 2025-03-12 DIAGNOSIS — E03.9 ACQUIRED HYPOTHYROIDISM: ICD-10-CM

## 2025-03-12 DIAGNOSIS — E78.5 HYPERLIPIDEMIA, UNSPECIFIED HYPERLIPIDEMIA TYPE: ICD-10-CM

## 2025-03-12 DIAGNOSIS — E10.65 TYPE 1 DIABETES MELLITUS WITH HYPERGLYCEMIA: ICD-10-CM

## 2025-03-12 DIAGNOSIS — E78.00 PURE HYPERCHOLESTEROLEMIA: ICD-10-CM

## 2025-03-12 DIAGNOSIS — I10 ESSENTIAL HYPERTENSION: ICD-10-CM

## 2025-03-12 DIAGNOSIS — I10 PRIMARY HYPERTENSION: ICD-10-CM

## 2025-03-12 DIAGNOSIS — E10.65 TYPE 1 DIABETES MELLITUS WITH HYPERGLYCEMIA: Primary | ICD-10-CM

## 2025-03-12 RX ORDER — HYDROCHLOROTHIAZIDE 12.5 MG/1
CAPSULE ORAL
Qty: 6 EACH | Refills: 3 | Status: SHIPPED | OUTPATIENT
Start: 2025-03-12 | End: 2025-03-13 | Stop reason: SDUPTHER

## 2025-03-12 RX ORDER — LISINOPRIL 5 MG/1
5 TABLET ORAL DAILY
Qty: 90 TABLET | Refills: 2 | Status: SHIPPED | OUTPATIENT
Start: 2025-03-12

## 2025-03-12 RX ORDER — INSULIN DEGLUDEC 200 U/ML
44 INJECTION, SOLUTION SUBCUTANEOUS DAILY
Qty: 30 ML | Refills: 3 | Status: SHIPPED | OUTPATIENT
Start: 2025-03-12

## 2025-03-12 RX ORDER — INSULIN ASPART 100 [IU]/ML
INJECTION, SOLUTION INTRAVENOUS; SUBCUTANEOUS
Qty: 45 ML | Refills: 3 | Status: SHIPPED | OUTPATIENT
Start: 2025-03-12

## 2025-03-12 NOTE — ASSESSMENT & PLAN NOTE
-Check TSH, FT4 and TPO antibodies  -Currently on very low dose of Euthyrox, will adjust dose as needed; if levels are normal we may consider stopping it

## 2025-03-12 NOTE — ASSESSMENT & PLAN NOTE
-Uncontrolled  -Check fasting lipid panel today  -Tolerating simvastatin so we will increase the dose as needed to control lipids

## 2025-03-12 NOTE — TELEPHONE ENCOUNTER
PATIENT ADVISED HE WAS TOLD TO CALL BACK TO DR ROSENSTEIN'S OFFICE. PATIENT STATED HE DOES NOT KNOW HE WAS TOLD TO CALL BACK.

## 2025-03-12 NOTE — ASSESSMENT & PLAN NOTE
-Uncontrolled due to hyperglycemia  -No known complications  -Continue Tresiba (U-200) 44u daily and Novolog 1:10 CHO with ISF 20   -Resume CGM  -Will bring back in a couple weeks to review CGM, repeat HbA1c and make adjustments at that time  -Increase lisinopril dose; blood pressure today 144/86    DM Health Maintenance:  Ophtho: 3/2024; retinal screening was negative   Monofilament / Foot exam: Completed today as noted above  Lipids/Statin: Continue statin with last FLP showing  done 12/27/2025   ZENAIDA: negative done 12/27/2025   TSH: 6.680 done 12/27/2025; takes euthyrox 25mcg daily   Aspirin: not taking

## 2025-03-13 LAB
ALBUMIN SERPL-MCNC: 4.4 G/DL (ref 4.1–5.1)
ALBUMIN/CREAT UR: <5 MG/G CREAT (ref 0–29)
ALP SERPL-CCNC: 95 IU/L (ref 44–121)
ALT SERPL-CCNC: 21 IU/L (ref 0–44)
AST SERPL-CCNC: 20 IU/L (ref 0–40)
BILIRUB SERPL-MCNC: 0.6 MG/DL (ref 0–1.2)
BUN SERPL-MCNC: 8 MG/DL (ref 6–24)
BUN/CREAT SERPL: 8 (ref 9–20)
C PEPTIDE SERPL-MCNC: <0.1 NG/ML (ref 1.1–4.4)
CALCIUM SERPL-MCNC: 9.8 MG/DL (ref 8.7–10.2)
CHLORIDE SERPL-SCNC: 101 MMOL/L (ref 96–106)
CHOLEST SERPL-MCNC: 175 MG/DL (ref 100–199)
CO2 SERPL-SCNC: 24 MMOL/L (ref 20–29)
CREAT SERPL-MCNC: 1.01 MG/DL (ref 0.76–1.27)
CREAT UR-MCNC: 60.3 MG/DL
EGFRCR SERPLBLD CKD-EPI 2021: 93 ML/MIN/1.73
ERYTHROCYTE [DISTWIDTH] IN BLOOD BY AUTOMATED COUNT: 12.4 % (ref 11.6–15.4)
GLOBULIN SER CALC-MCNC: 2.5 G/DL (ref 1.5–4.5)
GLUCOSE SERPL-MCNC: 65 MG/DL (ref 70–99)
HCT VFR BLD AUTO: 52.4 % (ref 37.5–51)
HDLC SERPL-MCNC: 46 MG/DL
HGB BLD-MCNC: 17.3 G/DL (ref 13–17.7)
LDLC SERPL CALC-MCNC: 119 MG/DL (ref 0–99)
MCH RBC QN AUTO: 29.8 PG (ref 26.6–33)
MCHC RBC AUTO-ENTMCNC: 33 G/DL (ref 31.5–35.7)
MCV RBC AUTO: 90 FL (ref 79–97)
MICROALBUMIN UR-MCNC: <3 UG/ML
PLATELET # BLD AUTO: 332 X10E3/UL (ref 150–450)
POTASSIUM SERPL-SCNC: 4.1 MMOL/L (ref 3.5–5.2)
PROT SERPL-MCNC: 6.9 G/DL (ref 6–8.5)
RBC # BLD AUTO: 5.8 X10E6/UL (ref 4.14–5.8)
SODIUM SERPL-SCNC: 141 MMOL/L (ref 134–144)
T4 FREE SERPL-MCNC: 0.99 NG/DL (ref 0.82–1.77)
THYROPEROXIDASE AB SERPL-ACNC: 13 IU/ML (ref 0–34)
TRIGL SERPL-MCNC: 52 MG/DL (ref 0–149)
TSH SERPL DL<=0.005 MIU/L-ACNC: 3.15 UIU/ML (ref 0.45–4.5)
VLDLC SERPL CALC-MCNC: 10 MG/DL (ref 5–40)
WBC # BLD AUTO: 6.6 X10E3/UL (ref 3.4–10.8)

## 2025-03-13 RX ORDER — LEVOTHYROXINE SODIUM 25 UG/1
25 TABLET ORAL DAILY
Qty: 90 TABLET | Refills: 0 | Status: SHIPPED | OUTPATIENT
Start: 2025-03-13

## 2025-03-13 RX ORDER — SIMVASTATIN 20 MG
20 TABLET ORAL NIGHTLY
Qty: 90 TABLET | Refills: 0 | Status: SHIPPED | OUTPATIENT
Start: 2025-03-13

## 2025-03-13 RX ORDER — HYDROCHLOROTHIAZIDE 12.5 MG/1
CAPSULE ORAL
Qty: 2 EACH | Refills: 6 | Status: SHIPPED | OUTPATIENT
Start: 2025-03-13

## 2025-03-13 NOTE — TELEPHONE ENCOUNTER
Pt returned call and could not get the Nadine due to cost being $200. I told him I would call [facility] and find out what needs to be done.    He stated it was ok to leave a message if he did not answer.

## 2025-03-13 NOTE — TELEPHONE ENCOUNTER
Called the [facility] and spoke with David. He stated there was a paid claim for $200 for a 90 day supply..    I called the pt and he would like to have 30 day supply sent to [facility].    Last office visit with prescribing clinician: 3/12/2025       Next office visit with prescribing clinician: 4/2/2025     {

## 2025-03-14 ENCOUNTER — TREATMENT (OUTPATIENT)
Dept: PHYSICAL THERAPY | Facility: CLINIC | Age: 45
End: 2025-03-14
Payer: COMMERCIAL

## 2025-03-14 DIAGNOSIS — G89.29 CHRONIC RIGHT SHOULDER PAIN: Primary | ICD-10-CM

## 2025-03-14 DIAGNOSIS — M25.611 DECREASED ROM OF RIGHT SHOULDER: ICD-10-CM

## 2025-03-14 DIAGNOSIS — M62.81 MUSCLE WEAKNESS OF RIGHT ARM: ICD-10-CM

## 2025-03-14 DIAGNOSIS — M25.511 CHRONIC RIGHT SHOULDER PAIN: Primary | ICD-10-CM

## 2025-03-14 NOTE — PROGRESS NOTES
Outpatient Physical Therapy                   Physical Therapy Daily Treatment Note    Patient: George Hunter   : 1980  Diagnosis/ICD-10 Code:  Chronic right shoulder pain [M25.511, G89.29]  Referring practitioner: MALCOM Farias  Date of Initial Visit: Type: THERAPY  Noted: 3/6/2025  Today's Date: 3/14/2025  Patient seen for 2 sessions             Subjective   George Hunter reports: his shoulder is really stiff this morning. He thinks he may have slept on it wrong last night. Pt complains of sharp shooting pains in a U shape around the deltoid.     Objective     See Exercise, Manual, and Modality Logs for complete treatment.     Assessment/Plan  George is just beginning care to attend to deficits outlined in evaluation, requiring further therapist directed strengthening. Assess how patient tolerated treatment next session.     Progress per Plan of Care      Timed:  Manual Therapy:    15     mins  89622;  Therapeutic Exercise:    10     mins  92334;     Neuromuscular Josh:    0    mins  57131;    Therapeutic Activity:     0     mins  28140;     Self care:                       0     mins  00428;  Gait Trainin     mins  01544;       Ultrasound:                    0     mins  30844;      Untimed:  Mechanical Traction:    0     mins  95689;   Electrical Stimulation:    0     mins  48468 ( );      Timed Treatment:   25   mins   Total Treatment:     25   mins      Electronically signed:   Maddie Beavers PTA  Physical Therapist Assistant  [facility] License #: [id number]

## 2025-03-17 ENCOUNTER — TELEPHONE (OUTPATIENT)
Dept: ENDOCRINOLOGY | Facility: CLINIC | Age: 45
End: 2025-03-17
Payer: COMMERCIAL

## 2025-03-17 NOTE — TELEPHONE ENCOUNTER
PATIENT STATES HE IS RETURNING A CALL BACK FROM [facility]. HIS PHONE NUMBER [phone]    Griseofulvin Counseling:  I discussed with the patient the risks of griseofulvin including but not limited to photosensitivity, cytopenia, liver damage, nausea/vomiting and severe allergy.  The patient understands that this medication is best absorbed when taken with a fatty meal (e.g., ice cream or french fries).

## 2025-03-20 ENCOUNTER — TELEMEDICINE (OUTPATIENT)
Dept: FAMILY MEDICINE CLINIC | Facility: TELEHEALTH | Age: 45
End: 2025-03-20
Payer: COMMERCIAL

## 2025-03-20 ENCOUNTER — TREATMENT (OUTPATIENT)
Dept: PHYSICAL THERAPY | Facility: CLINIC | Age: 45
End: 2025-03-20
Payer: COMMERCIAL

## 2025-03-20 DIAGNOSIS — G89.29 CHRONIC RIGHT SHOULDER PAIN: Primary | ICD-10-CM

## 2025-03-20 DIAGNOSIS — M25.511 CHRONIC RIGHT SHOULDER PAIN: Primary | ICD-10-CM

## 2025-03-20 DIAGNOSIS — M25.611 DECREASED ROM OF RIGHT SHOULDER: ICD-10-CM

## 2025-03-20 DIAGNOSIS — M62.81 MUSCLE WEAKNESS OF RIGHT ARM: ICD-10-CM

## 2025-03-20 DIAGNOSIS — J06.9 VIRAL UPPER RESPIRATORY TRACT INFECTION: Primary | ICD-10-CM

## 2025-03-20 RX ORDER — SIMVASTATIN 10 MG
10 TABLET ORAL NIGHTLY
COMMUNITY
Start: 2025-03-13

## 2025-03-20 NOTE — PROGRESS NOTES
Physical Therapy Daily Treatment Note  [place] [facility]: [address] [facility] [place] [address]      Patient: George Hunter   : 1980  Diagnosis/ICD-10 Code:  Chronic right shoulder pain [M25.511, G89.29]  Referring practitioner: MALCOM Farias  Date of Initial Visit: Type: THERAPY  Noted: 3/6/2025  Encounter Date:  3/20/2025  Patient seen for 3 sessions           Subjective   George Hunter reported they have been more sore in their R shoulder lately. Pt reports they have tried to do some of their HEP, though this has been irritating their R shoulder. Pt notes they had really bad pain on Wednesday.     Objective   See Exercise, Manual, and Modality Logs for complete treatment.     Assessment/Plan  Pt continues to have higher levels of pain w/ PROM as well as exs today. Recommended pt follow up w/ their PCP for further imaging on their R shoulder due to persisting pain and limited tolerance to therapy at this time. Therapy will be progressed to pt's tolerance.        Timed:  Manual Therapy:    8     mins  39234;  Therapeutic Exercise:    15     mins  61541;     Neuromuscular Josh:   0    mins  01676;    Therapeutic Activity:     0     mins  06758;     Gait Trainin     mins  22598;     Aquatics                         0      mins  89842  Self care    8 mins 62057    Un-timed:  Mechanical Traction      0     mins  70780  Electrical Stimulation:    0     mins  43049 ( );      Timed Treatment:   31   mins   Total Treatment:     31   mins    Mauricio Valentine PT, DPT    Electronically signed 3/20/2025    [place] License: PT - [id number]

## 2025-03-20 NOTE — LETTER
March 20, 2025     Patient: George Hunter   YOB: 1980   Date of Visit: 3/20/2025       To Whom It May Concern:    It is my medical opinion that George Hunter may return to work tomorrow on 3/21/2025.            Sincerely,    MALCOM Morfin    CC: No Recipients

## 2025-03-20 NOTE — PROGRESS NOTES
Subjective   Chief Complaint   Patient presents with    URI       George Hunter is a 44 y.o. male.     History of Present Illness  Patient reports low-grade fever, fatigue, congestion, mild cough and mild bodyaches that started 2 days ago.  He needs a note for work today.  He has leftover previously prescribed cough medicine to take if needed.  URI   This is a new problem. Episode onset: 2 days. The problem has been unchanged. Maximum temperature: low grade. Associated symptoms include congestion and coughing. Pertinent negatives include no chest pain, diarrhea, nausea, sore throat, vomiting or wheezing.        Allergies   Allergen Reactions    Penicillins Anaphylaxis     Childhood allergy      Codeine Itching       Past Medical History:   Diagnosis Date    Allergic     Allergies     Anxiety     Back injury     Broken bones     Deafness     Diabetes     Diabetes mellitus type I     Foot pain, bilateral 2018    Fracture     Hammertoe     Head injury     Hernia cerebri     Hyperlipidemia     Hypothyroidism     Neuropathy in diabetes 2009    Plantar fascial fibromatosis of both feet 2018    Toe pain, right        Past Surgical History:   Procedure Laterality Date    COLONOSCOPY  2017    HERNIA REPAIR      TOENAIL EXCISION  2021       Social History     Socioeconomic History    Marital status:     Number of children: 1   Tobacco Use    Smoking status: Former     Average packs/day: 0.5 packs/day for 14.3 years (7.1 ttl pk-yrs)     Types: Cigarettes, Cigars     Start date: 7/23/1997     Passive exposure: Past    Smokeless tobacco: Never    Tobacco comments:     Quit 2011   Vaping Use    Vaping status: Never Used   Substance and Sexual Activity    Alcohol use: Not Currently    Drug use: Never    Sexual activity: Yes     Partners: Female       Family History   Family history unknown: Yes         Current Outpatient Medications:     simvastatin (ZOCOR) 10 MG tablet, Take 1 tablet by mouth Every Night., Disp: , Rfl:      Continuous Glucose Sensor (FreeStyle Nadine 3 Plus Sensor), Use Every 15 (Fifteen) Days., Disp: 2 each, Rfl: 6    cyclobenzaprine (FLEXERIL) 10 MG tablet, Take 1 tablet by mouth 2 (Two) Times a Day As Needed for Muscle Spasms., Disp: 60 tablet, Rfl: 0    Euthyrox 25 MCG tablet, Take 1 tablet by mouth Daily., Disp: 90 tablet, Rfl: 0    fluticasone (FLONASE) 50 MCG/ACT nasal spray, Administer 2 sprays into the nostril(s) as directed by provider Daily., Disp: , Rfl:     insulin aspart (NovoLOG FlexPen) 100 UNIT/ML solution pen-injector sc pen, Inject 1:10 CHO and 1:20 > 130mg/dl; max daily dose 50u, Disp: 45 mL, Rfl: 3    Insulin Degludec FlexTouch 200 UNIT/ML solution pen-injector, Inject 44 Units under the skin into the appropriate area as directed Daily. Titrate for a max daily dose of 50u, Disp: 30 mL, Rfl: 3    Insulin Pen Needle 32G X 4 MM misc, Use with insulin as direct up to 5 times per day, Disp: 400 each, Rfl: 1    lisinopril (PRINIVIL,ZESTRIL) 5 MG tablet, Take 1 tablet by mouth Daily., Disp: 90 tablet, Rfl: 2    montelukast (Singulair) 10 MG tablet, Take 1 tablet by mouth Every Night., Disp: 30 tablet, Rfl: 0    omeprazole (priLOSEC) 40 MG capsule, TAKE 1 CAPSULE BY MOUTH ONCE DAILY BEFORE A MEAL, Disp: 90 capsule, Rfl: 0    ondansetron ODT (ZOFRAN-ODT) 4 MG disintegrating tablet, Place 1 tablet on the tongue 4 (Four) Times a Day As Needed for Nausea or Vomiting., Disp: 20 tablet, Rfl: 0    simvastatin (ZOCOR) 20 MG tablet, Take 1 tablet by mouth Every Night., Disp: 90 tablet, Rfl: 0      Review of Systems   Constitutional:  Positive for activity change, fatigue and fever.   HENT:  Positive for congestion. Negative for sore throat.    Respiratory:  Positive for cough. Negative for chest tightness, shortness of breath and wheezing.    Cardiovascular:  Negative for chest pain.   Gastrointestinal:  Negative for diarrhea, nausea and vomiting.   Musculoskeletal:  Positive for myalgias.   Neurological:  Positive  for headache.        There were no vitals filed for this visit.    Objective   Physical Exam  Constitutional:       General: He is not in acute distress.     Appearance: Normal appearance. He is not ill-appearing, toxic-appearing or diaphoretic.   HENT:      Head: Normocephalic.      Nose: Congestion present.      Mouth/Throat:      Lips: Pink.      Mouth: Mucous membranes are moist.   Pulmonary:      Effort: Pulmonary effort is normal.   Neurological:      Mental Status: He is alert and oriented to person, place, and time.          Procedures     Assessment & Plan   Diagnoses and all orders for this visit:    1. Viral upper respiratory tract infection (Primary)            PLAN: Discussed dosing, side effects, recommended other symptomatic care.  Patient should follow up with primary care provider, Urgent Care or ER if symptoms worsen, fail to resolve or other symptoms need attention. Patient/family agree to the above.         MALCOM Morfin     Mode of Visit: Video  Location of patient: -HOME-  Location of provider: +HOME+  You have chosen to receive care through a telehealth visit.  The patient has signed the video visit consent form.  The visit included audio and video interaction. No technical issues occurred during this visit.    The use of a video visit has been reviewed with the patient and verbal informed consent has been obtained. Myself and George Hunter participated in this visit. The patient is located at [address] [place] [address] [place] [address]. I am located in [place], [place]. Mychart and Zoom were utilized.        This visit was performed via Telehealth.  This patient has been instructed to follow-up with their primary care provider if their symptoms worsen or the treatment provided does not resolve their illness.

## 2025-03-21 DIAGNOSIS — M25.511 ACUTE PAIN OF RIGHT SHOULDER: Primary | ICD-10-CM

## 2025-03-27 ENCOUNTER — TREATMENT (OUTPATIENT)
Dept: PHYSICAL THERAPY | Facility: CLINIC | Age: 45
End: 2025-03-27
Payer: COMMERCIAL

## 2025-03-27 DIAGNOSIS — M62.81 MUSCLE WEAKNESS OF RIGHT ARM: ICD-10-CM

## 2025-03-27 DIAGNOSIS — M25.511 CHRONIC RIGHT SHOULDER PAIN: Primary | ICD-10-CM

## 2025-03-27 DIAGNOSIS — G89.29 CHRONIC RIGHT SHOULDER PAIN: Primary | ICD-10-CM

## 2025-03-27 DIAGNOSIS — M25.611 DECREASED ROM OF RIGHT SHOULDER: ICD-10-CM

## 2025-03-27 NOTE — PROGRESS NOTES
Physical Therapy Daily Treatment Note  [place] [facility]: [address] [facility] [place] [address]      Patient: George Hunter   : 1980  Diagnosis/ICD-10 Code:  Chronic right shoulder pain [M25.511, G89.29]  Referring practitioner: MALCOM Farias  Date of Initial Visit: Type: THERAPY  Noted: 3/6/2025  Encounter Date:  3/27/2025  Patient seen for 4 sessions           Subjective   George Hunter reported their shoulder pain feels about the same in their R shoulder at this time. Pt does have an MRI scheduled.     Objective   PROM R shoulder:   Flex: 132 deg   ABD: 115 deg   ER: 60 deg       See Exercise, Manual, and Modality Logs for complete treatment.     Assessment/Plan  Pt does have a firm endfeel w/ manual therapy today. Pt's overall measurements have improved passively since start of therapy, though on the initial session, they did have a lot of guarding. Patient will continue to benefit from skilled physical therapy to further address their deficits in strength and ROM in order to improve upon their functional mobility and to return to ADLs w/ greater ease.          Timed:  Manual Therapy:    15     mins  53265;  Therapeutic Exercise:    10     mins  92193;     Neuromuscular Josh:   0    mins  18823;    Therapeutic Activity:     0     mins  81734;     Gait Trainin     mins  91941;     Aquatics                         0      mins  06460    Un-timed:  Mechanical Traction      0     mins  85427  Electrical Stimulation:    0     mins  99105 ( );      Timed Treatment:   25   mins   Total Treatment:     25   mins    Mauricio Valentine PT, DPT    Electronically signed 3/27/2025    [place] License: PT - [id number]

## 2025-03-31 NOTE — PROGRESS NOTES
Chief complaint/Reason for consult: T1DM     HPI: Mr. Hunter is a 44yoM with T1DM. hyperlipidemia and hypothyroidism who comes for follow-up of diabetes.  He was last seen 3/12/2025 and reports since that time no significant changes in health.  Has glycemic variability.     # [id number], Uncontrolled due to hyperglycemia and hypoglycemia without complications  - Diagnosed: 2000 when he was admitted to the hospital with DKA   - Current regimen includes: Tresiba (U-200) 44u daily and Novolog 1:10 CHO with ISF 30 (corrects over 120mg/dl)   - Compliance with medications is fair  - HbA1c: 7.60% done today   - Using CGM     CGM Download  -Dates reviewed: 3/20/2025-4/2/2025  -Data: 97% wear time, average glucose 161 mg/dL, GMI 7.2%, glucose variability 36.5%, 8% very high, 25% high, 62% time in range, 4% low and 1% very low  -Interpretation: Significant glycemic variability with uptrending fasting glucose and sporadic lows during the day, appears to have too much prandial and not enough basal insulin, over correcting lows and highs     - Patient has symptoms with lows   - Hyperglycemia occurs worse with with fasting  - Patient denies neuropathy   - Patient denies gastroparesis   - Patient reports rotating injection sites   - Patient without known ASCVD   - taking ACEi/ARB; blood pressure today 126/78     DM Health Maintenance:  Ophtho: 3/2024; retinal screening was negative   Monofilament / Foot exam: done 3/12/2025    Lipids/Statin: taking a statin with last FLP showing  done 3/12/2025  ZENAIDA: negative done 3/12/2025   TSH: 3.150 / FT4 0.99 and TPO Ab negative done 3/12/2024; takes euthyrox 25mcg daily   Aspirin: not taking    # Mixed hyperlipidemia  - Labs 3/12/2025 showed , HDL 46,  and triglycerides 52 while taking simvastatin 10mg daily  - Now taking simvastatin 20mg and he reports no significant changes    Past medical history, past surgical history, family history and social history reviewed within this  "encounter.     Review of Systems   Constitutional:  Negative for activity change and unexpected weight change.   HENT:  Negative for trouble swallowing.    Eyes:  Negative for visual disturbance.   Respiratory:  Negative for shortness of breath.    Cardiovascular:  Negative for chest pain.   Gastrointestinal:  Negative for abdominal pain.   Endocrine: Negative for cold intolerance and heat intolerance.   Musculoskeletal:  Negative for gait problem.   Skin:  Negative for rash.   Neurological:  Negative for numbness.   Psychiatric/Behavioral:  Negative for agitation.         /78   Pulse 72   Ht 185.4 cm (73\")   Wt 105 kg (231 lb 14.4 oz)   SpO2 95%   BMI 30.60 kg/m²      Physical Exam  Vitals reviewed.   Constitutional:       General: He is not in acute distress.     Appearance: He is obese.   HENT:      Head: Normocephalic.      Nose: Nose normal.   Eyes:      Conjunctiva/sclera: Conjunctivae normal.   Pulmonary:      Effort: Pulmonary effort is normal.   Abdominal:      Tenderness: There is no guarding.   Musculoskeletal:         General: No deformity.   Skin:     General: Skin is warm and dry.   Neurological:      Mental Status: He is alert and oriented to person, place, and time.   Psychiatric:         Mood and Affect: Mood normal.        Labs and images reviewed as noted in the HPI    Assessment and plan:    Diagnoses and all orders for this visit:    1. Type 1 diabetes mellitus with hyperglycemia (Primary)  Assessment & Plan:  -Uncontrolled due to hyperglycemia and glycemia  -No known complications  -Increase Tresiba (U-200) 48u daily and decrease Novolog 1:10 CHO with ISF 40 > 160mg/dl    -Continue freestyle neal CGM  -Continue lisinopril; blood pressure today 126/78    DM Health Maintenance:  Ophtho: 3/2024; retinal screening was negative   Monofilament / Foot exam: done 3/12/2025    Lipids/Statin: taking a statin with last FLP showing  done 3/12/2025  ZENAIDA: negative done 3/12/2025   TSH: " 3.150 / FT4 0.99 and TPO Ab negative done 3/12/2024; takes euthyrox 25mcg daily   Aspirin: not taking    Orders:  -     POC Glycosylated Hemoglobin (Hb A1C)  -     insulin aspart (NovoLOG FlexPen) 100 UNIT/ML solution pen-injector sc pen; Inject 1:10 CHO and 1:40 > 160mg/dl; max daily dose 50u  Dispense: 45 mL; Refill: 3  -     Insulin Degludec FlexTouch 200 UNIT/ML solution pen-injector; Inject 48 Units under the skin into the appropriate area as directed Daily. Titrate for a max daily dose of 50u  Dispense: 30 mL; Refill: 3    2. Class 1 obesity due to excess calories with serious comorbidity and body mass index (BMI) of 30.0 to 30.9 in adult  Assessment & Plan:  -Given elevated hematocrit, history of snoring and fatigue recommend evaluation by sleep medicine for CLARI    Orders:  -     Ambulatory Referral to Sleep Medicine    3. Acquired hypothyroidism  Assessment & Plan:  -Biochemically euthyroid on Euthyrox 25 mcg daily  -TPO antibodies negative  -Given he is on a very low dose of thyroid medication with negative TPO antibodies I offered him the option of coming off thyroid hormone replacement and see if thyroid levels remain normal, he declines at this time but will consider at follow-up      4. Pure hypercholesterolemia  Assessment & Plan:  -Continue simvastatin 20 mg daily  -Check fasting lipid panel later this year      5. Essential hypertension  Assessment & Plan:  -Controlled, continue lisinopril 5 mg daily           Return in about 16 weeks (around 7/23/2025) for 1130am overbook for T1DM .     Please note that portions of this note may have been completed with a voice recognition program. Efforts were made to edit the dictations, but occasionally words are mistranscribed.     Electronically signed by: Kyle S Rosenstein, MD

## 2025-04-02 ENCOUNTER — OFFICE VISIT (OUTPATIENT)
Dept: ENDOCRINOLOGY | Facility: CLINIC | Age: 45
End: 2025-04-02
Payer: COMMERCIAL

## 2025-04-02 ENCOUNTER — TELEPHONE (OUTPATIENT)
Dept: ENDOCRINOLOGY | Facility: CLINIC | Age: 45
End: 2025-04-02
Payer: COMMERCIAL

## 2025-04-02 VITALS
DIASTOLIC BLOOD PRESSURE: 78 MMHG | HEART RATE: 72 BPM | HEIGHT: 73 IN | WEIGHT: 231.9 LBS | OXYGEN SATURATION: 95 % | BODY MASS INDEX: 30.73 KG/M2 | SYSTOLIC BLOOD PRESSURE: 126 MMHG

## 2025-04-02 DIAGNOSIS — E03.9 ACQUIRED HYPOTHYROIDISM: ICD-10-CM

## 2025-04-02 DIAGNOSIS — E10.65 TYPE 1 DIABETES MELLITUS WITH HYPERGLYCEMIA: ICD-10-CM

## 2025-04-02 DIAGNOSIS — I10 ESSENTIAL HYPERTENSION: ICD-10-CM

## 2025-04-02 DIAGNOSIS — E78.00 PURE HYPERCHOLESTEROLEMIA: ICD-10-CM

## 2025-04-02 DIAGNOSIS — E66.09 CLASS 1 OBESITY DUE TO EXCESS CALORIES WITH SERIOUS COMORBIDITY AND BODY MASS INDEX (BMI) OF 30.0 TO 30.9 IN ADULT: ICD-10-CM

## 2025-04-02 DIAGNOSIS — E66.811 CLASS 1 OBESITY DUE TO EXCESS CALORIES WITH SERIOUS COMORBIDITY AND BODY MASS INDEX (BMI) OF 30.0 TO 30.9 IN ADULT: ICD-10-CM

## 2025-04-02 DIAGNOSIS — E10.65 TYPE 1 DIABETES MELLITUS WITH HYPERGLYCEMIA: Primary | ICD-10-CM

## 2025-04-02 LAB
EXPIRATION DATE: ABNORMAL
HBA1C MFR BLD: 7.6 % (ref 4.5–5.7)
Lab: ABNORMAL

## 2025-04-02 RX ORDER — INSULIN ASPART 100 [IU]/ML
INJECTION, SOLUTION INTRAVENOUS; SUBCUTANEOUS
Qty: 45 ML | Refills: 3 | Status: SHIPPED | OUTPATIENT
Start: 2025-04-02 | End: 2025-04-02 | Stop reason: SDUPTHER

## 2025-04-02 RX ORDER — INSULIN DEGLUDEC 200 U/ML
48 INJECTION, SOLUTION SUBCUTANEOUS DAILY
Qty: 30 ML | Refills: 3 | Status: SHIPPED | OUTPATIENT
Start: 2025-04-02

## 2025-04-02 RX ORDER — INSULIN ASPART 100 [IU]/ML
INJECTION, SOLUTION INTRAVENOUS; SUBCUTANEOUS
Qty: 45 ML | Refills: 3 | Status: SHIPPED | OUTPATIENT
Start: 2025-04-02

## 2025-04-02 RX ORDER — INSULIN ASPART 100 [IU]/ML
INJECTION, SOLUTION INTRAVENOUS; SUBCUTANEOUS
Qty: 45 ML | Refills: 3 | Status: CANCELLED | OUTPATIENT
Start: 2025-04-02

## 2025-04-02 NOTE — ASSESSMENT & PLAN NOTE
-Biochemically euthyroid on Euthyrox 25 mcg daily  -TPO antibodies negative  -Given he is on a very low dose of thyroid medication with negative TPO antibodies I offered him the option of coming off thyroid hormone replacement and see if thyroid levels remain normal, he declines at this time but will consider at follow-up   The patient has been examined and the H&P has been reviewed:        Anesthesia/Surgery risks, benefits and alternative options discussed and understood by patient/family.          There are no hospital problems to display for this patient.

## 2025-04-02 NOTE — TELEPHONE ENCOUNTER
[facility] in [place], [place] called with questions pertaining to insulin aspart (NovoLOG FlexPen) 100 UNIT/ML solution pen-injector sc pen     It is not clear what the pharmacy is needing.

## 2025-04-02 NOTE — ASSESSMENT & PLAN NOTE
-Given elevated hematocrit, history of snoring and fatigue recommend evaluation by sleep medicine for CLARI

## 2025-04-03 ENCOUNTER — TREATMENT (OUTPATIENT)
Dept: PHYSICAL THERAPY | Facility: CLINIC | Age: 45
End: 2025-04-03
Payer: COMMERCIAL

## 2025-04-03 DIAGNOSIS — M25.611 DECREASED ROM OF RIGHT SHOULDER: ICD-10-CM

## 2025-04-03 DIAGNOSIS — M62.81 MUSCLE WEAKNESS OF RIGHT ARM: ICD-10-CM

## 2025-04-03 DIAGNOSIS — G89.29 CHRONIC RIGHT SHOULDER PAIN: Primary | ICD-10-CM

## 2025-04-03 DIAGNOSIS — M25.511 CHRONIC RIGHT SHOULDER PAIN: Primary | ICD-10-CM

## 2025-04-03 NOTE — PROGRESS NOTES
"Progress Note / Discharge Summary  [place] [facility] [address] [facility] [place] [address]      Patient: George Hunter   : 1980  Diagnosis/ICD-10 Code:  Chronic right shoulder pain [M25.511, G89.29]  Referring practitioner: MALCOM Farias  Date of Initial Visit: Type: THERAPY  Noted: 3/6/2025  Encounter Date:  4/3/2025  Patient seen for 5 sessions      Subjective:   Subjective Questionnaire: QuickDASH: 28  Clinical Progress: unchanged  Home Program Compliance: Yes  Treatment has included: soft tissue mobilization, strengthening, stretching, therapeutic activities, manual therapy, joint mobilization, home exercise program/patient education, functional ROM exercises, and flexibility    Subjective   George Hunter reports this last week has been worse for their R shoulder. Pt reports they have not been able to sleep well due to increased shoulder pain. Pt rates their pain as a 4-5/10. This is considered a dull, uncomfortable pain.   Pt can still have times w/ a 1-2/10 pain.       Objective   Active Range of Motion   Left Shoulder   Flexion: 145 degrees   Abduction: 125 degrees   External rotation BTH: T3   Internal rotation BTB: T12      Right Shoulder   Flexion: 97 degrees with pain  Abduction: 90 degrees with pain  External rotation BTH: C2 with pain  Internal rotation BTB: to hip with pain    \"Feels like something is there, stopping me from doing it\".      Passive Range of Motion      Right Shoulder   Flexion: 90 degrees with pain  Abduction: 90 degrees with pain  External rotation 45°: 45 degrees   Internal rotation 45°: 45 degrees      Additional Passive Range of Motion Details  Muscle guarding at top of motion.      Strength/Myotome Testing      Left Shoulder      Planes of Motion   Flexion: 5   Abduction: 5   External rotation at 0°: 5   Internal rotation at 0°: 5      Isolated Muscles   Biceps: 5   Triceps: 5      Right Shoulder      Planes of Motion   Flexion: 4   Abduction: 4+   External rotation " at 0°: 4   Internal rotation at 0°: 4+      Isolated Muscles   Biceps: 4   Triceps: 4   See Exercise, Manual, and Modality Logs for complete treatment.     Assessment/Plan  Impairments: abnormal or restricted ROM, activity intolerance, impaired physical strength and pain with function   Functional limitations: carrying objects, lifting, sleeping, pulling, pushing, reaching behind back, reaching overhead and unable to perform repetitive tasks     Pt reported to therapy w/ complaints of R shoulder pain. Pt continues to have higher levels of pain, which now is limiting their sleep over the past week. Frequency has been changed in therapy, as well as exs and manual therapy approach. Pt has regressed in therapy at this time. It is recommended pt continue to pursue further workup and to return to their PCP. Pt may also benefit from an ortho consult. Pt will be discharged at this time.     Goals  Plan Goals: SHOULDER  PROBLEMS:      1. The patient has limited ROM of the R shoulder.                  LTG 1: 12 weeks:  The patient will demonstrate 145 degrees of R shoulder flexion, 145 degrees of shoulder abduction, to T3 with overhead external rotation, and to T12 with behind their back internal rotation to allow the patient to perform all dressing tasks without restrictions.                          STATUS:  not met              STG 1a: 6 weeks:  The patient will demonstrate 120 degrees of R shoulder flexion, 120 degrees of shoulder abduction, to C5 with overhead external rotation, and to L4 with behind their back internal rotation to allow the patient to perform all dressing tasks without restrictions.                          STATUS:  not met              TREATMENT: Manual therapy, therapeutic exercise, home exercise instruction, and modalities as needed to include: electrical stimulation, moist heat, and ice.     2. The patient has limited strength of the R shoulder.              LTG 2: 12 weeks:  The patient will  demonstrate 5 /5 strength for R shoulder flexion, abduction, external rotation, and internal rotation in order to demonstrate improved shoulder stability.                          STATUS:  not met              STG 2a: 6 weeks:  The patient will demonstrate 4+ /5 strength for R shoulder flexion, abduction, external rotation, and internal rotation.                          STATUS:  not met              STG2b:  6 weeks:  The patient will be independent with home exercises.                           STATUS:  not met              TREATMENT: Manual therapy, therapeutic exercise, home exercise instruction, and modalities as needed to include: electrical stimulation, moist heat, and ice.                3. The patient complains of pain to the R shoulder.              LTG 3:12 weeks:  The patient will report a pain rating of 0 /10 or better in order to improve sleep quality and tolerance to performance of activities of daily living.                          STATUS:  not met              STG 3a: 6 weeks:  The patient will report a pain rating of 2 /10 or better.                           STATUS:  not met              TREATMENT: Manual therapy, therapeutic exercise, home exercise instruction, and modalities as needed to include: electrical stimulation, moist heat, and ice.     4. Carrying, Moving, and Handling Objects Functional Limitation                               LTG 4: 12 weeks:  The patient will demonstrate 5 % limitation by achieving a score of 13 on the QuickDASH.                          STATUS:  not met              STG 4a: 6 weeks:  The patient will demonstrate 20 % limitation by achieving a score of 20 on the QuickDASH.                            STATUS:  not met              TREATMENT:  Manual therapy, therapeutic exercise, home exercise instruction, and modalities as needed to include: moist heat and electrical stimulation.       Progress toward previous goals: Not Met      Recommendations: Discharge; return to  PCP. Recommend ortho       PT Signature: Mauricio Valentine PT, DPT    Electronically signed 4/3/2025    [place] License: PT - [id number]     Timed:  Manual Therapy:    0     mins  72433;  Therapeutic Exercise:    10     mins  38695;     Neuromuscular Josh:    0    mins  06080;    Therapeutic Activity:     0     mins  71086;     Gait Trainin     mins  95876;     Aquatics                         0      mins  72248  Self Care                        0      mins  83445    Un-timed:  Mechanical Traction      0     mins  14885  Dry Needling     0     mins self-pay  Electrical Stimulation:    0     mins  29952 ( );  Re-Eval:         8     mins  67821    Timed Treatment:   10   mins   Total Treatment:     18   mins

## 2025-04-09 ENCOUNTER — PRIOR AUTHORIZATION (OUTPATIENT)
Dept: ENDOCRINOLOGY | Facility: CLINIC | Age: 45
End: 2025-04-09
Payer: COMMERCIAL

## 2025-04-09 DIAGNOSIS — E10.65 TYPE 1 DIABETES MELLITUS WITH HYPERGLYCEMIA: Primary | ICD-10-CM

## 2025-04-09 RX ORDER — ACYCLOVIR 400 MG/1
1 TABLET ORAL
Qty: 3 EACH | Refills: 11 | Status: SHIPPED | OUTPATIENT
Start: 2025-04-09

## 2025-04-09 NOTE — TELEPHONE ENCOUNTER
Received PA request from pharmacy for Nadine.  Submitted PA to insurance and they prefer Dexcom.  Can this be changed?

## 2025-04-11 ENCOUNTER — HOSPITAL ENCOUNTER (OUTPATIENT)
Dept: MRI IMAGING | Facility: HOSPITAL | Age: 45
Discharge: HOME | End: 2025-04-11
Admitting: NURSE PRACTITIONER
Payer: COMMERCIAL

## 2025-04-11 DIAGNOSIS — M25.511 ACUTE PAIN OF RIGHT SHOULDER: ICD-10-CM

## 2025-04-11 PROCEDURE — 73221 MRI JOINT UPR EXTREM W/O DYE: CPT

## 2025-04-16 DIAGNOSIS — M25.511 ACUTE PAIN OF RIGHT SHOULDER: Primary | ICD-10-CM

## 2025-05-05 ENCOUNTER — OFFICE VISIT (OUTPATIENT)
Dept: FAMILY MEDICINE CLINIC | Facility: CLINIC | Age: 45
End: 2025-05-05
Payer: COMMERCIAL

## 2025-05-05 VITALS
HEIGHT: 73 IN | DIASTOLIC BLOOD PRESSURE: 82 MMHG | WEIGHT: 231.2 LBS | HEART RATE: 59 BPM | SYSTOLIC BLOOD PRESSURE: 132 MMHG | OXYGEN SATURATION: 95 % | BODY MASS INDEX: 30.64 KG/M2 | TEMPERATURE: 96.2 F

## 2025-05-05 DIAGNOSIS — J02.9 PHARYNGITIS, UNSPECIFIED ETIOLOGY: Primary | ICD-10-CM

## 2025-05-05 DIAGNOSIS — J30.9 ALLERGIC RHINITIS, UNSPECIFIED SEASONALITY, UNSPECIFIED TRIGGER: ICD-10-CM

## 2025-05-05 LAB
EXPIRATION DATE: NORMAL
EXPIRATION DATE: NORMAL
FLUAV AG UPPER RESP QL IA.RAPID: NOT DETECTED
FLUBV AG UPPER RESP QL IA.RAPID: NOT DETECTED
INTERNAL CONTROL: NORMAL
INTERNAL CONTROL: NORMAL
Lab: NORMAL
Lab: NORMAL
S PYO AG THROAT QL: NEGATIVE
SARS-COV-2 AG UPPER RESP QL IA.RAPID: NOT DETECTED

## 2025-05-05 PROCEDURE — 87428 SARSCOV & INF VIR A&B AG IA: CPT | Performed by: NURSE PRACTITIONER

## 2025-05-05 PROCEDURE — 87880 STREP A ASSAY W/OPTIC: CPT | Performed by: NURSE PRACTITIONER

## 2025-05-05 PROCEDURE — 99213 OFFICE O/P EST LOW 20 MIN: CPT | Performed by: NURSE PRACTITIONER

## 2025-05-05 RX ORDER — OMEPRAZOLE 40 MG/1
40 CAPSULE, DELAYED RELEASE ORAL DAILY
Qty: 90 CAPSULE | Refills: 0 | Status: SHIPPED | OUTPATIENT
Start: 2025-05-05

## 2025-05-05 RX ORDER — CHLORCYCLIZINE HYDROCHLORIDE AND PSEUDOEPHEDRINE HYDROCHLORIDE 25; 60 MG/1; MG/1
1 TABLET ORAL EVERY 8 HOURS PRN
Qty: 42 TABLET | Refills: 0 | Status: SHIPPED | OUTPATIENT
Start: 2025-05-05

## 2025-05-05 NOTE — PROGRESS NOTES
"Chief Complaint  Sore Throat    Subjective         George Hunter presents to [facility]  Patient presents to the office with complaints of a sore throat nasal congestion and sinus pressure.  Patient states that all the symptoms started this morning.  He denies any fevers or general body aches.  He does state that he has been having some ear pressure as well.  I did explain that we would swab him for COVID flu and strep.  This was all negative.  I did explain to the patient that I would place him on [place] to help dry up all the congestion.  Verbalizes understanding.  Denies any other concerns or complaints at this time    Sore Throat       Objective     Vitals:    05/05/25 0951   BP: 132/82   BP Location: Right arm   Patient Position: Sitting   Cuff Size: Adult   Pulse: 59   Temp: 96.2 °F (35.7 °C)   TempSrc: Temporal   SpO2: 95%   Weight: 105 kg (231 lb 3.2 oz)   Height: 185.4 cm (73\")      Body mass index is 30.5 kg/m².             Physical Exam  Vitals reviewed.   Constitutional:       Appearance: Normal appearance.   HENT:      Right Ear: Tympanic membrane, ear canal and external ear normal.      Left Ear: Tympanic membrane, ear canal and external ear normal.      Nose: Congestion and rhinorrhea present.      Mouth/Throat:      Mouth: Mucous membranes are moist.      Pharynx: Oropharynx is clear.   Cardiovascular:      Rate and Rhythm: Normal rate and regular rhythm.      Pulses: Normal pulses.      Heart sounds: Normal heart sounds, S1 normal and S2 normal. No murmur heard.  Pulmonary:      Effort: Pulmonary effort is normal. No respiratory distress.      Breath sounds: Normal breath sounds.   Skin:     General: Skin is warm and dry.   Neurological:      Mental Status: He is alert and oriented to person, place, and time.   Psychiatric:         Attention and Perception: Attention normal.         Mood and Affect: Mood normal.         Behavior: Behavior normal.          Result " Review :   The following data was reviewed by: MALCOM Farias on 05/05/2025:      Procedures    Assessment and Plan   Diagnoses and all orders for this visit:    1. Pharyngitis, unspecified etiology (Primary)  -     POCT SARS-CoV-2 Antigen SLIME + Flu  -     POCT rapid strep A    2. Allergic rhinitis, unspecified seasonality, unspecified trigger  -     Chlorcyclizine-Pseudoephed (Stahist AD) 25-60 MG tablet; Take 1 tablet by mouth Every 8 (Eight) Hours As Needed (congestion).  Dispense: 42 tablet; Refill: 0    Note will be provided for the patient today.  He may return to work tomorrow.  Rest increase fluids.  May use Tylenol or ibuprofen as needed for general body aches and/or fevers as directed      Follow Up   Return if symptoms worsen or fail to improve, for Next scheduled follow up.  Patient was given instructions and counseling regarding his condition or for health maintenance advice. Please see specific information pulled into the AVS if appropriate.

## 2025-05-05 NOTE — LETTER
May 5, 2025     Patient: George Hunter   YOB: 1980   Date of Visit: 5/5/2025       To Whom It May Concern:    It is my medical opinion that George Hunter may return to work on 05/06/2025.         Sincerely,        MALCOM Farias

## 2025-05-06 ENCOUNTER — PATIENT MESSAGE (OUTPATIENT)
Dept: FAMILY MEDICINE CLINIC | Facility: CLINIC | Age: 45
End: 2025-05-06
Payer: COMMERCIAL

## 2025-05-08 ENCOUNTER — OFFICE VISIT (OUTPATIENT)
Dept: ORTHOPEDIC SURGERY | Facility: CLINIC | Age: 45
End: 2025-05-08
Payer: COMMERCIAL

## 2025-05-08 VITALS
DIASTOLIC BLOOD PRESSURE: 78 MMHG | WEIGHT: 231 LBS | OXYGEN SATURATION: 93 % | BODY MASS INDEX: 30.62 KG/M2 | HEIGHT: 73 IN | SYSTOLIC BLOOD PRESSURE: 110 MMHG | HEART RATE: 95 BPM

## 2025-05-08 DIAGNOSIS — M75.01 ADHESIVE CAPSULITIS OF RIGHT SHOULDER: ICD-10-CM

## 2025-05-08 DIAGNOSIS — M75.41 IMPINGEMENT SYNDROME OF RIGHT SHOULDER: ICD-10-CM

## 2025-05-08 DIAGNOSIS — M25.511 RIGHT SHOULDER PAIN, UNSPECIFIED CHRONICITY: Primary | ICD-10-CM

## 2025-05-08 RX ORDER — LIDOCAINE HYDROCHLORIDE 10 MG/ML
5 INJECTION, SOLUTION EPIDURAL; INFILTRATION; INTRACAUDAL; PERINEURAL
Status: COMPLETED | OUTPATIENT
Start: 2025-05-08 | End: 2025-05-08

## 2025-05-08 RX ORDER — TRIAMCINOLONE ACETONIDE 40 MG/ML
40 INJECTION, SUSPENSION INTRA-ARTICULAR; INTRAMUSCULAR
Status: COMPLETED | OUTPATIENT
Start: 2025-05-08 | End: 2025-05-08

## 2025-05-08 RX ADMIN — LIDOCAINE HYDROCHLORIDE 5 ML: 10 INJECTION, SOLUTION EPIDURAL; INFILTRATION; INTRACAUDAL; PERINEURAL at 16:29

## 2025-05-08 RX ADMIN — TRIAMCINOLONE ACETONIDE 40 MG: 40 INJECTION, SUSPENSION INTRA-ARTICULAR; INTRAMUSCULAR at 16:29

## 2025-05-08 NOTE — PROGRESS NOTES
"Chief Complaint  Initial Evaluation of the Right Shoulder     Subjective      George Hunter presents to [facility] for initial evaluation of the right shoulder.  He had a MRI and is here to review.  He has had pain since last summer playing softball.  Then last fall he picked up a dog toy and threw it and felt something weird in his shoulder.  His PCP put orders in for physical therapy and then he finally started end of February.  He wasn't making progress so had a MRI of the right shoulder.      Allergies   Allergen Reactions    Penicillins Anaphylaxis     Childhood allergy      Codeine Itching        Social History     Socioeconomic History    Marital status:     Number of children: 1   Tobacco Use    Smoking status: Former     Average packs/day: 0.5 packs/day for 14.3 years (7.1 ttl pk-yrs)     Types: Cigarettes, Cigars     Start date: 7/23/1997     Passive exposure: Past    Smokeless tobacco: Never    Tobacco comments:     Quit 2011   Vaping Use    Vaping status: Never Used   Substance and Sexual Activity    Alcohol use: Not Currently    Drug use: Never    Sexual activity: Yes     Partners: Female        I reviewed the patient's chief complaint, history of present illness, review of systems, past medical history, surgical history, family history, social history, medications, and allergy list.     Review of Systems     Constitutional: Denies fevers, chills, weight loss  Cardiovascular: Denies chest pain, shortness of breath  Skin: Denies rashes, acute skin changes  Neurologic: Denies headache, loss of consciousness        Vital Signs:   /78   Pulse 95   Ht 185.4 cm (73\")   Wt 105 kg (231 lb)   SpO2 93%   BMI 30.48 kg/m²          Physical Exam  General: Alert. No acute distress    Ortho Exam        RIGHT SHOULDER Forward flexion 120 with pain. Abduction 90. External rotation 30. Internal rotation SI joint. Positive Cross body adduction. Supraspinatus strength 4/5. " Infraspinatus Strength 4+/5. Infrared subscap 4+/5. Positive Jackson. Positive Neer. Negative Apprehension. Negative Lift off. (Negative Obriens. Sensation intact to light touch, median, radial, ulnar nerve. Positive AIN, PIN, ulnar nerve motor. Positive pulses. Positive Impingement signs. Good strength in triceps, biceps, deltoid, wrist extensors and wrist flexors. Tender to palpation to the shoulder and down the arm.  Pain with empty can testing.  .        Large Joint Arthrocentesis: R subacromial bursa  Date/Time: 5/8/2025 4:29 PM  Consent given by: patient  Site marked: site marked  Timeout: Immediately prior to procedure a time out was called to verify the correct patient, procedure, equipment, support staff and site/side marked as required   Supporting Documentation  Indications: pain   Procedure Details  Location: shoulder - R subacromial bursa  Preparation: Patient was prepped and draped in the usual sterile fashion  Needle gauge: 21 G.  Medications administered: 40 mg triamcinolone acetonide 40 MG/ML; 5 mL lidocaine PF 1% 1 %  Patient tolerance: patient tolerated the procedure well with no immediate complications    This injection documentation was Scribed for Jody Hou MD by Mariela Case.  05/08/25   16:29 EDT      Imaging Results (Most Recent)       None             Result Review :         MRI Shoulder Right Without Contrast  Result Date: 4/15/2025  Narrative: MRI SHOULDER RIGHT WO CONTRAST Date of Exam: 4/11/2025 2:36 PM EDT Indication: right shoulder pain.  Comparison: Radiographs 9/27/2024 Technique:  Routine multiplanar/multisequence images of the right shoulder were obtained without contrast administration.  Findings: Rotator cuff: There is a suspected shallow bursal surface defect at the anterior supraspinatus insertion. No definite high-grade or full-thickness tendon defect is identified at this time. Rotator cuff tendons otherwise appear to be intact. No significant muscle edema  or  atrophy. Glenohumeral joint: Alignment appears within normal limits. No definite joint effusion. There appears to be mild glenohumeral osteophyte formation. There is suspected partial thickness cartilage loss of the humeral head and glenoid. There appears to be diminution of the posterior labrum suggesting chronic degenerative changes. No focal fluid signal labral defect or paralabral cyst on this exam. Biceps tendon: Long head of the biceps tendon appears normally positioned in the bicipital groove. No definite findings of tendon tear or tenosynovitis. Acromioclavicular Joint: Mild degenerative changes. Alignment appears within normal limits. There appears to be mild lateral downsloping of the distal acromion. No periarticular edema. Ligaments appear intact. Bone: No fracture is identified. No suspicious marrow signal abnormality or osseous lesion is identified. Miscellaneous: There is a small amount of fluid in the subacromial/subdeltoid bursa. No significant deltoid muscle atrophy or edema. No pathologically enlarged axillary lymph nodes.     Impression: Impression: 1.Small amount of fluid in the subacromial/subdeltoid bursa which may indicate mild bursitis. 2.Suspected shallow bursal surface defect at the anterior supraspinatus insertion. No definite high-grade or full-thickness rotator cuff tendon defect is seen at this time. 3.Mild glenohumeral osteoarthritis with suspected chronic degeneration of the posterior labrum. 4.Mild acromioclavicular joint degeneration. Electronically Signed: Jose Juan Murillo  4/15/2025 3:42 PM EDT  Workstation ID: [id number]             Assessment and Plan     Diagnoses and all orders for this visit:    1. Right shoulder pain, unspecified chronicity (Primary)    2. Adhesive capsulitis of right shoulder    3. Impingement syndrome of right shoulder        Discussed the treatment plan with the patient. I reviewed the MRI results with the patient.     Discussed the risks and benefits of  conservative measures. The patient expressed understanding and wished to proceed with a right shoulder steroid injection.  He tolerated the injection well.     Discussed with the patient that due to the steroid injection given today in the office they may see an increase in blood sugar for a few days. Advised patient to monitor sugar after receiving the injection.     Discussed possibility of a reaction from the injection.  Discussed the possibility that the injection may not completely improve or remove the pain.  Discussed the risk of infection.    Continue HEP exercises.      Call or return if worsening symptoms.    Follow Up     4-6 weeks to assess if injection has helped.        Patient was given instructions and counseling regarding his condition or for health maintenance advice. Please see specific information pulled into the AVS if appropriate.     Scribed for Jody Hou MD by Christiane Jackson MA.  05/08/25   16:20 EDT    I have personally performed the services described in this document as scribed by the above individual and it is both accurate and complete. Jody Hou MD 05/08/25

## 2025-05-21 NOTE — ED PROVIDER NOTES
Time: 12:38 PM EST  Date of encounter:  1/17/2025  Independent Historian/Clinical History and Information was obtained by:   Patient    History is limited by: N/A    Chief Complaint: Headache, nausea, vomiting      History of Present Illness:  Patient is a 44 y.o. year old male who presents to the emergency department for evaluation of headache, nausea, vomiting.  Patient reports the vomiting started this morning, reports he is also starting to have some abdominal cramping with vomiting but denies abdominal pain.  Patient denies fevers, denies diarrhea versus patient.  Patient reports he has been feeling unwell for the past 6 weeks.  Patient is type I diabetic, states he has had difficulty getting a new sensor for his continuous glucose monitor and has not had the sensor for the past 4 months.       Patient Care Team  Primary Care Provider: Jak Colindres APRN    Past Medical History:     Allergies   Allergen Reactions    Penicillins Anaphylaxis     Childhood allergy      Codeine Itching     Past Medical History:   Diagnosis Date    Allergic     Allergies     Anxiety     Back injury     Broken bones     Deafness     Diabetes     Diabetes mellitus type I     Foot pain, bilateral 2018    Fracture     Hammertoe     Head injury     Hernia cerebri     Hyperlipidemia     Hypothyroidism     Neuropathy in diabetes 2009    Plantar fascial fibromatosis of both feet 2018    Toe pain, right      Past Surgical History:   Procedure Laterality Date    COLONOSCOPY  2017    HERNIA REPAIR      TOENAIL EXCISION  2021     Family History   Family history unknown: Yes       Home Medications:  Prior to Admission medications    Medication Sig Start Date End Date Taking? Authorizing Provider   acetaminophen (TYLENOL) 500 MG tablet Take 2 tablets by mouth 3 (Three) Times a Day As Needed (pain). 8/5/24   Carlos Cedeno MD   Continuous Glucose Sensor (FreeStyle Nadine 3 Plus Sensor) Use Every 14 (Fourteen) Days. 11/20/24   Jak Colindres APRN    cyclobenzaprine (FLEXERIL) 10 MG tablet Take 1 tablet by mouth 2 (Two) Times a Day As Needed for Muscle Spasms. 12/20/23   Jak Colindres APRN   Euthyrox 25 MCG tablet Take 1 tablet by mouth Daily. 7/12/21   Provider, MD Rudolph   Insulin Degludec (Tresiba FlexTouch) 200 UNIT/ML solution pen-injector pen injection Inject 44 Units under the skin into the appropriate area as directed Daily. 12/23/24   Jak Colindres APRN   montelukast (Singulair) 10 MG tablet Take 1 tablet by mouth Every Night. 6/5/24   Nia Rodas APRN   NovoLOG FlexPen 100 UNIT/ML solution pen-injector sc pen Inject 30 Units under the skin into the appropriate area as directed 3 (Three) Times a Day With Meals. 11/19/24   Jak Colindres APRN   omeprazole (priLOSEC) 40 MG capsule TAKE 1 CAPSULE BY MOUTH ONCE DAILY BEFORE A MEAL 10/7/24   Jak Colindres APRN   simvastatin (ZOCOR) 10 MG tablet Take 1 tablet by mouth Every Night. 11/11/21   Indu Lloyd APRN        Social History:   Social History     Tobacco Use    Smoking status: Former     Average packs/day: 0.5 packs/day for 14.3 years (7.1 ttl pk-yrs)     Types: Cigarettes, Cigars     Start date: 7/23/1997     Passive exposure: Past    Smokeless tobacco: Never    Tobacco comments:     Quit 2011   Vaping Use    Vaping status: Never Used   Substance Use Topics    Alcohol use: Not Currently    Drug use: Never         Review of Systems:  Review of Systems   Constitutional:  Positive for fatigue. Negative for activity change, appetite change and fever.   HENT:  Negative for congestion and sore throat.    Eyes: Negative.    Respiratory:  Negative for cough and shortness of breath.    Cardiovascular:  Negative for chest pain and leg swelling.   Gastrointestinal:  Positive for nausea and vomiting. Negative for abdominal pain and diarrhea.   Endocrine: Negative.    Genitourinary:  Negative for decreased urine volume and dysuria.   Musculoskeletal:  Negative for neck pain.   Skin:  Negative  "for rash and wound.   Allergic/Immunologic: Negative.    Neurological:  Positive for headaches. Negative for weakness and numbness.   Hematological: Negative.    Psychiatric/Behavioral: Negative.     All other systems reviewed and are negative.       Physical Exam:  /90   Pulse 70   Temp 98.4 °F (36.9 °C) (Oral)   Resp 18   Ht 185.4 cm (73\")   Wt 105 kg (232 lb 5.8 oz)   SpO2 93%   BMI 30.66 kg/m²     Physical Exam  Vitals and nursing note reviewed.   Constitutional:       Appearance: Normal appearance. He is not ill-appearing or toxic-appearing.   HENT:      Head: Normocephalic.      Right Ear: Tympanic membrane and ear canal normal.      Left Ear: Tympanic membrane and ear canal normal.      Nose: Nose normal.   Eyes:      Extraocular Movements: Extraocular movements intact.      Conjunctiva/sclera: Conjunctivae normal.      Pupils: Pupils are equal, round, and reactive to light.   Cardiovascular:      Rate and Rhythm: Normal rate.      Pulses: Normal pulses.   Pulmonary:      Effort: Pulmonary effort is normal.   Abdominal:      General: Abdomen is flat. There is no distension.      Palpations: Abdomen is soft.   Musculoskeletal:      Cervical back: Normal range of motion and neck supple.   Skin:     General: Skin is warm and dry.      Capillary Refill: Capillary refill takes less than 2 seconds.      Coloration: Skin is pale.   Neurological:      General: No focal deficit present.      Mental Status: He is alert and oriented to person, place, and time.   Psychiatric:         Mood and Affect: Mood normal.              Medical Decision Making:      Comorbidities that affect care:    Diabetes, Thyroid Disease    External Notes reviewed:    Previous Clinic Note: Family medicine office visit from 12/27/2024      The following orders were placed and all results were independently analyzed by me:  Orders Placed This Encounter   Procedures    XR Chest 1 View    [place] Draw    Comprehensive Metabolic Panel "       OBJECTIVE/EXAMINATION  Posture: fair posture with rounded shoulders and forward head    Other Observations: Obese female transported in  accompanied by her daughter. Patient is polite, alert and able  to follow instructions with clarification and demonstrations. Genu Valgus deformity of both knees. More motivated than prior visits     Gait and Functional Mobility:  Patient is non-ambulatory. Sitting Balance on mat with hand support, Stand pivot transferred from  to [facility] with walker   with moderate assistance X 2 .  Rolling left to right and right to left independently on mat table. Supine to sitting independently.  Transfer from [facility] to  with  moderate assistance x 1, scooting independently      Palpation: Tenderness of the old right tibia/fibula fracture site and old healing wound side on anterior/lateral calf    Balance Testing:   ROQUE: not able to perform  Tandem Stance: Not able to perform   SLS: Not able to perform  Fall Risk: High     Joint Flexibility: Fair    Hip:  Strength      Right Left Right Left    Flexion 3+/5 3+/5 WFL WFL    Extension 3+/5 3+/5 WFL WFL    Abduction 3+/5 4/5 WFL WFL    Adduction 4/5 4/5 WFL WFL   Knee:  Strength AROM     Right Left Right Left    Flexion 3+/5 4/5 WFL WFL    Extension 3+/5 4/5 WFL WFL   Ankle  Strength AROM     Right Left Right Left    Dorsiflexion 4/5 4/5 WFL WFL    Plantarflexion 4/5 4/5 WFL WFL    Inversion 4/5 4/5 WFL WFL    Eversion 4/5 4/5 WFL WFL   *All strength measures are on a scale with 5 as a maximum, if a space is left blank it was not tested.  All ROM measurements are measured in degrees.    Neurological: Reflexes / Sensations: intact       55 min [x]Eval - untimed                        Pain Level at end of session (0-10 scale): 8/10    Plan of Care / Statement of Necessity for Physical Therapy Services     Assessment / key information:  Patient is a 76 year old female returning to Physical Therapy for another evaluation for rehab facility     Lipase    Urinalysis With Microscopic If Indicated (No Culture) - Urine, Clean Catch    CBC Auto Differential    Urinalysis, Microscopic Only - Urine, Clean Catch    Diet: Regular/House; Fluid Consistency: Thin (IDDSI 0)    Undress & Gown    POC Glucose STAT    POC Glucose STAT    Insert Peripheral IV    CBC & Differential    Green Top (Gel)    Lavender Top    Gold Top - SST    Light Blue Top    Extra Tubes    Gray Top       Medications Given in the Emergency Department:  Medications   sodium chloride 0.9 % flush 10 mL (has no administration in time range)   ondansetron (ZOFRAN) injection 4 mg (4 mg Intravenous Given 1/17/25 1242)   ketorolac (TORADOL) injection 30 mg (30 mg Intravenous Given 1/17/25 1337)   diphenhydrAMINE (BENADRYL) injection 25 mg (25 mg Intravenous Given 1/17/25 1339)   metoclopramide (REGLAN) injection 10 mg (10 mg Intravenous Given 1/17/25 1338)        ED Course:    ED Course as of 01/17/25 1510   Fri Jan 17, 2025   1337 Patient reports his nausea has improved after Zofran and is requesting to eat and drink. [RM]   1337 Glucose(!): 45  Patient was provided orange juice and turkey sandwich. [RM]      ED Course User Index  [RM] Abbie Chandra APRN       Labs:    Lab Results (last 24 hours)       Procedure Component Value Units Date/Time    Urinalysis With Microscopic If Indicated (No Culture) - Urine, Clean Catch [[id number]]  (Abnormal) Collected: 01/17/25 1151    Specimen: Urine, Clean Catch Updated: 01/17/25 1205     Color, UA Yellow     Appearance, UA Clear     pH, UA 7.0     Specific Gravity, UA 1.018     Glucose, UA Negative     Ketones, UA Trace     Bilirubin, UA Negative     Blood, UA Negative     Protein,  mg/dL (2+)     Leuk Esterase, UA Negative     Nitrite, UA Negative     Urobilinogen, UA 1.0 E.U./dL    Urinalysis, Microscopic Only - Urine, Clean Catch [[id number]] Collected: 01/17/25 1151    Specimen: Urine, Clean Catch Updated: 01/17/25 1205     RBC, UA 0-2 /HPF      WBC,  UA 0-2 /HPF      Bacteria, UA None Seen /HPF      Squamous Epithelial Cells, UA 0-2 /HPF      Hyaline Casts, UA 0-2 /LPF      Methodology Automated Microscopy    CBC & Differential [[id number]]  (Normal) Collected: 01/17/25 1156    Specimen: Blood Updated: 01/17/25 1207    Narrative:      The following orders were created for panel order CBC & Differential.  Procedure                               Abnormality         Status                     ---------                               -----------         ------                     CBC Auto Differential[[id number]]        Normal              Final result                 Please view results for these tests on the individual orders.    Comprehensive Metabolic Panel [[id number]]  (Abnormal) Collected: 01/17/25 1156    Specimen: Blood Updated: 01/17/25 1227     Glucose 51 mg/dL      BUN 11 mg/dL      Creatinine 0.94 mg/dL      Sodium 141 mmol/L      Potassium 4.0 mmol/L      Chloride 103 mmol/L      CO2 28.7 mmol/L      Calcium 9.0 mg/dL      Total Protein 7.3 g/dL      Albumin 4.1 g/dL      ALT (SGPT) 29 U/L      AST (SGOT) 26 U/L      Alkaline Phosphatase 101 U/L      Total Bilirubin 0.7 mg/dL      Globulin 3.2 gm/dL      A/G Ratio 1.3 g/dL      BUN/Creatinine Ratio 11.7     Anion Gap 9.3 mmol/L      eGFR 101.9 mL/min/1.73     Narrative:      GFR Categories in Chronic Kidney Disease (CKD)      GFR Category          GFR (mL/min/1.73)    Interpretation  G1                     90 or greater         Normal or high (1)  G2                      60-89                Mild decrease (1)  G3a                   45-59                Mild to moderate decrease  G3b                   30-44                Moderate to severe decrease  G4                    15-29                Severe decrease  G5                    14 or less           Kidney failure          (1)In the absence of evidence of kidney disease, neither GFR category G1 or G2 fulfill the criteria for CKD.    eGFR calculation 2021  CKD-EPI creatinine equation, which does not include race as a factor    Lipase [[id number]]  (Normal) Collected: 01/17/25 1156    Specimen: Blood Updated: 01/17/25 1227     Lipase 13 U/L     CBC Auto Differential [[id number]]  (Normal) Collected: 01/17/25 1156    Specimen: Blood Updated: 01/17/25 1207     WBC 7.51 10*3/mm3      RBC 5.79 10*6/mm3      Hemoglobin 16.9 g/dL      Hematocrit 50.1 %      MCV 86.5 fL      MCH 29.2 pg      MCHC 33.7 g/dL      RDW 12.5 %      RDW-SD 39.3 fl      MPV 9.5 fL      Platelets 284 10*3/mm3      Neutrophil % 70.9 %      Lymphocyte % 21.2 %      Monocyte % 6.0 %      Eosinophil % 1.1 %      Basophil % 0.5 %      Immature Grans % 0.3 %      Neutrophils, Absolute 5.33 10*3/mm3      Lymphocytes, Absolute 1.59 10*3/mm3      Monocytes, Absolute 0.45 10*3/mm3      Eosinophils, Absolute 0.08 10*3/mm3      Basophils, Absolute 0.04 10*3/mm3      Immature Grans, Absolute 0.02 10*3/mm3      nRBC 0.0 /100 WBC     POC Glucose STAT [[id number]]  (Abnormal) Collected: 01/17/25 1331    Specimen: Blood Updated: 01/17/25 1334     Glucose 45 mg/dL      Comment: Serial Number: [id number]:  [id number]       POC Glucose STAT [[id number]]  (Abnormal) Collected: 01/17/25 1450    Specimen: Blood Updated: 01/17/25 1452     Glucose 211 mg/dL      Comment: Serial Number: [id number]:  [id number]                Imaging:    XR Chest 1 View    Result Date: 1/17/2025  XR CHEST 1 VW Date of Exam: 1/17/2025 2:15 PM EST Indication: cough Comparison: 7/31/2024 radiographs Findings: Unremarkable cardiomediastinal silhouette. Linear airspace opacity in the left lung base favoring atelectasis. No focal airspace consolidation. No pleural effusion or pneumothorax. No acute osseous abnormality.     Impression: Linear airspace opacity in the left lung base favoring atelectasis. Otherwise no focal airspace consolidation. Electronically Signed: Iam Millard MD  1/17/2025 2:23 PM EST  Workstation ID: [id number]        Differential Diagnosis and Discussion:    Headache: Differential diagnosis includes but is not limited to migraine, cluster headache, hypertension, tumor, subarachnoid bleeding, pseudotumor cerebri, temporal arteritis, infections, tension headache, and TMJ syndrome.  Metabolic: Differential diagnosis includes but is not limited to hypertension, hyperglycemia, hyperkalemia, hypocalcemia, metabolic acidosis, hypokalemia, hypoglycemia, malnutrition, hypothyroidism, hyperthyroidism, and adrenal insufficiency.   Vomiting: Differential diagnosis includes but is not limited to migraine, labyrinthine disorders, psychogenic, metabolic and endocrine causes, peptic ulcer, gastric outlet obstruction, gastritis, gastroenteritis, appendicitis, intestinal obstruction, paralytic ileus, food poisoning, cholecystitis, acute hepatitis, acute pancreatitis, acute febrile illness, and myocardial infarction.    PROCEDURES:    Labs were collected in the emergency department and all labs were reviewed and interpreted by me.  X-ray were performed in the emergency department and all X-ray impressions were independently interpreted by me.    No orders to display       Procedures    MDM     The patient presents with nausea and vomiting consistent with gastroenteritis. The patient has a soft and benign abdominal exam. The patient is now resting comfortably and feels better, is alert, and is in no distress. The patient is able to tolerate po intake in the ED. The patient's labs were reviewed and hydration status was assessed. The patient has no signs of acute renal failure, sepsis, or dehydration that warrants admission to the hospital. The vital signs have been stable. The patient's condition is stable and appropriate for discharge. The patient will pursue further outpatient evaluation with the primary care physician or designated physician. The patient and/or caregivers have expressed a clear and thorough understanding and agreed to  follow-up as instructed.                Patient Care Considerations:    CT ABDOMEN AND PELVIS: I considered ordering a CT scan of the abdomen and pelvis however no abdominal pain or tenderness.  ANTIBIOTICS: I considered prescribing antibiotics as an outpatient however no bacterial focus of infection was found.      Consultants/Shared Management Plan:    None    Social Determinants of Health:    Patient is independent, reliable, and has access to care.       Disposition and Care Coordination:    Discharged: I considered escalation of care by admitting this patient to the hospital, however headache is now controlled, patient is no longer vomiting and is able to tolerate oral intake in the ED.    I have explained the patient´s condition, diagnoses and treatment plan based on the information available to me at this time. I have answered questions and addressed any concerns. The patient has a good  understanding of the patient´s diagnosis, condition, and treatment plan as can be expected at this point. The vital signs have been stable. The patient´s condition is stable and appropriate for discharge from the emergency department.      The patient will pursue further outpatient evaluation with the primary care physician or other designated or consulting physician as outlined in the discharge instructions. They are agreeable to this plan of care and follow-up instructions have been explained in detail. The patient has received these instructions in written format and has expressed an understanding of the discharge instructions. The patient is aware that any significant change in condition or worsening of symptoms should prompt an immediate return to this or the closest emergency department or call to 911.  I have explained discharge medications and the need for follow up with the patient/caretakers. This was also printed in the discharge instructions. Patient was discharged with the following medications and follow up:       Medication List        New Prescriptions      dicyclomine 20 MG tablet  Commonly known as: BENTYL  Take 1 tablet by mouth Every 6 (Six) Hours.     ondansetron ODT 4 MG disintegrating tablet  Commonly known as: ZOFRAN-ODT  Place 1 tablet on the tongue 4 (Four) Times a Day As Needed for Nausea or Vomiting.               Where to Get Your Medications        These medications were sent to [place] [address] - [place], [place] - [address] [facility] - [phone] [facility] [phone]   [address] [facility] [place] [address]      Phone: [phone]   dicyclomine 20 MG tablet  ondansetron ODT 4 MG disintegrating tablet      Jak Colindres, APRN  [address] [facility] [address]  [place] [address] [phone]    Call in 2 days         Final diagnoses:   Nausea and vomiting, unspecified vomiting type   Nonintractable headache, unspecified chronicity pattern, unspecified headache type   Hypoglycemia        ED Disposition       ED Disposition   Discharge    Condition   Stable    Comment   --               This medical record created using voice recognition software.             Abbie Chandra, APRN  01/17/25 9253

## 2025-05-28 ENCOUNTER — TELEPHONE (OUTPATIENT)
Dept: ORTHOPEDIC SURGERY | Facility: CLINIC | Age: 45
End: 2025-05-28
Payer: COMMERCIAL

## 2025-05-29 ENCOUNTER — TREATMENT (OUTPATIENT)
Dept: PHYSICAL THERAPY | Facility: CLINIC | Age: 45
End: 2025-05-29
Payer: COMMERCIAL

## 2025-05-29 DIAGNOSIS — G89.29 CHRONIC RIGHT SHOULDER PAIN: Primary | ICD-10-CM

## 2025-05-29 DIAGNOSIS — M25.611 DECREASED ROM OF RIGHT SHOULDER: ICD-10-CM

## 2025-05-29 DIAGNOSIS — M25.511 CHRONIC RIGHT SHOULDER PAIN: Primary | ICD-10-CM

## 2025-05-29 DIAGNOSIS — M62.81 MUSCLE WEAKNESS OF RIGHT ARM: ICD-10-CM

## 2025-05-29 NOTE — PROGRESS NOTES
Physical Therapy Initial Evaluation and Plan of Care      [facility]: [address] [place] [address]      Patient: George Hunter   : 1980  Diagnosis/ICD-10 Code:  Chronic right shoulder pain [M25.511, G89.29]  Referring practitioner: Jody Hou MD  Date of Initial Visit: 2025  Encounter Date:  2025  Patient seen for 1 sessions           Subjective Questionnaire: QuickDASH: 23      Subjective   George Hunter reports to physical therapy w/ complaints of R shoulder pain. Pt is following up after ortho appointment, where they received an injection. Pt reports the pain has been better since the steroid injection. Pt notes the normal every day pain is a 3-4/10. If they use their arm a little more, they can have more higher levels of pain and fatigue. Pt notes they can still have the sharp pain, which occurs when they are rolling over in bed or moving it in the wrong direction.     Pt occupation: desk/computer work     Current Pain: 3/10  Best Pain: 1-2/10  Worst Pain: 7-8/10  Quality of pain: sharp (very rare now), ache and irritation.       MRI Impression:  1.Small amount of fluid in the subacromial/subdeltoid bursa which may indicate mild bursitis.  2.Suspected shallow bursal surface defect at the anterior supraspinatus insertion. No definite high-grade or full-thickness rotator cuff tendon defect is seen at this time.  3.Mild glenohumeral osteoarthritis with suspected chronic degeneration of the posterior labrum.  4.Mild acromioclavicular joint degeneration.  Electronically Signed: Jose Juan Murillo 4/15/2025      Past Medical Hx:  Past Medical History:   Diagnosis Date    Allergic     Allergies     Ankle sprain 1987    Multiple times    Anxiety     Back injury     Broken bones     Deafness     Diabetes     Diabetes mellitus type I     Foot pain, bilateral 2018    Fracture     Fracture of wrist 2005    Hammertoe     Head injury     Hernia cerebri     Hyperlipidemia     Hypothyroidism  "    Knee sprain 1995    Neuropathy in diabetes 2009    Plantar fascial fibromatosis of both feet 2018    Rotator cuff syndrome 9/2024    Toe pain, right        Past Surgical History:   Procedure Laterality Date    COLONOSCOPY  2017    HERNIA REPAIR      TOENAIL EXCISION  2021         Objective          Joint Play     Right Shoulder  Hypomobile in the posterior capsule and inferior capsule.       Active Range of Motion   Left Shoulder   Flexion: 145 degrees   Abduction: 125 degrees   External rotation BTH: T3   Internal rotation BTB: T12      Right Shoulder   Flexion: 101 degrees with pain  Abduction: 90 degrees with pain  External rotation BTH: C2 with pain  Internal rotation BTB: L5 with pain     \"Feels like something is there, stopping me from doing it, like a cog.\"       Passive Range of Motion      Right Shoulder   Flexion: 126 degrees with pain  Abduction: 112 degrees with pain  External rotation 45°: 50 degrees   Internal rotation 45°: 45 degrees      Additional Passive Range of Motion Details  Muscle guarding at top of motion.      Strength/Myotome Testing      Left Shoulder      Planes of Motion   Flexion: 5   Abduction: 5   External rotation at 0°: 5   Internal rotation at 0°: 5      Isolated Muscles   Biceps: 5   Triceps: 5      Right Shoulder      Planes of Motion   Flexion: 4   Abduction: 4+   External rotation at 0°: 4   Internal rotation at 0°: 4+      Isolated Muscles   Biceps: 4+   Triceps: 4+       See Exercise, Manual, and Modality Logs for complete treatment.       Assessment/Plan  Impairments: abnormal or restricted ROM, activity intolerance, impaired physical strength and pain with function   Functional limitations: carrying objects, lifting, sleeping, pulling, pushing, reaching behind back, reaching overhead and unable to perform repetitive tasks   Assessment details: Pt reports to therapy w/ complaints of R shoulder pain. Pt continues to have deficits in their ROM and strength, limiting them " from performing overhead tasks and ADLs. Pt does have pain, though this has improved since steroid injection. Pt has perceived deficits described by Adiel. Pt's imaging reveals pathology w/n shoulder joint. W/ injections controlling pain, pt will likely have greater improvements w/ therapy to address those deficits. Pt will benefit from skilled PT to improve upon their functional mobility and to return to daily tasks w/o restrictions.         Goals  Plan Goals: SHOULDER  PROBLEMS:      1. The patient has limited AROM of the R shoulder.                  LTG 1: 12 weeks:  The patient will demonstrate 145 degrees of R shoulder flexion, 145 degrees of shoulder abduction, to T3 with overhead external rotation, and to T12 with behind their back internal rotation to allow the patient to perform all dressing tasks without restrictions.                          STATUS:  New              STG 1a: 6 weeks:  The patient will demonstrate 120 degrees of R shoulder flexion, 120 degrees of shoulder abduction, to C5 with overhead external rotation, and to L4 with behind their back internal rotation to allow the patient to perform all dressing tasks without restrictions.                          STATUS:  New              TREATMENT: Manual therapy, therapeutic exercise, home exercise instruction, and modalities as needed to include: electrical stimulation, moist heat, and ice.     2. The patient has limited strength of the R shoulder.              LTG 2: 12 weeks:  The patient will demonstrate 5 /5 strength for R shoulder flexion, abduction, external rotation, and internal rotation in order to demonstrate improved shoulder stability.                          STATUS:  New              STG 2a: 6 weeks:  The patient will demonstrate 4+ /5 strength for R shoulder flexion, abduction, external rotation, and internal rotation.                          STATUS:  New              STG2b:  6 weeks:  The patient will be independent with home  exercises.                           STATUS:  New              TREATMENT: Manual therapy, therapeutic exercise, home exercise instruction, and modalities as needed to include: electrical stimulation, moist heat, and ice.                3. The patient complains of pain to the R shoulder.              LTG 3:12 weeks:  The patient will report a pain rating of 1 /10 or better in order to improve sleep quality and tolerance to performance of activities of daily living.                          STATUS:  New              STG 3a: 6 weeks:  The patient will report a pain rating of 2 /10 or better.                           STATUS:  New              TREATMENT: Manual therapy, therapeutic exercise, home exercise instruction, and modalities as needed to include: electrical stimulation, moist heat, and ice.     4. Carrying, Moving, and Handling Objects Functional Limitation                               LTG 4: 12 weeks:  The patient will demonstrate 5 % limitation by achieving a score of 13 on the QuickDASH.                          STATUS:  New              STG 4a: 6 weeks:  The patient will demonstrate 20 % limitation by achieving a score of 20 on the QuickDASH.                            STATUS:  New              TREATMENT:  Manual therapy, therapeutic exercise, home exercise instruction, and modalities as needed to include: moist heat and electrical stimulation.            PLAN:  Therapy options: will receive skilled therapy services  Planned modality interventions: Cryotherapy, Heat, and TENS  Planned therapy interventions:ADL retraining, soft tissue mobilization, strengthening, stretching, therapeutic activities, manual therapy, joint mobilization, home exercise program/patient education, functional ROM exercises, flexibility, body mechanics training, postural training, and neuromuscular re-education  Frequency: 2x per week  Duration in weeks: 12  Treatment plan discussed with: patient      Visit Diagnoses:    ICD-10-CM  ICD-9-CM   1. Chronic right shoulder pain  M25.511 719.41    G89.29 338.29   2. Decreased ROM of right shoulder  M25.611 719.51   3. Muscle weakness of right arm  M62.81 728.87       History # of Personal Factors and/or Comorbidities: MODERATE (1-2)  Examination of Body System(s): # of elements: LOW (1-2)  Clinical Presentation: STABLE   Clinical Decision Making: LOW       Timed:         Manual Therapy:    10     mins  44832;     Therapeutic Exercise:    20     mins  39849;     Neuromuscular Josh:    0    mins  25291;    Therapeutic Activity:     0     mins  58388;     Gait Trainin     mins  44438;     Ultrasound:     0     mins  56216;    Ionto                               0    mins   44755  Self Care                       0     mins   58524  Canalith Repos    0     mins 85570      Un-Timed:  Electrical Stimulation:    0     mins  66573 ( );  Dry Needling     0     mins self-pay  Traction     0     mins 34996  Low Eval     10     Mins  10388  Mod Eval     0     Mins  89081  High Eval                       0     Mins  22812  Re-Eval                           0    mins  92402    Timed Treatment:   20   mins   Total Treatment:     40   mins    PT SIGNATURE: Mauricio Valentine PT, DPT    Electronically signed  2025    [place] License: PT - [id number]    Initial Certification  Certification Period: 2025 thru 2025  I certify that the therapy services are furnished while this patient is under my care.  The services outlined above are required by this patient, and will be reviewed every 90 days.     PHYSICIAN: Jody Hou MD  NPI: [id number]      DATE:     Please sign and return via fax to [phone]. Thank you, [facility].

## 2025-06-05 ENCOUNTER — TREATMENT (OUTPATIENT)
Dept: PHYSICAL THERAPY | Facility: CLINIC | Age: 45
End: 2025-06-05
Payer: COMMERCIAL

## 2025-06-05 DIAGNOSIS — M25.511 CHRONIC RIGHT SHOULDER PAIN: Primary | ICD-10-CM

## 2025-06-05 DIAGNOSIS — G89.29 CHRONIC RIGHT SHOULDER PAIN: Primary | ICD-10-CM

## 2025-06-05 DIAGNOSIS — M62.81 MUSCLE WEAKNESS OF RIGHT ARM: ICD-10-CM

## 2025-06-05 DIAGNOSIS — M25.611 DECREASED ROM OF RIGHT SHOULDER: ICD-10-CM

## 2025-06-05 NOTE — PROGRESS NOTES
Outpatient Physical Therapy                   Physical Therapy Daily Treatment Note    Patient: George Hunter   : 1980  Diagnosis/ICD-10 Code:  Chronic right shoulder pain [M25.511, G89.29]  Referring practitioner: Jody Hou MD  Date of Initial Visit: Type: THERAPY  Noted: 2025  Today's Date: 2025  Patient seen for 2 sessions             Subjective   George Hunter reports: he isn't able to do his exercises much Monday-Wednesday due to working in [employer] but he does them more on the weekends and when he works from home. Pt states quick movents cause sharp shooting pain that last a couple minutes and it feels like he can't use it.     Pain: 3/10 pain, at time of arrival.     Objective     See Exercise, Manual, and Modality Logs for complete treatment.     Assessment/Plan  George is just beginning care to attend to deficits outlined in evaluation, requiring further therapist directed strengthening. Assess how patient tolerated treatment next session.    Progress per Plan of Care      Timed:  Manual Therapy:    10     mins  26683;  Therapeutic Exercise:    10     mins  69441;  Neuromuscular Josh:    0    mins  08491;  Therapeutic Activity:     10     mins  18593;  Self care:                       0     mins  80504;  Gait Trainin     mins  71050;  Ultrasound:                    0     mins  71306;    Untimed:  Mechanical Traction:    0     mins  53556;  Electrical Stimulation:    0     mins  92914 ( );      Timed Treatment:   30   mins  Total Treatment:     30   mins      Electronically signed:   Maddie Beavers PTA  Physical Therapist Assistant  [facility] License #: [id number]

## 2025-06-06 ENCOUNTER — TREATMENT (OUTPATIENT)
Dept: PHYSICAL THERAPY | Facility: CLINIC | Age: 45
End: 2025-06-06
Payer: COMMERCIAL

## 2025-06-06 DIAGNOSIS — M25.511 CHRONIC RIGHT SHOULDER PAIN: Primary | ICD-10-CM

## 2025-06-06 DIAGNOSIS — G89.29 CHRONIC RIGHT SHOULDER PAIN: Primary | ICD-10-CM

## 2025-06-06 DIAGNOSIS — M62.81 MUSCLE WEAKNESS OF RIGHT ARM: ICD-10-CM

## 2025-06-06 DIAGNOSIS — M25.611 DECREASED ROM OF RIGHT SHOULDER: ICD-10-CM

## 2025-06-06 NOTE — PROGRESS NOTES
Outpatient Physical Therapy                   Physical Therapy Daily Treatment Note    Patient: George Hunter   : 1980  Diagnosis/ICD-10 Code:  Chronic right shoulder pain [M25.511, G89.29]  Referring practitioner: Jody Hou MD  Date of Initial Visit: Type: THERAPY  Noted: 2025  Today's Date: 2025  Patient seen for 3 sessions             Subjective   George Hunter reports: he is really sore today. He tried to do exercises after work yesterday but stopped because of soreness.     Pain: 5-4/10 pain, at time of arrival.     Objective     See Exercise, Manual, and Modality Logs for complete treatment.     Assessment/Plan  George demonstrated good tolerance to all functional UE strengthening. Continue to progress with current PT plan of care per patient tolerance.       Progress per Plan of Care      Timed:  Manual Therapy:    10     mins  65652;  Therapeutic Exercise:    8     mins  15594;  Neuromuscular Josh:    0    mins  11445;  Therapeutic Activity:     8     mins  35452;  Self care:                       0     mins  23296;  Gait Trainin     mins  97907;  Ultrasound:                    0     mins  73618;    Untimed:  Mechanical Traction:    0     mins  22998;  Electrical Stimulation:    0     mins  45726 ( );      Timed Treatment:   26   mins  Total Treatment:     26   mins      Electronically signed:   Maddie Beavers PTA  Physical Therapist Assistant  [facility] License #: [id number]

## 2025-06-12 ENCOUNTER — TREATMENT (OUTPATIENT)
Dept: PHYSICAL THERAPY | Facility: CLINIC | Age: 45
End: 2025-06-12
Payer: COMMERCIAL

## 2025-06-12 DIAGNOSIS — M25.511 CHRONIC RIGHT SHOULDER PAIN: Primary | ICD-10-CM

## 2025-06-12 DIAGNOSIS — G89.29 CHRONIC RIGHT SHOULDER PAIN: Primary | ICD-10-CM

## 2025-06-12 DIAGNOSIS — M25.611 DECREASED ROM OF RIGHT SHOULDER: ICD-10-CM

## 2025-06-12 DIAGNOSIS — M62.81 MUSCLE WEAKNESS OF RIGHT ARM: ICD-10-CM

## 2025-06-12 PROCEDURE — 97530 THERAPEUTIC ACTIVITIES: CPT

## 2025-06-12 PROCEDURE — 97110 THERAPEUTIC EXERCISES: CPT

## 2025-06-12 NOTE — PROGRESS NOTES
"Outpatient Physical Therapy                   Physical Therapy Daily Treatment Note    Patient: George Hunter   : 1980  Diagnosis/ICD-10 Code:  Chronic right shoulder pain [M25.511, G89.29]  Referring practitioner: Jody Hou MD  Date of Initial Visit: Type: THERAPY  Noted: 2025  Today's Date: 2025  Patient seen for 4 sessions             Subjective   George Hunter reports: his arm has been feeling really tired and weak this week. Pt states he has done some paint touch ups in his house. He was only able to do the low spots but that is where it was needed mostly anyway. Pt states that made his arm really sore.     Pt states he is sore after PT sessions for a few hours. He also notes that when he is sitting at his desk for work he has to move around a lot more because his arm gets tired from being up to reach the key board. Pt continues to have pain when laying on his right side.     Pain: 0/10 pain, \"just weak feeling when I try to move it; I have to put extra effort into it.\"    Objective     See Exercise, Manual, and Modality Logs for complete treatment.     Assessment/Plan  George demonstrated good tolerance to all functional UE strengthening. Pt reports fatigue with the last few reps of wall clocks and SA wall slides. Continue to progress with current PT plan of care per patient tolerance.       Progress per Plan of Care      Timed:  Manual Therapy:    0     mins  34841;  Therapeutic Exercise:    16     mins  75340;  Neuromuscular Josh:    0    mins  50126;  Therapeutic Activity:     10     mins  84861;  Self care:                       0     mins  92559;  Gait Trainin     mins  18044;  Ultrasound:                    0     mins  12453;    Untimed:  Mechanical Traction:    0     mins  85203;  Electrical Stimulation:    0     mins  70942 ( );      Timed Treatment:   26   mins  Total Treatment:     26   mins      Electronically signed:   Maddie Beavers PTA  Physical Therapist " Assistant  [place] [facility] License #: [id number]

## 2025-06-13 ENCOUNTER — TREATMENT (OUTPATIENT)
Dept: PHYSICAL THERAPY | Facility: CLINIC | Age: 45
End: 2025-06-13
Payer: COMMERCIAL

## 2025-06-13 DIAGNOSIS — M25.511 CHRONIC RIGHT SHOULDER PAIN: Primary | ICD-10-CM

## 2025-06-13 DIAGNOSIS — M62.81 MUSCLE WEAKNESS OF RIGHT ARM: ICD-10-CM

## 2025-06-13 DIAGNOSIS — G89.29 CHRONIC RIGHT SHOULDER PAIN: Primary | ICD-10-CM

## 2025-06-13 DIAGNOSIS — M25.611 DECREASED ROM OF RIGHT SHOULDER: ICD-10-CM

## 2025-06-13 NOTE — PROGRESS NOTES
"Outpatient Physical Therapy                   Physical Therapy Daily Treatment Note    Patient: George Hunter   : 1980  Diagnosis/ICD-10 Code:  Chronic right shoulder pain [M25.511, G89.29]  Referring practitioner: Jody Hou MD  Date of Initial Visit: Type: THERAPY  Noted: 2025  Today's Date: 2025  Patient seen for 5 sessions             Subjective   George Hunter reports: he is \"very sore\" after mowing and weed eating yesterday.     Pain: 3/10 pain, located in the right shoulder.     Objective     See Exercise, Manual, and Modality Logs for complete treatment.     Assessment/Plan  George was able to progress to new scapular strengthening exercises today with good tolerance. He continues to have soreness with ADL's and exercises.     Progress per Plan of Care      Timed:  Manual Therapy:    9     mins  23762;  Therapeutic Exercise:    13     mins  27170;  Neuromuscular Josh:    0    mins  80168;  Therapeutic Activity:     9     mins  55165;  Self care:                       0     mins  16094;  Gait Trainin     mins  63452;  Ultrasound:                    0     mins  12311;    Untimed:  Mechanical Traction:    0     mins  59718;  Electrical Stimulation:    0     mins  48434 ( );      Timed Treatment:   31   mins  Total Treatment:     31   mins      Electronically signed:   Maddie Beavers PTA  Physical Therapist Assistant  [facility] License #: [id number]  "

## 2025-06-16 PROCEDURE — 82948 REAGENT STRIP/BLOOD GLUCOSE: CPT

## 2025-06-19 ENCOUNTER — TREATMENT (OUTPATIENT)
Dept: PHYSICAL THERAPY | Facility: CLINIC | Age: 45
End: 2025-06-19
Payer: COMMERCIAL

## 2025-06-19 DIAGNOSIS — G89.29 CHRONIC RIGHT SHOULDER PAIN: Primary | ICD-10-CM

## 2025-06-19 DIAGNOSIS — M25.611 DECREASED ROM OF RIGHT SHOULDER: ICD-10-CM

## 2025-06-19 DIAGNOSIS — M25.511 CHRONIC RIGHT SHOULDER PAIN: Primary | ICD-10-CM

## 2025-06-19 DIAGNOSIS — M62.81 MUSCLE WEAKNESS OF RIGHT ARM: ICD-10-CM

## 2025-06-19 NOTE — PROGRESS NOTES
"Outpatient Physical Therapy                   Physical Therapy Daily Treatment Note    Patient: George Hunter   : 1980  Diagnosis/ICD-10 Code:  Chronic right shoulder pain [M25.511, G89.29]  Referring practitioner: Jody Hou MD  Date of Initial Visit: Type: THERAPY  Noted: 2025  Today's Date: 2025  Patient seen for 6 sessions             Subjective   George Hunter reports: there are times that he feels his arm is improving but other times he doesn't see improvement. Pt notes he need to be able to lift 50 lbs and lift items over head. He is still unable to throw a ball or lift a couch over an obstacle to move it out of the house. Pt states he has some home projects in mind that his shoulder is preventing him from completing.     Pain: 0/10 pain, \"just the usual uncomfortable    Objective     See Exercise, Manual, and Modality Logs for complete treatment.     Assessment/Plan  Pt was especially challenged by wall clocks today. Prone ITY's were added to progress scapular strengthening to assist with ADL's pt would like to complete. Pt continues to have shoulder weakness and limited active and passive range. Scapular movement was very limited with prone T's and Y's which also reflected in his arm movement. Pt would benefit from skilled PT to address Range of Motion  and Strength deficits, pain management and any concerns with ADLs.       Progress per Plan of Care      Timed:  Manual Therapy:    8     mins  60713;  Therapeutic Exercise:    23     mins  41093;  Neuromuscular Josh:    9    mins  70668;  Therapeutic Activity:     13     mins  76178;  Self care:                       0     mins  47275;  Gait Trainin     mins  28311;  Ultrasound:                    0     mins  02917;    Untimed:  Mechanical Traction:    0     mins  14421;  Electrical Stimulation:    0     mins  11802 ( );      Timed Treatment:   53   mins  Total Treatment:     53   mins      Electronically signed:   Maddie " GIBSON Beavers  Physical Therapist Assistant  [facility] License #: [id number]

## 2025-06-20 ENCOUNTER — TREATMENT (OUTPATIENT)
Dept: PHYSICAL THERAPY | Facility: CLINIC | Age: 45
End: 2025-06-20
Payer: COMMERCIAL

## 2025-06-20 DIAGNOSIS — M25.511 CHRONIC RIGHT SHOULDER PAIN: Primary | ICD-10-CM

## 2025-06-20 DIAGNOSIS — M62.81 MUSCLE WEAKNESS OF RIGHT ARM: ICD-10-CM

## 2025-06-20 DIAGNOSIS — M25.611 DECREASED ROM OF RIGHT SHOULDER: ICD-10-CM

## 2025-06-20 DIAGNOSIS — G89.29 CHRONIC RIGHT SHOULDER PAIN: Primary | ICD-10-CM

## 2025-06-20 NOTE — PROGRESS NOTES
Physical Therapy Daily Treatment Note  [facility] [address] [facility] [place] [address]      Patient: George Hunter   : 1980  Diagnosis/ICD-10 Code:  Chronic right shoulder pain [M25.511, G89.29]  Referring practitioner: Jody Hou MD  Date of Initial Visit: Type: THERAPY  Noted: 2025  Encounter Date:  2025  Patient seen for 7 sessions           Subjective   George Hunter reported they have started to do a lot of tasks w/ their L UE due to pain and inability to use their R shoulder. Pt notes they feel as though they do not have a lot of range.     Objective   See Exercise, Manual, and Modality Logs for complete treatment.     Assessment/Plan  Continued to encourage use of R UE and stretching for greater range. Patient will continue to benefit from skilled physical therapy to further address their deficits in strength and ROM in order to improve upon their functional mobility and to return to ADLs w/o restrictions.          Timed:  Manual Therapy:   15     mins  03187;  Therapeutic Exercise:    8     mins  11476;     Neuromuscular Josh:   0    mins  18084;    Therapeutic Activity:     8     mins  48869;     Gait Trainin     mins  61011;     Aquatics                         0      mins  89595  Self Care                        0      mins  31876    Un-timed:  Mechanical Traction      0     mins  12598  Electrical Stimulation:    0     mins  23706 ( );      Timed Treatment:   31   mins   Total Treatment:     31   mins    Mauricio Valentine PT, DPT    Electronically signed 2025    [place] License: PT - [id number]

## 2025-06-26 ENCOUNTER — TREATMENT (OUTPATIENT)
Dept: PHYSICAL THERAPY | Facility: CLINIC | Age: 45
End: 2025-06-26
Payer: COMMERCIAL

## 2025-06-26 DIAGNOSIS — G89.29 CHRONIC RIGHT SHOULDER PAIN: Primary | ICD-10-CM

## 2025-06-26 DIAGNOSIS — M25.611 DECREASED ROM OF RIGHT SHOULDER: ICD-10-CM

## 2025-06-26 DIAGNOSIS — M62.81 MUSCLE WEAKNESS OF RIGHT ARM: ICD-10-CM

## 2025-06-26 DIAGNOSIS — M25.511 CHRONIC RIGHT SHOULDER PAIN: Primary | ICD-10-CM

## 2025-06-26 NOTE — PROGRESS NOTES
"Outpatient Physical Therapy                   Physical Therapy Daily Treatment Note    Patient: George Hunter   : 1980  Diagnosis/ICD-10 Code:  Chronic right shoulder pain [M25.511, G89.29]  Referring practitioner: Jody Hou MD  Date of Initial Visit: Type: THERAPY  Noted: 2025  Today's Date: 2025  Patient seen for 8 sessions             Subjective   George Hunter reports: he has been more tired this week than usual. He did some swimming last week to exercise his arm; he couldn't tell if it helped much. He does note improvements in overhead ROM but its not as quick as his left shoulder as he has to focus on it more.     Pain: 0/10 pain, just \"uncomfortable tightness\" from sleeping on it    Objective     See Exercise, Manual, and Modality Logs for complete treatment.     Assessment/Plan  George demonstrated good tolerance to all functional UE strengthening. Exercises were progressed with good tolerance. PROM is progressing but George continues to have pain at end range as evident by facial wincing. Continue to progress with current PT plan of care per patient tolerance.       Progress per Plan of Care      Timed:  Manual Therapy:    9     mins  89954;  Therapeutic Exercise:    10     mins  89799;  Neuromuscular Josh:    0    mins  93830;  Therapeutic Activity:     10     mins  93660;  Self care:                       0     mins  41283;  Gait Trainin     mins  22733;  Ultrasound:                    0     mins  54511;    Untimed:  Mechanical Traction:    0     mins  48898;  Electrical Stimulation:    0     mins  01962 ( );      Timed Treatment:   29   mins  Total Treatment:     29   mins      Electronically signed:   Maddie Beavers PTA  Physical Therapist Assistant  [facility] License #: [id number]  "

## 2025-07-03 ENCOUNTER — TREATMENT (OUTPATIENT)
Dept: PHYSICAL THERAPY | Facility: CLINIC | Age: 45
End: 2025-07-03
Payer: COMMERCIAL

## 2025-07-03 DIAGNOSIS — M62.81 MUSCLE WEAKNESS OF RIGHT ARM: ICD-10-CM

## 2025-07-03 DIAGNOSIS — G89.29 CHRONIC RIGHT SHOULDER PAIN: Primary | ICD-10-CM

## 2025-07-03 DIAGNOSIS — M25.511 CHRONIC RIGHT SHOULDER PAIN: Primary | ICD-10-CM

## 2025-07-03 DIAGNOSIS — M25.611 DECREASED ROM OF RIGHT SHOULDER: ICD-10-CM

## 2025-07-03 NOTE — PROGRESS NOTES
"Progress Note  [facility] [address] [place] [address]      Patient: George Hunter   : 1980  Diagnosis/ICD-10 Code:  Chronic right shoulder pain [M25.511, G89.29]  Referring practitioner: Jody Hou MD  Date of Initial Visit: Type: THERAPY  Noted: 2025  Today's Date: 7/3/2025  Patient seen for 9 sessions      Subjective:   Subjective Questionnaire: QuickDASH: 22   Clinical Progress: improved  Home Program Compliance: Yes  Treatment has included: therapeutic exercise, neuromuscular re-education, manual therapy, and therapeutic activity    Subjective     Pt reports that he has a little bit more ROM in the R arm, but he does have to force it. Pt reports that his pain 1-2/10 today. Pt has a f/u with orthopedics on 25.    Objective   Joint Play      Right Shoulder  Hypomobile in the posterior capsule and inferior capsule.       Active Range of Motion   Left Shoulder   Flexion: 145 degrees   Abduction: 125 degrees   External rotation BTH: T3   Internal rotation BTB: T12      Right Shoulder   Flexion: 110 degrees with pain  Abduction: 92 degrees with pain  External rotation BTH: C7 with pain   (42 degrees in supine at 45 deg abd)  Internal rotation BTB: S1 with pain    (59 degrees in supine at 45 deg abd) (L5 after stretching and posterior glide)     \"Feels like something is there, stopping me from doing it, like a cog.\"       Passive Range of Motion      Right Shoulder   Flexion: 140 degrees with pain  Abduction: 133 degrees with pain  External rotation 45°: 70 degrees   Internal rotation 45°: 65 degrees (shoulder not blocked)     Additional Passive Range of Motion Details  Muscle guarding at top of motion.      Strength/Myotome Testing      Left Shoulder      Planes of Motion   Flexion: 5   Abduction: 5   External rotation at 0°: 5   Internal rotation at 0°: 5      Isolated Muscles   Biceps: 5   Triceps: 5      Right Shoulder      Planes of Motion   Flexion: 5  Abduction: 4+ "   External rotation at 0°: 5  Internal rotation at 0°: 5     Isolated Muscles   Biceps: 5  Triceps: 4+      See Exercise, Manual, and Modality Logs for complete treatment.     Assessment/Plan  Pt originally presented to therapy w/ complaints of R shoulder pain. Today was pt's first reassessment day. Pt has made good progress since beginning physical therapy. He has exhibited improvements in R shoulder AROM, PROM and strength. However, his shoulder continues to be significantly stiff. Pt continues to have pain that radiates down his arm with certain movements overhead or behind the back. He continues to feel like his shoulder is getting stuck. After R shoulder stretching with posterior mobilizations, his IR behind his back was only slightly imp[roved and his ER behind his head was unchanged. He is still significantly restricted with performing overhead tasks and ADLs. Pt has a f/u with orthopedics on 7/17/25. Pt will benefit from continued skilled PT to improve upon their functional mobility and to return to daily tasks w/o restrictions. Continue POC.            Goals  Plan Goals: SHOULDER  PROBLEMS:      1. The patient has limited AROM of the R shoulder.                  LTG 1: 12 weeks:  The patient will demonstrate 145 degrees of R shoulder flexion, 145 degrees of shoulder abduction, to T3 with overhead external rotation, and to T12 with behind their back internal rotation to allow the patient to perform all dressing tasks without restrictions.                          STATUS:  progressing               STG 1a: 6 weeks:  The patient will demonstrate 120 degrees of R shoulder flexion, 120 degrees of shoulder abduction, to C5 with overhead external rotation, and to L4 with behind their back internal rotation to allow the patient to perform all dressing tasks without restrictions.                          STATUS:  progressing      2. The patient has limited strength of the R shoulder.              LTG 2: 12 weeks:   The patient will demonstrate 5 /5 strength for R shoulder flexion, abduction, external rotation, and internal rotation in order to demonstrate improved shoulder stability.                          STATUS:  progressing               STG 2a: 6 weeks:  The patient will demonstrate 4+ /5 strength for R shoulder flexion, abduction, external rotation, and internal rotation.                          STATUS:  MET              STG2b:  6 weeks:  The patient will be independent with home exercises.                           STATUS:  MET                3. The patient complains of pain to the R shoulder.              LTG 3:12 weeks:  The patient will report a pain rating of 1 /10 or better in order to improve sleep quality and tolerance to performance of activities of daily living.                          STATUS:  MET              STG 3a: 6 weeks:  The patient will report a pain rating of 2 /10 or better.                           STATUS:  MET            4. Carrying, Moving, and Handling Objects Functional Limitation                               LTG 4: 12 weeks:  The patient will demonstrate 5 % limitation by achieving a score of 13 on the QuickDASH.                          STATUS:  progressing              STG 4a: 6 weeks:  The patient will demonstrate 20 % limitation by achieving a score of 20 on the QuickDASH.                            STATUS:  progressing     Progress toward previous goals: Partially Met      Recommendations: Continue as planned  Timeframe: 2 weeks  Prognosis to achieve goals: good    Signature: Marta Quintana PT    Electronically signed 7/3/2025    [place] License: PT - [id number]      Timed:  Manual Therapy:    8     mins  44267;  Therapeutic Exercise:    0     mins  51824;     Neuromuscular Josh:    0    mins  00417;    Therapeutic Activity:     23     mins  64358;     Gait Trainin     mins  03193;     Aquatics                         0      mins  58288    Un-timed:  Mechanical Traction      0      mins  46026  Dry Needling     0     mins self-pay  Electrical Stimulation:    0     mins  83726 ( );    Timed Treatment:   31   mins   Total Treatment:     31   mins

## 2025-07-04 DIAGNOSIS — E03.9 ACQUIRED HYPOTHYROIDISM: ICD-10-CM

## 2025-07-07 RX ORDER — LEVOTHYROXINE SODIUM 25 UG/1
25 TABLET ORAL DAILY
Qty: 30 TABLET | Refills: 0 | Status: SHIPPED | OUTPATIENT
Start: 2025-07-07

## 2025-07-07 NOTE — TELEPHONE ENCOUNTER
Rx Refill Note  Requested Prescriptions     Pending Prescriptions Disp Refills    Euthyrox 25 MCG tablet [Pharmacy Med Name: Euthyrox 25 MCG Oral Tablet] 30 tablet 0     Sig: Take 1 tablet by mouth once daily      Last office visit with prescribing clinician: 4/2/2025     Next office visit with prescribing clinician: 7/23/2025     Georgina Cohen MA  07/07/25, 08:53 EDT

## 2025-07-10 ENCOUNTER — TREATMENT (OUTPATIENT)
Dept: PHYSICAL THERAPY | Facility: CLINIC | Age: 45
End: 2025-07-10
Payer: COMMERCIAL

## 2025-07-10 DIAGNOSIS — M25.511 CHRONIC RIGHT SHOULDER PAIN: Primary | ICD-10-CM

## 2025-07-10 DIAGNOSIS — M62.81 MUSCLE WEAKNESS OF RIGHT ARM: ICD-10-CM

## 2025-07-10 DIAGNOSIS — G89.29 CHRONIC RIGHT SHOULDER PAIN: Primary | ICD-10-CM

## 2025-07-10 DIAGNOSIS — M25.611 DECREASED ROM OF RIGHT SHOULDER: ICD-10-CM

## 2025-07-10 PROCEDURE — 97530 THERAPEUTIC ACTIVITIES: CPT | Performed by: PHYSICAL THERAPIST

## 2025-07-10 PROCEDURE — 97110 THERAPEUTIC EXERCISES: CPT | Performed by: PHYSICAL THERAPIST

## 2025-07-10 NOTE — PROGRESS NOTES
"Outpatient Physical Therapy                   Physical Therapy Daily Treatment Note    Patient: George Hunter   : 1980  Diagnosis/ICD-10 Code:  Chronic right shoulder pain [M25.511, G89.29]  Referring practitioner: Jody Hou MD  Date of Initial Visit: Type: THERAPY  Noted: 2025  Today's Date: 7/10/2025  Patient seen for 10 sessions             Subjective   George Hunter reports: 1/10 pain, \"not really painful but it is really uncomfortable\"    Objective     See Exercise, Manual, and Modality Logs for complete treatment.     Assessment/Plan  George demonstrated good tolerance to all functional UE strengthening. Continue to progress with current PT plan of care per patient tolerance.       Progress per Plan of Care      Timed:  Manual Therapy:    0     mins  44905;  Therapeutic Exercise:    23     mins  43241;  Neuromuscular Josh:    0    mins  81224;  Therapeutic Activity:     8     mins  67724;  Self care:                       0     mins  34282;  Gait Trainin     mins  73903;  Ultrasound:                    0     mins  22704;    Untimed:  Mechanical Traction:    0     mins  99592;  Electrical Stimulation:    0     mins  49121 ( );      Timed Treatment:   31   mins  Total Treatment:     31   mins      Electronically signed:   Maddie Beavers PTA  Physical Therapist Assistant  [facility] License #: [id number]  "

## 2025-07-11 ENCOUNTER — TREATMENT (OUTPATIENT)
Dept: PHYSICAL THERAPY | Facility: CLINIC | Age: 45
End: 2025-07-11
Payer: COMMERCIAL

## 2025-07-11 DIAGNOSIS — M25.511 CHRONIC RIGHT SHOULDER PAIN: Primary | ICD-10-CM

## 2025-07-11 DIAGNOSIS — M62.81 MUSCLE WEAKNESS OF RIGHT ARM: ICD-10-CM

## 2025-07-11 DIAGNOSIS — G89.29 CHRONIC RIGHT SHOULDER PAIN: Primary | ICD-10-CM

## 2025-07-11 DIAGNOSIS — M25.611 DECREASED ROM OF RIGHT SHOULDER: ICD-10-CM

## 2025-07-11 PROCEDURE — 97530 THERAPEUTIC ACTIVITIES: CPT

## 2025-07-11 PROCEDURE — 97110 THERAPEUTIC EXERCISES: CPT

## 2025-07-11 NOTE — PROGRESS NOTES
Outpatient Physical Therapy                   Physical Therapy Daily Treatment Note    Patient: George Hunter   : 1980  Diagnosis/ICD-10 Code:  Chronic right shoulder pain [M25.511, G89.29]  Referring practitioner: Jody Hou MD  Date of Initial Visit: Type: THERAPY  Noted: 2025  Today's Date: 2025  Patient seen for 11 sessions             Subjective   George Hunter reports: he is sore from yesterdays exercises.     Objective     See Exercise, Manual, and Modality Logs for complete treatment.     Assessment/Plan  Pt was fatigued by exercises today but showed improvement with improved mobility with prone T's and Y's. Pt was especially by challenged by exercises at the end of the session.     Progress per Plan of Care      Timed:  Manual Therapy:    0     mins  96068;  Therapeutic Exercise:    18     mins  59579;  Neuromuscular Josh:    0    mins  57104;  Therapeutic Activity:     11     mins  40256;  Self care:                       0     mins  55192;  Gait Trainin     mins  54784;  Ultrasound:                    0     mins  41746;    Untimed:  Mechanical Traction:    0     mins  47324;  Electrical Stimulation:    0     mins  62220 ( );      Timed Treatment:   29   mins  Total Treatment:     29   mins      Electronically signed:   Maddie Beavers PTA  Physical Therapist Assistant  [facility] License #: [id number]

## 2025-07-17 ENCOUNTER — OFFICE VISIT (OUTPATIENT)
Dept: ORTHOPEDIC SURGERY | Facility: CLINIC | Age: 45
End: 2025-07-17
Payer: COMMERCIAL

## 2025-07-17 ENCOUNTER — TELEPHONE (OUTPATIENT)
Dept: ENDOCRINOLOGY | Facility: CLINIC | Age: 45
End: 2025-07-17
Payer: COMMERCIAL

## 2025-07-17 ENCOUNTER — PREP FOR SURGERY (OUTPATIENT)
Dept: OTHER | Facility: HOSPITAL | Age: 45
End: 2025-07-17
Payer: COMMERCIAL

## 2025-07-17 VITALS — BODY MASS INDEX: 29.95 KG/M2 | WEIGHT: 226 LBS | HEIGHT: 73 IN

## 2025-07-17 DIAGNOSIS — M75.01 ADHESIVE CAPSULITIS OF RIGHT SHOULDER: Primary | ICD-10-CM

## 2025-07-17 DIAGNOSIS — M75.01 ADHESIVE CAPSULITIS OF RIGHT SHOULDER: ICD-10-CM

## 2025-07-17 DIAGNOSIS — E03.9 ACQUIRED HYPOTHYROIDISM: Primary | ICD-10-CM

## 2025-07-17 DIAGNOSIS — M75.41 IMPINGEMENT SYNDROME OF RIGHT SHOULDER: ICD-10-CM

## 2025-07-17 DIAGNOSIS — M25.511 RIGHT SHOULDER PAIN, UNSPECIFIED CHRONICITY: Primary | ICD-10-CM

## 2025-07-17 NOTE — TELEPHONE ENCOUNTER
[facility] called states Euthyrox 25 mcg they are no longer able to get brand [facility] is there something else that can be called in

## 2025-07-18 ENCOUNTER — TREATMENT (OUTPATIENT)
Dept: PHYSICAL THERAPY | Facility: CLINIC | Age: 45
End: 2025-07-18
Payer: COMMERCIAL

## 2025-07-18 DIAGNOSIS — M62.81 MUSCLE WEAKNESS OF RIGHT ARM: ICD-10-CM

## 2025-07-18 DIAGNOSIS — M25.511 CHRONIC RIGHT SHOULDER PAIN: Primary | ICD-10-CM

## 2025-07-18 DIAGNOSIS — M25.611 DECREASED ROM OF RIGHT SHOULDER: ICD-10-CM

## 2025-07-18 DIAGNOSIS — G89.29 CHRONIC RIGHT SHOULDER PAIN: Primary | ICD-10-CM

## 2025-07-18 PROCEDURE — 97110 THERAPEUTIC EXERCISES: CPT

## 2025-07-18 PROCEDURE — 97530 THERAPEUTIC ACTIVITIES: CPT

## 2025-07-18 RX ORDER — LEVOTHYROXINE SODIUM 25 UG/1
25 TABLET ORAL
Qty: 90 TABLET | Refills: 0 | Status: SHIPPED | OUTPATIENT
Start: 2025-07-18

## 2025-07-18 NOTE — PROGRESS NOTES
Chief complaint/Reason for consult: T1DM and hypothyroidism    HPI: Mr. Hunter is a 44yoM with T1DM. hyperlipidemia and hypothyroidism who comes for follow-up of T1DM.  He was last seen 3/12/2025 and reports since that time ongoing shoulder issues.  Had a steroid injection which led to hyperglycemia for several weeks, this seems to have improved.  Plans to have procedural manipulation in a few weeks.     # [id number], Uncontrolled due to hyperglycemia and hypoglycemia without complications  - Diagnosed: 2000 when he was admitted to the hospital with DKA   - Current regimen includes: Tresiba (U-200) 48u daily and Novolog 1:10 CHO with ISF 40 (corrects over 160mg/dl)   - Compliance with medications is fair, occasionally late for prandial insulin  - HbA1c: 7.1% done today  - Has CGM    CGM Download  -Dates reviewed: 6/30/2025-7/13/2025  -Data: 99% wear time, average glucose 161 mg/dL, GMI 7.2%, glucose variability 37.4%, 6% very high, 27% high, 61% time in range, 4% low and 2% very low  -Interpretation:     - Hypoglycemia occurs with overcorrection of highs and over bolusing for foods  - Patient has symptoms with lows   - Hyperglycemia occurs worse with steroid injections, stress and poor diet   - Patient denies neuropathy   - Patient denies gastroparesis   - Patient reports rotating injection sites   - Patient without known ASCVD   - taking ACEi/ARB; blood pressure today 128/80     DM Health Maintenance:  Ophtho: 3/2024; retinal screening was negative   Monofilament / Foot exam: Completed 3/12/2025; has podiatry follow-up in a few weeks   Lipids/Statin: taking a statin with last FLP showing  done 3/12/2025  ZENAIDA: negative done  3/12/2025  TSH: 3.150 done 3/12/2025; takes levothyroxine 25mcg daily   Aspirin: not taking    Past medical history, past surgical history, family history and social history reviewed within this encounter.     Review of Systems   Constitutional:  Negative for activity change and unexpected  "weight change.   HENT:  Negative for trouble swallowing.    Eyes:  Negative for visual disturbance.   Respiratory:  Negative for shortness of breath.    Cardiovascular:  Negative for chest pain.   Gastrointestinal:  Negative for abdominal pain.   Endocrine: Negative for cold intolerance and heat intolerance.   Musculoskeletal:  Positive for arthralgias.   Skin:  Negative for rash.   Neurological:  Negative for numbness.   Psychiatric/Behavioral:  Negative for agitation.         /80 (BP Location: Right arm, Patient Position: Sitting, Cuff Size: Adult)   Pulse 74   Ht 185.4 cm (73\")   Wt 105 kg (231 lb 11.2 oz)   SpO2 95%   BMI 30.57 kg/m²      Physical Exam  Vitals reviewed.   Constitutional:       General: He is not in acute distress.     Appearance: He is obese.   HENT:      Head: Normocephalic.      Nose: Nose normal.   Eyes:      Conjunctiva/sclera: Conjunctivae normal.   Cardiovascular:      Rate and Rhythm: Normal rate.   Pulmonary:      Effort: Pulmonary effort is normal.   Abdominal:      Tenderness: There is no guarding.   Musculoskeletal:         General: No swelling.   Skin:     General: Skin is warm and dry.   Neurological:      Mental Status: He is alert and oriented to person, place, and time.   Psychiatric:         Mood and Affect: Mood normal.       Labs and images reviewed as noted in the HPI    Assessment and plan:    Diagnoses and all orders for this visit:    1. Type 1 diabetes mellitus with hyperglycemia (Primary)  Assessment & Plan:  -Uncontrolled due to hyperglycemia and hypoglycemia  -No known complications  -Has upcoming manipulation for frozen shoulder in a few weeks  -Recommend increasing physical activity and doing structured physical activity 30 minutes every day along with dietary improvement  -Continue to be active in daily life  -For now continue Tresiba (U-200) 48u daily but if he plans to increase his physical activity and improve his diet recommend reducing to 40 units " daily   -Continue Novolog 1:10 CHO with ISF 40 > 160mg/dl    -Continue freestyle nadine CGM  -Continue lisinopril; blood pressure today 128/80     DM Health Maintenance:  [facility] screening was negative, plans to schedule follow-up in the coming months  Monofilament / Foot exam: Completed 3/12/2025; has podiatry follow-up in a few weeks   Lipids/Statin: taking a statin with last FLP showing  done 3/12/2025  ZENAIDA: negative done  3/12/2025  TSH: 3.150 done 3/12/2025; takes levothyroxine 25mcg daily   Aspirin: not taking       Orders:  -     POC Glucose, Blood  -     POC Glycosylated Hemoglobin (Hb A1C)  -     Continuous Glucose Sensor (FreeStyle Nadine 3 Plus Sensor); Use Every 15 (Fifteen) Days.  Dispense: 6 each; Refill: 3  -     insulin aspart (NovoLOG FlexPen) 100 UNIT/ML solution pen-injector sc pen; Inject with meals 1:10 CHO and 1:40 > 160mg/dl; max daily dose 50u  Dispense: 45 mL; Refill: 3  -     Insulin Degludec FlexTouch 200 UNIT/ML solution pen-injector; Inject 40 Units under the skin into the appropriate area as directed Daily. Titrate for a max daily dose of 50u  Dispense: 30 mL; Refill: 3  -     Insulin Pen Needle 32G X 4 MM misc; Use with insulin as direct up to 5 times per day  Dispense: 400 each; Refill: 1    2. Acquired hypothyroidism  Assessment & Plan:  -Biochemically and clinically euthyroid on levothyroxine 25 mcg daily  -Previously discussed stopping thyroid medication to see if he remains euthyroid but he prefers to stay on a very low dose of levothyroxine  -TPO antibodies negative    Orders:  -     levothyroxine (SYNTHROID, LEVOTHROID) 25 MCG tablet; Take 1 tablet by mouth Every Morning.  Dispense: 90 tablet; Refill: 1    3. Pure hypercholesterolemia  Assessment & Plan:  -Continue simvastatin 20 mg daily  -Check fasting lipid panel later this year       Orders:  -     simvastatin (ZOCOR) 20 MG tablet; Take 1 tablet by mouth Every Night.  Dispense: 90 tablet; Refill: 1    4.  Essential hypertension  -     lisinopril (PRINIVIL,ZESTRIL) 5 MG tablet; Take 1 tablet by mouth Daily.  Dispense: 90 tablet; Refill: 2         Return in about 3 months (around 10/23/2025) for T1DM.     Please note that portions of this note may have been completed with a voice recognition program. Efforts were made to edit the dictations, but occasionally words are mistranscribed.     Electronically signed by: Kyle S Rosenstein, MD

## 2025-07-18 NOTE — PROGRESS NOTES
Chief Complaint  Follow-up and Pain of the Right Shoulder     Subjective        History of Present Illness  The patient is a 44-year-old male who presents for a follow-up on his right shoulder. He has been experiencing pain in his right shoulder for over a year. In the fall of 2024, he felt an unusual sensation in the shoulder while throwing a dog toy.  Physical therapy was delayed until February 2025 at which time he had severely decreased range of motion and use of the shoulder.  He has been in therapy for 2 separate referrals since February or March 2025. An MRI was performed, revealing no significant degenerative changes of the glenohumeral joint, mild AC joint osteoarthritis, no rotator cuff tear, a shallow bursal surface defect of the anterior supraspinatus, and some bursitis.  He had initial evaluation with Dr. Hou on 5/8/2025.  He was diagnosed with adhesive capsulitis and received a steroid injection at his initial evaluation, which provided some relief but also caused a spike in his blood sugar levels that lasted a month.     Today, he reports a mild improvement in mobility but continues to experience pain during certain movements, particularly when throwing objects. The pain is severe enough to cause him to lose his breath. He also experiences occasional pain in the back of his shoulder and down his arm. He has been managing by using his left arm more frequently.  He has been informed about the possibility of frozen shoulder by his therapist. He is open to discussing manipulation as a treatment option. He has been advised against taking anti-inflammatory medications due to his diabetes, but he occasionally takes ibuprofen. He believes he may have taken diclofenac in the past.        He is diabetic and has an appointment with his endocrinologist next week.    SOCIAL HISTORY  Occupations: Works from home 2 days a week      Allergies   Allergen Reactions    Penicillins Anaphylaxis     Childhood allergy    "   Codeine Itching        Social History     Socioeconomic History    Marital status:     Number of children: 1   Tobacco Use    Smoking status: Former     Average packs/day: 0.5 packs/day for 14.3 years (7.1 ttl pk-yrs)     Types: Cigarettes, Cigars     Start date: 7/23/1997     Passive exposure: Past    Smokeless tobacco: Never    Tobacco comments:     Quit 2011   Vaping Use    Vaping status: Never Used   Substance and Sexual Activity    Alcohol use: Not Currently    Drug use: Never    Sexual activity: Yes     Partners: Female        Tobacco Use: Medium Risk (7/17/2025)    Patient History     Smoking Tobacco Use: Former     Smokeless Tobacco Use: Never     Passive Exposure: Past     Patient reports they have a history of tobacco use; encouraged continued tobacco cessation for further health benefits.     I reviewed the patient's chief complaint, history of present illness, review of systems, past medical history, surgical history, family history, social history, medications, and allergy list.     Review of Systems     Constitutional: Denies fevers, chills, weight loss  Cardiovascular: Denies chest pain, shortness of breath  Skin: Denies rashes, acute skin changes  Neurologic: Denies headache, loss of consciousness    Vital Signs:   Ht 185.4 cm (73\")   Wt 103 kg (226 lb)   BMI 29.82 kg/m²          Physical Exam  General: Alert. No acute distress    Ortho Exam    Right upper extremity:  Patient demonstrates active flexion 100 degrees and 130 degrees if his elbow is bent and with forward flexion of elbow. Abduction such as 100 degrees. External rotation 30 degrees. IR to back pocket.  He has pain with all range of motion, especially overhead and lateral movement.  Demonstrates intact active elbow flexion and extension.  Demonstrates intact active wrist and finger range of motion.  Thumb opposition intact.  Palmar abduction of thumb intact.  Sensation intact to axillary, median, radial, and ulnar nerve " distributions.  Palpable radial pulse.    Physical Exam        Imaging Results (Most Recent)       Procedure Component Value Units Date/Time    XR Shoulder 2+ View Right [[id number]] Resulted: 07/17/25 0954     Updated: 07/17/25 0954    Narrative:      X-Ray Report:  Study: X-rays ordered, taken in the office, and reviewed today  Indication: Right shoulder pain  View: AP/Y lateral view(s)  Findings: No significant degenerative changes of the glenohumeral joint,   mild AC joint osteoarthritis.  The shoulder is reduced.  No acute   fractures.  Prior studies available for comparison: Yes               Result Review :       XR Shoulder 2+ View Right  Result Date: 7/17/2025  Narrative: X-Ray Report: Study: X-rays ordered, taken in the office, and reviewed today Indication: Right shoulder pain View: AP/Y lateral view(s) Findings: No significant degenerative changes of the glenohumeral joint, mild AC joint osteoarthritis.  The shoulder is reduced.  No acute fractures. Prior studies available for comparison: Yes              Assessment and Plan     Diagnoses and all orders for this visit:    1. Right shoulder pain, unspecified chronicity (Primary)  -     XR Shoulder 2+ View Right  -     Ambulatory Referral to Physical Therapy for Evaluation & Treatment    2. Adhesive capsulitis of right shoulder  -     Ambulatory Referral to Physical Therapy for Evaluation & Treatment    3. Impingement syndrome of right shoulder  -     Ambulatory Referral to Physical Therapy for Evaluation & Treatment    Other orders  -     diclofenac (VOLTAREN) 50 MG EC tablet; Take 1 tablet by mouth 2 (Two) Times a Day As Needed (pain).  Dispense: 60 tablet; Refill: 2      Case discussed with Dr. Moura also saw and evaluated patient.    He has had minimal improvement with physical therapy.  He has been dealing with this for several months.    Prescription for diclofenac will be sent through to the pharmacy.  He would like to wait until after manipulation  to see if he will even need this.    Patient is interested in manipulation.  Risks and benefits reviewed. We discussed surgical benefits and risks including damage to nerves, blood vessels, persistent pain, stiffness, anesthesia risk, and DVT/PE. He is aware of the need for daily physical therapy for 2 weeks postop beginning the day of the procedure.  He voices understanding and wishes to proceed with right shoulder manipulation with Dr. Hou at next available date.    Note for no further steroid injections in the future unless absolutely necessary as it negatively impacted his glucose levels for a month.    I will see him 2 weeks postop.  Assessment & Plan      Patient or patient representative verbalized consent for the use of Ambient Listening during the visit with  Cynthia Dahl PA-C for chart documentation. 7/18/2025  10:06 EDT    Follow Up   There are no Patient Instructions on file for this visit.        Patient was given instructions and counseling regarding his condition or for health maintenance advice. Please see specific information pulled into the AVS if appropriate.     Cynthia Dahl PA-C   07/17/2025  10:01 EDT        Dictated Utilizing Dragon Dictation. Please note that portions of this note were completed with a voice recognition program. Part of this note may be an electronic transcription/translation of spoken language to printed text using the Dragon Dictation System.

## 2025-07-18 NOTE — PROGRESS NOTES
Outpatient Physical Therapy                   Physical Therapy Daily Treatment Note    Patient: George Hunter   : 1980  Diagnosis/ICD-10 Code:  Chronic right shoulder pain [M25.511, G89.29]  Referring practitioner: Jody Hou MD  Date of Initial Visit: Type: THERAPY  Noted: 2025  Today's Date: 2025  Patient seen for 12 sessions             Subjective   George Hunter reports: he had an appointment yesterday and was scheduled for a manip on 2025.     Objective     See Exercise, Manual, and Modality Logs for complete treatment.     Assessment/Plan  George demonstrated good tolerance to all functional UE strengthening. Continue to progress with current PT plan of care per patient tolerance.       Progress per Plan of Care      Timed:  Manual Therapy:    0     mins  91706;  Therapeutic Exercise:    17     mins  48080;  Neuromuscular Josh:    0    mins  91970;  Therapeutic Activity:     8     mins  96001;  Self care:                       0     mins  83646;  Gait Trainin     mins  67378;  Ultrasound:                    0     mins  09708;    Untimed:  Mechanical Traction:    0     mins  21400;  Electrical Stimulation:    0     mins  59597 ( );      Timed Treatment:   25   mins  Total Treatment:     25   mins      Electronically signed:   Maddie Beavers PTA  Physical Therapist Assistant  [facility] License #: [id number]

## 2025-07-23 ENCOUNTER — TELEPHONE (OUTPATIENT)
Dept: ENDOCRINOLOGY | Facility: CLINIC | Age: 45
End: 2025-07-23
Payer: COMMERCIAL

## 2025-07-23 ENCOUNTER — OFFICE VISIT (OUTPATIENT)
Dept: ENDOCRINOLOGY | Facility: CLINIC | Age: 45
End: 2025-07-23
Payer: COMMERCIAL

## 2025-07-23 VITALS
DIASTOLIC BLOOD PRESSURE: 80 MMHG | WEIGHT: 231.7 LBS | HEART RATE: 74 BPM | OXYGEN SATURATION: 95 % | BODY MASS INDEX: 30.71 KG/M2 | SYSTOLIC BLOOD PRESSURE: 128 MMHG | HEIGHT: 73 IN

## 2025-07-23 DIAGNOSIS — E10.65 TYPE 1 DIABETES MELLITUS WITH HYPERGLYCEMIA: Primary | ICD-10-CM

## 2025-07-23 DIAGNOSIS — I10 ESSENTIAL HYPERTENSION: ICD-10-CM

## 2025-07-23 DIAGNOSIS — E03.9 ACQUIRED HYPOTHYROIDISM: ICD-10-CM

## 2025-07-23 DIAGNOSIS — E78.00 PURE HYPERCHOLESTEROLEMIA: ICD-10-CM

## 2025-07-23 LAB
EXPIRATION DATE: ABNORMAL
EXPIRATION DATE: ABNORMAL
GLUCOSE BLDC GLUCOMTR-MCNC: 132 MG/DL (ref 70–130)
HBA1C MFR BLD: 7.1 % (ref 4.5–5.7)
Lab: ABNORMAL
Lab: ABNORMAL

## 2025-07-23 RX ORDER — HYDROCHLOROTHIAZIDE 12.5 MG/1
CAPSULE ORAL
Qty: 6 EACH | Refills: 3 | Status: SHIPPED | OUTPATIENT
Start: 2025-07-23

## 2025-07-23 RX ORDER — LISINOPRIL 5 MG/1
5 TABLET ORAL DAILY
Qty: 90 TABLET | Refills: 2 | Status: SHIPPED | OUTPATIENT
Start: 2025-07-23

## 2025-07-23 RX ORDER — INSULIN ASPART 100 [IU]/ML
INJECTION, SOLUTION INTRAVENOUS; SUBCUTANEOUS
Qty: 45 ML | Refills: 3 | Status: SHIPPED | OUTPATIENT
Start: 2025-07-23

## 2025-07-23 RX ORDER — SIMVASTATIN 20 MG
20 TABLET ORAL NIGHTLY
Qty: 90 TABLET | Refills: 1 | Status: SHIPPED | OUTPATIENT
Start: 2025-07-23

## 2025-07-23 RX ORDER — LEVOTHYROXINE SODIUM 25 UG/1
25 TABLET ORAL
Qty: 90 TABLET | Refills: 1 | Status: SHIPPED | OUTPATIENT
Start: 2025-07-23

## 2025-07-23 RX ORDER — INSULIN DEGLUDEC 200 U/ML
40 INJECTION, SOLUTION SUBCUTANEOUS DAILY
Qty: 30 ML | Refills: 3 | Status: SHIPPED | OUTPATIENT
Start: 2025-07-23

## 2025-07-23 NOTE — TELEPHONE ENCOUNTER
David from the phar call and wanted directed to the pts insulin. I gave him Inject with meals 1:10 CHO and 1:40 > 160mg/dl; max daily dose 50u   He stated he just wanted to verify

## 2025-07-23 NOTE — ASSESSMENT & PLAN NOTE
-Biochemically and clinically euthyroid on levothyroxine 25 mcg daily  -Previously discussed stopping thyroid medication to see if he remains euthyroid but he prefers to stay on a very low dose of levothyroxine  -TPO antibodies negative

## 2025-07-23 NOTE — ASSESSMENT & PLAN NOTE
-Uncontrolled due to hyperglycemia and hypoglycemia  -No known complications  -Has upcoming manipulation for frozen shoulder in a few weeks  -Recommend increasing physical activity and doing structured physical activity 30 minutes every day along with dietary improvement  -Continue to be active in daily life  -For now continue Tresiba (U-200) 48u daily but if he plans to increase his physical activity and improve his diet recommend reducing to 40 units daily   -Continue Novolog 1:10 CHO with ISF 40 > 160mg/dl    -Continue freestyle neal CGM  -Continue lisinopril; blood pressure today 128/80     DM Health Maintenance:  Ophtho: 3/2024; retinal screening was negative, plans to schedule follow-up in the coming months  Monofilament / Foot exam: Completed 3/12/2025; has podiatry follow-up in a few weeks   Lipids/Statin: taking a statin with last FLP showing  done 3/12/2025  ZENAIDA: negative done  3/12/2025  TSH: 3.150 done 3/12/2025; takes levothyroxine 25mcg daily   Aspirin: not taking

## 2025-07-24 ENCOUNTER — TREATMENT (OUTPATIENT)
Dept: PHYSICAL THERAPY | Facility: CLINIC | Age: 45
End: 2025-07-24
Payer: COMMERCIAL

## 2025-07-24 ENCOUNTER — TELEPHONE (OUTPATIENT)
Dept: ORTHOPEDIC SURGERY | Facility: CLINIC | Age: 45
End: 2025-07-24
Payer: COMMERCIAL

## 2025-07-24 DIAGNOSIS — M25.611 DECREASED ROM OF RIGHT SHOULDER: ICD-10-CM

## 2025-07-24 DIAGNOSIS — M62.81 MUSCLE WEAKNESS OF RIGHT ARM: ICD-10-CM

## 2025-07-24 DIAGNOSIS — M25.511 CHRONIC RIGHT SHOULDER PAIN: Primary | ICD-10-CM

## 2025-07-24 DIAGNOSIS — G89.29 CHRONIC RIGHT SHOULDER PAIN: Primary | ICD-10-CM

## 2025-07-24 NOTE — TELEPHONE ENCOUNTER
Caller: SUZY CHRISTOPHER  Relationship: SELF  Best call back number: [phone]  What was the call regarding: PT IS REQUESTING A NOTE STATING HE WILL NEED TO WOK FROM [employer] TO GO TO HIS PHYSICAL THERAPY 5X A WEEK. HE IS REQUESTING THE NOTE COVER FROM SURGERY UNTIL AROUND 9/30. HE WORKS IN [place] AND WOULD BE UNABLE TO MAKE HIS PHYSICAL THERAPY IF HE HAD TO GO IN OFFICE.  PLEASE ADVISE

## 2025-07-24 NOTE — PROGRESS NOTES
Physical Therapy Daily Treatment Note                      [address] [place] [address]    Patient: George Hunter   : 1980  Diagnosis/ICD-10 Code:  Chronic right shoulder pain [M25.511, G89.29]  Referring practitioner: Jody Hou MD  Date of Initial Visit: Type: THERAPY  Noted: 2025  Today's Date: 2025  Patient seen for 13 sessions           Subjective     History of Present Illness  The patient is a 44-year-old male who presents for right shoulder pain.    He reports no current pain but experiences discomfort in his right shoulder, which he describes as a sensation of needing intervention. An incident of severe pain occurred recently when he bent down to  an object, causing him to lie down on the floor. He also mentions a pinching sensation in his shoulder during certain movements. He has been informed about the post-surgery recovery process and has requested a note for his employer regarding his upcoming physical therapy sessions. He plans to work from home more frequently during this period. He recalls that acupuncture was beneficial for his other shoulder issue.    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  Sports/Recreational Activities/Hobbies: Played baseball.  Person(s) Patient Resides with: 5-year-old child.       Patient or patient representative verbalized consent for the use of Ambient Listening during the visit with  Barry Thomas PT for chart documentation. 2025  08:24 EDT    Objective    Joint Play      Right Shoulder  Hypomobile in the posterior capsule and inferior capsule.       Active Range of Motion   Left Shoulder   Flexion: 145 degrees   Abduction: 125 degrees   External rotation BTH: T3   Internal rotation BTB: T12      Right Shoulder   Flexion: 110 degrees with pain  Abduction: 95 degrees with pain  External rotation BTH: C7 with pain   (42 degrees in supine at 45 deg abd)  Internal rotation BTB: S1 with pain    (59 degrees in supine at 45 deg abd) (L5 after stretching and posterior glide)      Strength/Myotome Testing      Left Shoulder      Planes of Motion   Flexion: 5   Abduction: 5   External rotation at 0°: 5   Internal rotation at 0°: 5      Isolated Muscles   Biceps: 5   Triceps: 5      Right Shoulder      Planes of Motion   Flexion: 5  Abduction: 4+   External rotation at 0°: 5  Internal rotation at 0°: 5     Isolated Muscles   Biceps: 5  Triceps: 4+        See Exercise, Manual, and Modality Logs for complete treatment.       Assessment/Plan    Assessment & Plan  1. Right shoulder pain.  The patient reports discomfort rather than pain in the right shoulder, with soreness reaching up to a 6/10 when performing certain activities. Significant pain is experienced during tasks such as bending down or throwing. A pinching sensation in the shoulder suggests possible nerve involvement. Active range of motion has improved compared to the initial evaluation but has plateaued recently. Strength is good when the arm is by the side but weakens when the arm is extended forward. The plan moving forward includes manipulation. A scheduled visit for next Thursday has been canceled, and no further therapy sessions will be scheduled until after the surgery. Post-surgery, aggressive stretching therapy will be initiated to regain full range of motion.    Education: The patient was informed about the post-surgery therapy plan, which includes aggressive stretching every day for 2 weeks to regain full  range of motion. The patient was also educated on the expected healing timeline, with the possibility of needing additional therapy for a couple more weeks to fine-tune the range of motion.            Timed:  Manual Therapy:    0     mins  62706;  Therapeutic Exercise:    24     mins  97185;     Neuromuscular Josh:   0    mins  18084;    Therapeutic Activity:     8     mins  46460;     Gait Trainin     mins  29336;     Aquatics                         0      mins  81139    Un-timed:  Mechanical Traction      0     mins  87471  Dry Needling     0     mins self-pay  Electrical Stimulation:    0     mins  96047 ( );      Timed Treatment:   32   mins   Total Treatment:     32   mins    Barry Thomas PT  Physical Therapist    Electronically signed 2025    [place] License: PT - [id number]

## 2025-07-28 RX ORDER — OMEPRAZOLE 40 MG/1
40 CAPSULE, DELAYED RELEASE ORAL DAILY
Qty: 90 CAPSULE | Refills: 0 | Status: SHIPPED | OUTPATIENT
Start: 2025-07-28

## 2025-08-01 ENCOUNTER — HOSPITAL ENCOUNTER (EMERGENCY)
Facility: HOSPITAL | Age: 45
Discharge: HOME | End: 2025-08-01
Attending: EMERGENCY MEDICINE
Payer: COMMERCIAL

## 2025-08-01 VITALS
HEIGHT: 73 IN | HEART RATE: 74 BPM | SYSTOLIC BLOOD PRESSURE: 125 MMHG | BODY MASS INDEX: 31.56 KG/M2 | DIASTOLIC BLOOD PRESSURE: 71 MMHG | OXYGEN SATURATION: 94 % | RESPIRATION RATE: 18 BRPM | TEMPERATURE: 98.1 F | WEIGHT: 238.1 LBS

## 2025-08-01 DIAGNOSIS — H92.02 ACUTE OTALGIA, LEFT: Primary | ICD-10-CM

## 2025-08-01 DIAGNOSIS — K08.89 PAIN, DENTAL: ICD-10-CM

## 2025-08-01 DIAGNOSIS — J34.89 SINUS PAIN: ICD-10-CM

## 2025-08-01 PROCEDURE — 99283 EMERGENCY DEPT VISIT LOW MDM: CPT

## 2025-08-01 RX ORDER — DOXYCYCLINE 100 MG/1
100 CAPSULE ORAL ONCE
Status: COMPLETED | OUTPATIENT
Start: 2025-08-01 | End: 2025-08-01

## 2025-08-01 RX ORDER — DOXYCYCLINE 100 MG/1
100 CAPSULE ORAL 2 TIMES DAILY
Qty: 20 CAPSULE | Refills: 0 | Status: SHIPPED | OUTPATIENT
Start: 2025-08-01

## 2025-08-01 RX ORDER — IBUPROFEN 600 MG/1
600 TABLET, FILM COATED ORAL EVERY 6 HOURS PRN
Qty: 30 TABLET | Refills: 0 | Status: SHIPPED | OUTPATIENT
Start: 2025-08-01

## 2025-08-01 RX ORDER — IBUPROFEN 600 MG/1
600 TABLET, FILM COATED ORAL ONCE
Status: COMPLETED | OUTPATIENT
Start: 2025-08-01 | End: 2025-08-01

## 2025-08-01 RX ADMIN — DOXYCYCLINE 100 MG: 100 CAPSULE ORAL at 05:32

## 2025-08-01 RX ADMIN — IBUPROFEN 600 MG: 600 TABLET, FILM COATED ORAL at 05:32

## 2025-08-01 NOTE — ED PROVIDER NOTES
"SHARED VISIT ATTESTATION:    This visit was performed by myself and an APC.  I personally approved the management plan/medical decision making and take responsibility for the patient management.      SHARED VISIT NOTE:    Patient is 45 y.o. year old male that presents to the ED for evaluation of facial pain.     Physical Exam    ED Course:    /88   Pulse 68   Temp 98.1 °F (36.7 °C) (Oral)   Resp 16   Ht 185.4 cm (73\")   Wt 108 kg (238 lb 1.6 oz)   SpO2 94%   BMI 31.41 kg/m²       The following orders were placed and all results were independently analyzed by me:  No orders of the defined types were placed in this encounter.      Medications Given in the Emergency Department:  Medications   doxycycline (MONODOX) capsule 100 mg (100 mg Oral Given 8/1/25 0532)   ibuprofen (ADVIL,MOTRIN) tablet 600 mg (600 mg Oral Given 8/1/25 0532)        ED Course:         Labs:    Lab Results (last 24 hours)       ** No results found for the last 24 hours. **             Imaging:    No Radiology Exams Resulted Within Past 24 Hours    MDM:    Procedures                         Herbert Goodrich MD  05:33 EDT  08/01/25         Herbert Goodrich MD  08/01/25 0612    "

## 2025-08-01 NOTE — ED PROVIDER NOTES
Time: 4:56 AM EDT  Date of encounter:  8/1/2025  Independent Historian/Clinical History and Information was obtained by:   Patient    History is limited by: N/A    Chief Complaint: Left facial jaw and ear pain      History of Present Illness:  Patient is a 45 y.o. year old male who presents to the emergency department for evaluation of pain in the patient's left ear jaw and face.  Patient is unsure if he has ear infection sinus infection or maybe something from his tooth but is having sharp pain in his left side of the jaw and face.  Dull and achy and then randomly will be sharp.  Patient has some mild nasal congestion.  Patient reports chronic dental issues no new trauma.  Patient denies any change in hearing or recent swimming or other significant bodies.  No sore throat.  No fever.  No headache other than his left f face.  Patient denies fever      Patient Care Team  Primary Care Provider: Jak Colindres APRN    Past Medical History:     Allergies   Allergen Reactions    Penicillins Anaphylaxis     Childhood allergy      Codeine Itching     Past Medical History:   Diagnosis Date    Allergic     Allergies     Ankle sprain 1987    Multiple times    Anxiety     Back injury     Broken bones     Deafness     Diabetes     Diabetes mellitus type I     Foot pain, bilateral 2018    Fracture     Fracture of wrist 2005    Hammertoe     Head injury     Hernia cerebri     Hyperlipidemia     Hypothyroidism     Knee sprain 1995    Neuropathy in diabetes 2009    Plantar fascial fibromatosis of both feet 2018    Rotator cuff syndrome 9/2024    Toe pain, right      Past Surgical History:   Procedure Laterality Date    COLONOSCOPY  2017    HERNIA REPAIR      TOENAIL EXCISION  2021     Family History   Family history unknown: Yes       Home Medications:  Prior to Admission medications    Medication Sig Start Date End Date Taking? Authorizing Provider   Continuous Glucose Sensor (FreeStyle Nadine 3 Plus Sensor) Use Every 15 (Fifteen)  Days. 7/23/25   Rosenstein, Kyle S, MD   diclofenac (VOLTAREN) 50 MG EC tablet Take 1 tablet by mouth 2 (Two) Times a Day As Needed (pain). 7/17/25   Cynthia Dahl PA-C   insulin aspart (NovoLOG FlexPen) 100 UNIT/ML solution pen-injector sc pen Inject with meals 1:10 CHO and 1:40 > 160mg/dl; max daily dose 50u 7/23/25   Rosenstein, Kyle S, MD   Insulin Degludec FlexTouch 200 UNIT/ML solution pen-injector Inject 40 Units under the skin into the appropriate area as directed Daily. Titrate for a max daily dose of 50u 7/23/25   Rosenstein, Kyle S, MD   Insulin Pen Needle 32G X 4 MM misc Use with insulin as direct up to 5 times per day 7/23/25   Rosenstein, Kyle S, MD   levothyroxine (SYNTHROID, LEVOTHROID) 25 MCG tablet Take 1 tablet by mouth Every Morning. 7/23/25   Rosenstein, Kyle S, MD   lisinopril (PRINIVIL,ZESTRIL) 5 MG tablet Take 1 tablet by mouth Daily. 7/23/25   Rosenstein, Kyle S, MD   montelukast (Singulair) 10 MG tablet Take 1 tablet by mouth Every Night. 6/5/24   Nia Rodas APRN   omeprazole (priLOSEC) 40 MG capsule TAKE 1 CAPSULE BY MOUTH ONCE DAILY BEFORE A MEAL 7/28/25   Jak Colindres APRN   ondansetron ODT (ZOFRAN-ODT) 4 MG disintegrating tablet Place 2 tablets on the tongue Every 8 (Eight) Hours As Needed for Nausea or Vomiting. 6/16/25   Carlos Cedeno MD   simvastatin (ZOCOR) 20 MG tablet Take 1 tablet by mouth Every Night. 7/23/25   Rosenstein, Kyle S, MD        Social History:   Social History     Tobacco Use    Smoking status: Former     Average packs/day: 0.5 packs/day for 14.3 years (7.1 ttl pk-yrs)     Types: Cigarettes, Cigars     Start date: 7/23/1997     Passive exposure: Past    Smokeless tobacco: Never    Tobacco comments:     Quit 2011   Vaping Use    Vaping status: Never Used   Substance Use Topics    Alcohol use: Not Currently    Drug use: Never         Review of Systems:  Review of Systems   Constitutional:  Negative for chills and fever.   HENT:  Positive for  "congestion, dental problem, ear pain and sinus pressure. Negative for facial swelling and sore throat.    Eyes:  Negative for pain.   Respiratory:  Negative for cough, chest tightness and shortness of breath.    Cardiovascular:  Negative for chest pain.   Gastrointestinal:  Negative for abdominal pain, diarrhea, nausea and vomiting.   Genitourinary:  Negative for flank pain and hematuria.   Musculoskeletal:  Negative for joint swelling.   Skin:  Negative for pallor.   Neurological:  Negative for seizures and headaches.   All other systems reviewed and are negative.       Physical Exam:  /88   Pulse 68   Temp 98.1 °F (36.7 °C) (Oral)   Resp 16   Ht 185.4 cm (73\")   Wt 108 kg (238 lb 1.6 oz)   SpO2 94%   BMI 31.41 kg/m²     Physical Exam  Vitals and nursing note reviewed.   Constitutional:       General: He is not in acute distress.     Appearance: Normal appearance. He is not toxic-appearing.   HENT:      Head: Normocephalic and atraumatic.      Right Ear: Tympanic membrane, ear canal and external ear normal.      Left Ear: Tympanic membrane, ear canal and external ear normal.      Nose: Congestion present.      Mouth/Throat:      Mouth: Mucous membranes are moist.      Comments: Mild dental decay with no focal areas of abscess.    Mild maxillary tenderness but no appreciable swelling or cellulitis    No parotiditis    No malocclusion.    No trismus    Mild tenderness at TMJ  Eyes:      General: No scleral icterus.     Conjunctiva/sclera: Conjunctivae normal.   Cardiovascular:      Rate and Rhythm: Normal rate and regular rhythm.      Heart sounds: Normal heart sounds.   Pulmonary:      Effort: Pulmonary effort is normal. No respiratory distress.      Breath sounds: Normal breath sounds.   Abdominal:      Tenderness: There is no abdominal tenderness.   Musculoskeletal:         General: Normal range of motion.      Cervical back: Normal range of motion and neck supple.   Lymphadenopathy:      Cervical: No " cervical adenopathy.   Skin:     General: Skin is warm and dry.   Neurological:      Mental Status: He is alert and oriented to person, place, and time.   Psychiatric:         Mood and Affect: Mood normal.         Behavior: Behavior normal.                    Medical Decision Making:      Comorbidities that affect care:    Diabetes, Thyroid Disease    External Notes reviewed:    Previous Clinic Note: Patient last seen by physical therapy on July 24 for chronic right shoulder pain      The following orders were placed and all results were independently analyzed by me:  No orders of the defined types were placed in this encounter.      Medications Given in the Emergency Department:  Medications   doxycycline (MONODOX) capsule 100 mg (has no administration in time range)   ibuprofen (ADVIL,MOTRIN) tablet 600 mg (has no administration in time range)        ED Course:         Labs:    Lab Results (last 24 hours)       ** No results found for the last 24 hours. **             Imaging:    No Radiology Exams Resulted Within Past 24 Hours      Differential Diagnosis and Discussion:    Facial Pain/Swelling: Differential diagnosis includes but is not limited to temporal arteritis, intracranial tumors, neuralgias, dental disease, ocular disease, TMJ syndrome, salivary gland disorders, sinusitis, cluster headaches, migraines, and psychogenic.    PROCEDURES:        No orders to display       Procedures    MDM  Number of Diagnoses or Management Options  Acute otalgia, left  Pain, dental  Sinus pain  Diagnosis management comments: I have explained the patient´s condition, diagnoses and treatment plan based on the information available to me at this time. I have answered questions and addressed any concerns. The patient has a good  understanding of the patient´s diagnosis, condition, and treatment plan as can be expected at this point. The vital signs have been stable. The patient´s condition is stable and appropriate for discharge  from the emergency department.      The patient will pursue further outpatient evaluation with the primary care physician or other designated or consulting physician as outlined in the discharge instructions. They are agreeable to this plan of care and follow-up instructions have been explained in detail. The patient has received these instructions in written format and have expressed an understanding of the discharge instructions. The patient is aware that any significant change in condition or worsening of symptoms should prompt an immediate return to this or the closest emergency department or call to 911.       Amount and/or Complexity of Data Reviewed  Tests in the medicine section of CPT®: ordered and reviewed    Risk of Complications, Morbidity, and/or Mortality  Presenting problems: low  Diagnostic procedures: low  Management options: low    Patient Progress  Patient progress: stable               Patient Care Considerations:    Patient has no significant physical exam findings for cellulitis or any signs of abscess that facial CT would be indicated for.  No mastoiditis.      Consultants/Shared Management Plan:    SHARED VISIT: I have discussed the case with my supervising physician, Dr. Goodrich who states okay to treat with antibiotics. The substantive portion of the medical decision was made by the attesting physician who made or approve the management plan and will take responsibility for the patient.  Clinical findings were discussed and ultimate disposition was made in consult with supervising physician.    Social Determinants of Health:    Patient is independent, reliable, and has access to care.       Disposition and Care Coordination:    Discharged: The patient is suitable and stable for discharge with no need for consideration of admission.    I have explained the patient´s condition, diagnoses and treatment plan based on the information available to me at this time. I have answered questions and  addressed any concerns. The patient has a good  understanding of the patient´s diagnosis, condition, and treatment plan as can be expected at this point. The vital signs have been stable. The patient´s condition is stable and appropriate for discharge from the emergency department.      The patient will pursue further outpatient evaluation with the primary care physician or other designated or consulting physician as outlined in the discharge instructions. They are agreeable to this plan of care and follow-up instructions have been explained in detail. The patient has received these instructions in written format and has expressed an understanding of the discharge instructions. The patient is aware that any significant change in condition or worsening of symptoms should prompt an immediate return to this or the closest emergency department or call to 911.  I have explained discharge medications and the need for follow up with the patient/caretakers. This was also printed in the discharge instructions. Patient was discharged with the following medications and follow up:      Medication List        New Prescriptions      doxycycline 100 MG capsule  Commonly known as: MONODOX  Take 1 capsule by mouth 2 (Two) Times a Day.     ibuprofen 600 MG tablet  Commonly known as: ADVIL,MOTRIN  Take 1 tablet by mouth Every 6 (Six) Hours As Needed for Moderate Pain, Mild Pain or Headache.               Where to Get Your Medications        These medications were sent to [address] - [address] [place] [address] - [phone]  - [phone]   [address] [facility] [place] [address]      Phone: [phone]   doxycycline 100 MG capsule  ibuprofen 600 MG tablet      No follow-up provider specified.     Final diagnoses:   Acute otalgia, left   Sinus pain   Pain, dental        ED Disposition       ED Disposition   Discharge    Condition   Stable    Comment   --               This medical  record created using voice recognition software.             Maria Luisa Cameron, MALCOM  08/01/25 9543

## 2025-08-05 ENCOUNTER — ANESTHESIA EVENT (OUTPATIENT)
Dept: PERIOP | Facility: HOSPITAL | Age: 45
End: 2025-08-05
Payer: COMMERCIAL

## 2025-08-06 ENCOUNTER — ANESTHESIA EVENT CONVERTED (OUTPATIENT)
Dept: ANESTHESIOLOGY | Facility: HOSPITAL | Age: 45
End: 2025-08-06
Payer: COMMERCIAL

## 2025-08-06 ENCOUNTER — HOSPITAL ENCOUNTER (OUTPATIENT)
Facility: HOSPITAL | Age: 45
Setting detail: HOSPITAL OUTPATIENT SURGERY
Discharge: HOME | End: 2025-08-06
Attending: ORTHOPAEDIC SURGERY | Admitting: ORTHOPAEDIC SURGERY
Payer: COMMERCIAL

## 2025-08-06 ENCOUNTER — ANESTHESIA (OUTPATIENT)
Dept: PERIOP | Facility: HOSPITAL | Age: 45
End: 2025-08-06
Payer: COMMERCIAL

## 2025-08-06 ENCOUNTER — TREATMENT (OUTPATIENT)
Dept: PHYSICAL THERAPY | Facility: CLINIC | Age: 45
End: 2025-08-06
Payer: COMMERCIAL

## 2025-08-06 VITALS
HEIGHT: 73 IN | SYSTOLIC BLOOD PRESSURE: 117 MMHG | HEART RATE: 62 BPM | DIASTOLIC BLOOD PRESSURE: 85 MMHG | BODY MASS INDEX: 30.71 KG/M2 | TEMPERATURE: 97.4 F | RESPIRATION RATE: 14 BRPM | OXYGEN SATURATION: 96 % | WEIGHT: 231.7 LBS

## 2025-08-06 DIAGNOSIS — M62.81 MUSCLE WEAKNESS OF RIGHT ARM: ICD-10-CM

## 2025-08-06 DIAGNOSIS — M75.01 ADHESIVE CAPSULITIS OF RIGHT SHOULDER: Primary | ICD-10-CM

## 2025-08-06 DIAGNOSIS — M25.611 DECREASED ROM OF RIGHT SHOULDER: ICD-10-CM

## 2025-08-06 DIAGNOSIS — G89.29 CHRONIC RIGHT SHOULDER PAIN: Primary | ICD-10-CM

## 2025-08-06 DIAGNOSIS — M25.511 CHRONIC RIGHT SHOULDER PAIN: Primary | ICD-10-CM

## 2025-08-06 DIAGNOSIS — Z98.890 S/P MANIPULATION OF AC JOINT: ICD-10-CM

## 2025-08-06 LAB
ANION GAP SERPL CALCULATED.3IONS-SCNC: 9.4 MMOL/L (ref 5–15)
BUN SERPL-MCNC: 10.2 MG/DL (ref 6–20)
BUN/CREAT SERPL: 11.6 (ref 7–25)
CALCIUM SPEC-SCNC: 9.5 MG/DL (ref 8.6–10.5)
CHLORIDE SERPL-SCNC: 104 MMOL/L (ref 98–107)
CO2 SERPL-SCNC: 24.6 MMOL/L (ref 22–29)
CREAT SERPL-MCNC: 0.88 MG/DL (ref 0.76–1.27)
EGFRCR SERPLBLD CKD-EPI 2021: 107.4 ML/MIN/1.73
GLUCOSE BLDC GLUCOMTR-MCNC: 133 MG/DL (ref 70–99)
GLUCOSE SERPL-MCNC: 156 MG/DL (ref 65–99)
POTASSIUM SERPL-SCNC: 4 MMOL/L (ref 3.5–5.2)
SODIUM SERPL-SCNC: 138 MMOL/L (ref 136–145)

## 2025-08-06 PROCEDURE — 25810000003 LACTATED RINGERS PER 1000 ML: Performed by: ANESTHESIOLOGY

## 2025-08-06 PROCEDURE — 25010000002 ROPIVACAINE PER 1 MG: Performed by: ANESTHESIOLOGY

## 2025-08-06 PROCEDURE — 82948 REAGENT STRIP/BLOOD GLUCOSE: CPT

## 2025-08-06 PROCEDURE — 25010000002 LIDOCAINE PF 2% 2 % SOLUTION

## 2025-08-06 PROCEDURE — 25010000002 METHYLPREDNISOLONE PER 40 MG: Performed by: ORTHOPAEDIC SURGERY

## 2025-08-06 PROCEDURE — 25010000002 PROPOFOL 10 MG/ML EMULSION

## 2025-08-06 PROCEDURE — 80048 BASIC METABOLIC PNL TOTAL CA: CPT | Performed by: ANESTHESIOLOGY

## 2025-08-06 PROCEDURE — 25010000002 MIDAZOLAM PER 1MG: Performed by: ANESTHESIOLOGY

## 2025-08-06 PROCEDURE — 23700 MNPJ ANES SHO JT FIXJ APRATS: CPT | Performed by: ORTHOPAEDIC SURGERY

## 2025-08-06 RX ORDER — HYDROCODONE BITARTRATE AND ACETAMINOPHEN 7.5; 325 MG/1; MG/1
1 TABLET ORAL ONCE AS NEEDED
Refills: 0 | Status: DISCONTINUED | OUTPATIENT
Start: 2025-08-06 | End: 2025-08-06 | Stop reason: HOSPADM

## 2025-08-06 RX ORDER — SODIUM CHLORIDE, SODIUM LACTATE, POTASSIUM CHLORIDE, CALCIUM CHLORIDE 600; 310; 30; 20 MG/100ML; MG/100ML; MG/100ML; MG/100ML
9 INJECTION, SOLUTION INTRAVENOUS CONTINUOUS PRN
Status: DISCONTINUED | OUTPATIENT
Start: 2025-08-06 | End: 2025-08-06 | Stop reason: HOSPADM

## 2025-08-06 RX ORDER — LIDOCAINE HYDROCHLORIDE 20 MG/ML
INJECTION, SOLUTION EPIDURAL; INFILTRATION; INTRACAUDAL; PERINEURAL AS NEEDED
Status: DISCONTINUED | OUTPATIENT
Start: 2025-08-06 | End: 2025-08-06 | Stop reason: SURG

## 2025-08-06 RX ORDER — ACETAMINOPHEN 500 MG
1000 TABLET ORAL ONCE
Status: COMPLETED | OUTPATIENT
Start: 2025-08-06 | End: 2025-08-06

## 2025-08-06 RX ORDER — METHYLPREDNISOLONE ACETATE 40 MG/ML
INJECTION, SUSPENSION INTRA-ARTICULAR; INTRALESIONAL; INTRAMUSCULAR; SOFT TISSUE AS NEEDED
Status: DISCONTINUED | OUTPATIENT
Start: 2025-08-06 | End: 2025-08-06 | Stop reason: HOSPADM

## 2025-08-06 RX ORDER — HYDROCODONE BITARTRATE AND ACETAMINOPHEN 7.5; 325 MG/1; MG/1
1 TABLET ORAL EVERY 4 HOURS PRN
Qty: 21 TABLET | Refills: 0 | Status: SHIPPED | OUTPATIENT
Start: 2025-08-06

## 2025-08-06 RX ORDER — ROPIVACAINE HYDROCHLORIDE 5 MG/ML
INJECTION, SOLUTION EPIDURAL; INFILTRATION; PERINEURAL
Status: COMPLETED | OUTPATIENT
Start: 2025-08-06 | End: 2025-08-06

## 2025-08-06 RX ORDER — PROPOFOL 10 MG/ML
VIAL (ML) INTRAVENOUS AS NEEDED
Status: DISCONTINUED | OUTPATIENT
Start: 2025-08-06 | End: 2025-08-06 | Stop reason: SURG

## 2025-08-06 RX ORDER — ONDANSETRON 2 MG/ML
4 INJECTION INTRAMUSCULAR; INTRAVENOUS ONCE AS NEEDED
Status: DISCONTINUED | OUTPATIENT
Start: 2025-08-06 | End: 2025-08-06 | Stop reason: HOSPADM

## 2025-08-06 RX ORDER — PROMETHAZINE HYDROCHLORIDE 12.5 MG/1
25 TABLET ORAL ONCE AS NEEDED
Status: DISCONTINUED | OUTPATIENT
Start: 2025-08-06 | End: 2025-08-06 | Stop reason: HOSPADM

## 2025-08-06 RX ORDER — PROMETHAZINE HYDROCHLORIDE 25 MG/1
25 SUPPOSITORY RECTAL ONCE AS NEEDED
Status: DISCONTINUED | OUTPATIENT
Start: 2025-08-06 | End: 2025-08-06 | Stop reason: HOSPADM

## 2025-08-06 RX ORDER — MIDAZOLAM HYDROCHLORIDE 2 MG/2ML
INJECTION, SOLUTION INTRAMUSCULAR; INTRAVENOUS AS NEEDED
Status: DISCONTINUED | OUTPATIENT
Start: 2025-08-06 | End: 2025-08-06 | Stop reason: SURG

## 2025-08-06 RX ORDER — OXYCODONE HYDROCHLORIDE 5 MG/1
5 TABLET ORAL
Refills: 0 | Status: DISCONTINUED | OUTPATIENT
Start: 2025-08-06 | End: 2025-08-06 | Stop reason: HOSPADM

## 2025-08-06 RX ADMIN — ACETAMINOPHEN 1000 MG: 500 TABLET ORAL at 07:01

## 2025-08-06 RX ADMIN — MIDAZOLAM HYDROCHLORIDE 3 MG: 1 INJECTION, SOLUTION INTRAMUSCULAR; INTRAVENOUS at 07:14

## 2025-08-06 RX ADMIN — SODIUM CHLORIDE, POTASSIUM CHLORIDE, SODIUM LACTATE AND CALCIUM CHLORIDE 9 ML/HR: 600; 310; 30; 20 INJECTION, SOLUTION INTRAVENOUS at 07:01

## 2025-08-06 RX ADMIN — ROPIVACAINE HYDROCHLORIDE 25 ML: 5 INJECTION EPIDURAL; INFILTRATION; PERINEURAL at 07:12

## 2025-08-06 RX ADMIN — PROPOFOL 200 MCG/KG/MIN: 10 INJECTION, EMULSION INTRAVENOUS at 07:47

## 2025-08-06 RX ADMIN — LIDOCAINE HYDROCHLORIDE 40 MG: 20 INJECTION, SOLUTION EPIDURAL; INFILTRATION; INTRACAUDAL; PERINEURAL at 07:46

## 2025-08-06 RX ADMIN — PROPOFOL 150 MG: 10 INJECTION, EMULSION INTRAVENOUS at 07:46

## 2025-08-07 ENCOUNTER — TREATMENT (OUTPATIENT)
Dept: PHYSICAL THERAPY | Facility: CLINIC | Age: 45
End: 2025-08-07
Payer: COMMERCIAL

## 2025-08-07 DIAGNOSIS — M62.81 MUSCLE WEAKNESS OF RIGHT ARM: ICD-10-CM

## 2025-08-07 DIAGNOSIS — M25.611 DECREASED ROM OF RIGHT SHOULDER: ICD-10-CM

## 2025-08-07 DIAGNOSIS — M25.511 CHRONIC RIGHT SHOULDER PAIN: Primary | ICD-10-CM

## 2025-08-07 DIAGNOSIS — G89.29 CHRONIC RIGHT SHOULDER PAIN: Primary | ICD-10-CM

## 2025-08-07 DIAGNOSIS — Z98.890 S/P MANIPULATION OF AC JOINT: ICD-10-CM

## 2025-08-08 ENCOUNTER — TREATMENT (OUTPATIENT)
Dept: PHYSICAL THERAPY | Facility: CLINIC | Age: 45
End: 2025-08-08
Payer: COMMERCIAL

## 2025-08-08 DIAGNOSIS — M62.81 MUSCLE WEAKNESS OF RIGHT ARM: ICD-10-CM

## 2025-08-08 DIAGNOSIS — M25.511 CHRONIC RIGHT SHOULDER PAIN: Primary | ICD-10-CM

## 2025-08-08 DIAGNOSIS — Z98.890 S/P MANIPULATION OF AC JOINT: ICD-10-CM

## 2025-08-08 DIAGNOSIS — G89.29 CHRONIC RIGHT SHOULDER PAIN: Primary | ICD-10-CM

## 2025-08-08 DIAGNOSIS — M25.611 DECREASED ROM OF RIGHT SHOULDER: ICD-10-CM

## 2025-08-11 ENCOUNTER — TREATMENT (OUTPATIENT)
Dept: PHYSICAL THERAPY | Facility: CLINIC | Age: 45
End: 2025-08-11
Payer: COMMERCIAL

## 2025-08-11 DIAGNOSIS — M25.611 DECREASED ROM OF RIGHT SHOULDER: ICD-10-CM

## 2025-08-11 DIAGNOSIS — M25.511 CHRONIC RIGHT SHOULDER PAIN: Primary | ICD-10-CM

## 2025-08-11 DIAGNOSIS — M75.01 ADHESIVE CAPSULITIS OF RIGHT SHOULDER: Primary | ICD-10-CM

## 2025-08-11 DIAGNOSIS — Z98.890 S/P MANIPULATION OF AC JOINT: ICD-10-CM

## 2025-08-11 DIAGNOSIS — G89.29 CHRONIC RIGHT SHOULDER PAIN: Primary | ICD-10-CM

## 2025-08-11 DIAGNOSIS — M62.81 MUSCLE WEAKNESS OF RIGHT ARM: ICD-10-CM

## 2025-08-11 PROCEDURE — 97110 THERAPEUTIC EXERCISES: CPT | Performed by: PHYSICAL THERAPIST

## 2025-08-11 PROCEDURE — 97530 THERAPEUTIC ACTIVITIES: CPT | Performed by: PHYSICAL THERAPIST

## 2025-08-11 PROCEDURE — 97140 MANUAL THERAPY 1/> REGIONS: CPT | Performed by: PHYSICAL THERAPIST

## 2025-08-11 RX ORDER — OXYCODONE AND ACETAMINOPHEN 5; 325 MG/1; MG/1
1 TABLET ORAL EVERY 4 HOURS PRN
Qty: 30 TABLET | Refills: 0 | Status: SHIPPED | OUTPATIENT
Start: 2025-08-11

## 2025-08-13 ENCOUNTER — TREATMENT (OUTPATIENT)
Dept: PHYSICAL THERAPY | Facility: CLINIC | Age: 45
End: 2025-08-13
Payer: COMMERCIAL

## 2025-08-13 DIAGNOSIS — G89.29 CHRONIC RIGHT SHOULDER PAIN: Primary | ICD-10-CM

## 2025-08-13 DIAGNOSIS — Z98.890 S/P MANIPULATION OF AC JOINT: ICD-10-CM

## 2025-08-13 DIAGNOSIS — M25.511 CHRONIC RIGHT SHOULDER PAIN: Primary | ICD-10-CM

## 2025-08-13 DIAGNOSIS — M62.81 MUSCLE WEAKNESS OF RIGHT ARM: ICD-10-CM

## 2025-08-13 DIAGNOSIS — M25.611 DECREASED ROM OF RIGHT SHOULDER: ICD-10-CM

## 2025-08-14 ENCOUNTER — TREATMENT (OUTPATIENT)
Dept: PHYSICAL THERAPY | Facility: CLINIC | Age: 45
End: 2025-08-14
Payer: COMMERCIAL

## 2025-08-14 DIAGNOSIS — M62.81 MUSCLE WEAKNESS OF RIGHT ARM: ICD-10-CM

## 2025-08-14 DIAGNOSIS — Z98.890 S/P MANIPULATION OF AC JOINT: ICD-10-CM

## 2025-08-14 DIAGNOSIS — G89.29 CHRONIC RIGHT SHOULDER PAIN: Primary | ICD-10-CM

## 2025-08-14 DIAGNOSIS — M25.511 CHRONIC RIGHT SHOULDER PAIN: Primary | ICD-10-CM

## 2025-08-14 DIAGNOSIS — M25.611 DECREASED ROM OF RIGHT SHOULDER: ICD-10-CM

## 2025-08-15 ENCOUNTER — TREATMENT (OUTPATIENT)
Dept: PHYSICAL THERAPY | Facility: CLINIC | Age: 45
End: 2025-08-15
Payer: COMMERCIAL

## 2025-08-15 DIAGNOSIS — G89.29 CHRONIC RIGHT SHOULDER PAIN: Primary | ICD-10-CM

## 2025-08-15 DIAGNOSIS — M25.611 DECREASED ROM OF RIGHT SHOULDER: ICD-10-CM

## 2025-08-15 DIAGNOSIS — M25.511 CHRONIC RIGHT SHOULDER PAIN: Primary | ICD-10-CM

## 2025-08-15 DIAGNOSIS — M62.81 MUSCLE WEAKNESS OF RIGHT ARM: ICD-10-CM

## 2025-08-18 ENCOUNTER — TREATMENT (OUTPATIENT)
Dept: PHYSICAL THERAPY | Facility: CLINIC | Age: 45
End: 2025-08-18
Payer: COMMERCIAL

## 2025-08-18 DIAGNOSIS — G89.29 CHRONIC RIGHT SHOULDER PAIN: Primary | ICD-10-CM

## 2025-08-18 DIAGNOSIS — M25.611 DECREASED ROM OF RIGHT SHOULDER: ICD-10-CM

## 2025-08-18 DIAGNOSIS — M62.81 MUSCLE WEAKNESS OF RIGHT ARM: ICD-10-CM

## 2025-08-18 DIAGNOSIS — M25.511 CHRONIC RIGHT SHOULDER PAIN: Primary | ICD-10-CM

## 2025-08-18 DIAGNOSIS — Z98.890 S/P MANIPULATION OF AC JOINT: ICD-10-CM

## 2025-08-18 PROCEDURE — 97530 THERAPEUTIC ACTIVITIES: CPT

## 2025-08-18 PROCEDURE — 97140 MANUAL THERAPY 1/> REGIONS: CPT

## 2025-08-18 PROCEDURE — 97110 THERAPEUTIC EXERCISES: CPT

## 2025-08-19 ENCOUNTER — TREATMENT (OUTPATIENT)
Dept: PHYSICAL THERAPY | Facility: CLINIC | Age: 45
End: 2025-08-19
Payer: COMMERCIAL

## 2025-08-19 DIAGNOSIS — M25.611 DECREASED ROM OF RIGHT SHOULDER: ICD-10-CM

## 2025-08-19 DIAGNOSIS — M62.81 MUSCLE WEAKNESS OF RIGHT ARM: ICD-10-CM

## 2025-08-19 DIAGNOSIS — M25.511 CHRONIC RIGHT SHOULDER PAIN: Primary | ICD-10-CM

## 2025-08-19 DIAGNOSIS — G89.29 CHRONIC RIGHT SHOULDER PAIN: Primary | ICD-10-CM

## 2025-08-19 DIAGNOSIS — Z98.890 S/P MANIPULATION OF AC JOINT: ICD-10-CM

## 2025-08-19 PROCEDURE — 97530 THERAPEUTIC ACTIVITIES: CPT

## 2025-08-19 PROCEDURE — 97110 THERAPEUTIC EXERCISES: CPT

## 2025-08-20 ENCOUNTER — TREATMENT (OUTPATIENT)
Dept: PHYSICAL THERAPY | Facility: CLINIC | Age: 45
End: 2025-08-20
Payer: COMMERCIAL

## 2025-08-20 DIAGNOSIS — M62.81 MUSCLE WEAKNESS OF RIGHT ARM: ICD-10-CM

## 2025-08-20 DIAGNOSIS — Z98.890 S/P MANIPULATION OF AC JOINT: ICD-10-CM

## 2025-08-20 DIAGNOSIS — M25.611 DECREASED ROM OF RIGHT SHOULDER: ICD-10-CM

## 2025-08-20 DIAGNOSIS — M25.511 CHRONIC RIGHT SHOULDER PAIN: Primary | ICD-10-CM

## 2025-08-20 DIAGNOSIS — G89.29 CHRONIC RIGHT SHOULDER PAIN: Primary | ICD-10-CM

## 2025-08-20 PROCEDURE — 97164 PT RE-EVAL EST PLAN CARE: CPT

## 2025-08-20 PROCEDURE — 97110 THERAPEUTIC EXERCISES: CPT

## 2025-08-20 PROCEDURE — 97530 THERAPEUTIC ACTIVITIES: CPT

## 2025-08-21 ENCOUNTER — OFFICE VISIT (OUTPATIENT)
Dept: ORTHOPEDIC SURGERY | Facility: CLINIC | Age: 45
End: 2025-08-21
Payer: COMMERCIAL

## 2025-08-21 ENCOUNTER — TREATMENT (OUTPATIENT)
Dept: PHYSICAL THERAPY | Facility: CLINIC | Age: 45
End: 2025-08-21
Payer: COMMERCIAL

## 2025-08-21 VITALS
OXYGEN SATURATION: 98 % | HEART RATE: 78 BPM | DIASTOLIC BLOOD PRESSURE: 83 MMHG | BODY MASS INDEX: 30.68 KG/M2 | SYSTOLIC BLOOD PRESSURE: 113 MMHG | WEIGHT: 231.48 LBS | HEIGHT: 73 IN

## 2025-08-21 DIAGNOSIS — G89.29 CHRONIC RIGHT SHOULDER PAIN: Primary | ICD-10-CM

## 2025-08-21 DIAGNOSIS — M75.01 ADHESIVE CAPSULITIS OF RIGHT SHOULDER: ICD-10-CM

## 2025-08-21 DIAGNOSIS — Z98.890 S/P MANIPULATION OF AC JOINT: ICD-10-CM

## 2025-08-21 DIAGNOSIS — M62.81 MUSCLE WEAKNESS OF RIGHT ARM: ICD-10-CM

## 2025-08-21 DIAGNOSIS — M25.611 DECREASED ROM OF RIGHT SHOULDER: ICD-10-CM

## 2025-08-21 DIAGNOSIS — M75.41 IMPINGEMENT SYNDROME OF RIGHT SHOULDER: ICD-10-CM

## 2025-08-21 DIAGNOSIS — M25.511 CHRONIC RIGHT SHOULDER PAIN: Primary | ICD-10-CM

## 2025-08-21 DIAGNOSIS — Z98.890 S/P MANIPULATION OF AC JOINT: Primary | ICD-10-CM

## 2025-08-21 DIAGNOSIS — M25.511 RIGHT SHOULDER PAIN, UNSPECIFIED CHRONICITY: ICD-10-CM

## 2025-08-21 PROCEDURE — 97110 THERAPEUTIC EXERCISES: CPT

## 2025-08-22 ENCOUNTER — TREATMENT (OUTPATIENT)
Dept: PHYSICAL THERAPY | Facility: CLINIC | Age: 45
End: 2025-08-22
Payer: COMMERCIAL

## 2025-08-22 DIAGNOSIS — M62.81 MUSCLE WEAKNESS OF RIGHT ARM: ICD-10-CM

## 2025-08-22 DIAGNOSIS — M25.611 DECREASED ROM OF RIGHT SHOULDER: ICD-10-CM

## 2025-08-22 DIAGNOSIS — G89.29 CHRONIC RIGHT SHOULDER PAIN: Primary | ICD-10-CM

## 2025-08-22 DIAGNOSIS — M25.511 CHRONIC RIGHT SHOULDER PAIN: Primary | ICD-10-CM

## 2025-08-22 DIAGNOSIS — Z98.890 S/P MANIPULATION OF AC JOINT: ICD-10-CM

## 2025-08-22 PROCEDURE — 97110 THERAPEUTIC EXERCISES: CPT

## 2025-08-22 PROCEDURE — 97530 THERAPEUTIC ACTIVITIES: CPT

## 2025-08-29 ENCOUNTER — TREATMENT (OUTPATIENT)
Dept: PHYSICAL THERAPY | Facility: CLINIC | Age: 45
End: 2025-08-29
Payer: COMMERCIAL

## 2025-08-29 DIAGNOSIS — Z98.890 S/P MANIPULATION OF AC JOINT: ICD-10-CM

## 2025-08-29 DIAGNOSIS — M25.611 DECREASED ROM OF RIGHT SHOULDER: ICD-10-CM

## 2025-08-29 DIAGNOSIS — M25.511 CHRONIC RIGHT SHOULDER PAIN: Primary | ICD-10-CM

## 2025-08-29 DIAGNOSIS — G89.29 CHRONIC RIGHT SHOULDER PAIN: Primary | ICD-10-CM

## 2025-08-29 DIAGNOSIS — M62.81 MUSCLE WEAKNESS OF RIGHT ARM: ICD-10-CM

## 2025-08-29 PROCEDURE — 97530 THERAPEUTIC ACTIVITIES: CPT

## 2025-08-29 PROCEDURE — 97110 THERAPEUTIC EXERCISES: CPT

## (undated) DEVICE — COMFORT ARM SLING: Brand: DEROYAL